# Patient Record
Sex: FEMALE | Race: WHITE | Employment: OTHER | URBAN - METROPOLITAN AREA
[De-identification: names, ages, dates, MRNs, and addresses within clinical notes are randomized per-mention and may not be internally consistent; named-entity substitution may affect disease eponyms.]

---

## 2018-01-21 ENCOUNTER — HOSPITAL ENCOUNTER (INPATIENT)
Facility: CLINIC | Age: 73
LOS: 5 days | Discharge: NURSING, REHAB OR LONG-TERM CARE | DRG: 190 | End: 2018-01-26
Attending: EMERGENCY MEDICINE | Admitting: INTERNAL MEDICINE
Payer: MEDICARE

## 2018-01-21 ENCOUNTER — APPOINTMENT (OUTPATIENT)
Dept: GENERAL RADIOLOGY | Facility: CLINIC | Age: 73
DRG: 190 | End: 2018-01-21
Attending: EMERGENCY MEDICINE
Payer: MEDICARE

## 2018-01-21 DIAGNOSIS — J44.1 COPD EXACERBATION (H): ICD-10-CM

## 2018-01-21 LAB
ANION GAP SERPL CALCULATED.3IONS-SCNC: 9 MMOL/L (ref 3–14)
BASE DEFICIT BLDV-SCNC: 0.1 MMOL/L
BUN SERPL-MCNC: 20 MG/DL (ref 7–30)
CALCIUM SERPL-MCNC: 8.1 MG/DL (ref 8.5–10.1)
CHLORIDE SERPL-SCNC: 98 MMOL/L (ref 94–109)
CO2 SERPL-SCNC: 26 MMOL/L (ref 20–32)
CREAT SERPL-MCNC: 0.48 MG/DL (ref 0.52–1.04)
ERYTHROCYTE [DISTWIDTH] IN BLOOD BY AUTOMATED COUNT: 13.2 % (ref 10–15)
FLUAV+FLUBV AG SPEC QL: NEGATIVE
FLUAV+FLUBV AG SPEC QL: NEGATIVE
GFR SERPL CREATININE-BSD FRML MDRD: >90 ML/MIN/1.7M2
GLUCOSE SERPL-MCNC: 134 MG/DL (ref 70–99)
HCO3 BLDV-SCNC: 26 MMOL/L (ref 21–28)
HCT VFR BLD AUTO: 42.5 % (ref 35–47)
HGB BLD-MCNC: 14.2 G/DL (ref 11.7–15.7)
INTERPRETATION ECG - MUSE: NORMAL
MCH RBC QN AUTO: 30 PG (ref 26.5–33)
MCHC RBC AUTO-ENTMCNC: 33.4 G/DL (ref 31.5–36.5)
MCV RBC AUTO: 90 FL (ref 78–100)
O2/TOTAL GAS SETTING VFR VENT: 2 %
OXYHGB MFR BLDV: 76 %
PCO2 BLDV: 47 MM HG (ref 40–50)
PH BLDV: 7.35 PH (ref 7.32–7.43)
PLATELET # BLD AUTO: 278 10E9/L (ref 150–450)
PO2 BLDV: 43 MM HG (ref 25–47)
POTASSIUM SERPL-SCNC: 3.5 MMOL/L (ref 3.4–5.3)
RBC # BLD AUTO: 4.73 10E12/L (ref 3.8–5.2)
SODIUM SERPL-SCNC: 133 MMOL/L (ref 133–144)
SPECIMEN SOURCE: NORMAL
WBC # BLD AUTO: 6.3 10E9/L (ref 4–11)

## 2018-01-21 PROCEDURE — 25000128 H RX IP 250 OP 636: Performed by: EMERGENCY MEDICINE

## 2018-01-21 PROCEDURE — 40000275 ZZH STATISTIC RCP TIME EA 10 MIN

## 2018-01-21 PROCEDURE — 94640 AIRWAY INHALATION TREATMENT: CPT

## 2018-01-21 PROCEDURE — A9270 NON-COVERED ITEM OR SERVICE: HCPCS | Mod: GY | Performed by: INTERNAL MEDICINE

## 2018-01-21 PROCEDURE — 99222 1ST HOSP IP/OBS MODERATE 55: CPT | Mod: AI | Performed by: INTERNAL MEDICINE

## 2018-01-21 PROCEDURE — 25000125 ZZHC RX 250

## 2018-01-21 PROCEDURE — 84145 PROCALCITONIN (PCT): CPT | Performed by: EMERGENCY MEDICINE

## 2018-01-21 PROCEDURE — 82805 BLOOD GASES W/O2 SATURATION: CPT | Performed by: EMERGENCY MEDICINE

## 2018-01-21 PROCEDURE — 87804 INFLUENZA ASSAY W/OPTIC: CPT | Performed by: EMERGENCY MEDICINE

## 2018-01-21 PROCEDURE — 80048 BASIC METABOLIC PNL TOTAL CA: CPT | Performed by: EMERGENCY MEDICINE

## 2018-01-21 PROCEDURE — 96374 THER/PROPH/DIAG INJ IV PUSH: CPT

## 2018-01-21 PROCEDURE — 25000125 ZZHC RX 250: Performed by: EMERGENCY MEDICINE

## 2018-01-21 PROCEDURE — 85027 COMPLETE CBC AUTOMATED: CPT | Performed by: EMERGENCY MEDICINE

## 2018-01-21 PROCEDURE — 12000000 ZZH R&B MED SURG/OB

## 2018-01-21 PROCEDURE — 25000132 ZZH RX MED GY IP 250 OP 250 PS 637: Mod: GY | Performed by: INTERNAL MEDICINE

## 2018-01-21 PROCEDURE — 99285 EMERGENCY DEPT VISIT HI MDM: CPT | Mod: 25

## 2018-01-21 PROCEDURE — 99207 ZZC CDG-MDM COMPONENT: MEETS LOW - DOWN CODED: CPT | Performed by: INTERNAL MEDICINE

## 2018-01-21 PROCEDURE — 93005 ELECTROCARDIOGRAM TRACING: CPT

## 2018-01-21 PROCEDURE — 71046 X-RAY EXAM CHEST 2 VIEWS: CPT

## 2018-01-21 RX ORDER — ONDANSETRON 2 MG/ML
4 INJECTION INTRAMUSCULAR; INTRAVENOUS EVERY 6 HOURS PRN
Status: DISCONTINUED | OUTPATIENT
Start: 2018-01-21 | End: 2018-01-26 | Stop reason: HOSPADM

## 2018-01-21 RX ORDER — AMOXICILLIN 250 MG
2 CAPSULE ORAL 2 TIMES DAILY PRN
Status: DISCONTINUED | OUTPATIENT
Start: 2018-01-21 | End: 2018-01-26 | Stop reason: HOSPADM

## 2018-01-21 RX ORDER — ALBUTEROL SULFATE 90 UG/1
2 AEROSOL, METERED RESPIRATORY (INHALATION) EVERY 6 HOURS PRN
COMMUNITY

## 2018-01-21 RX ORDER — IPRATROPIUM BROMIDE AND ALBUTEROL SULFATE 2.5; .5 MG/3ML; MG/3ML
6 SOLUTION RESPIRATORY (INHALATION) ONCE
Status: COMPLETED | OUTPATIENT
Start: 2018-01-21 | End: 2018-01-21

## 2018-01-21 RX ORDER — POLYETHYLENE GLYCOL 3350 17 G/17G
17 POWDER, FOR SOLUTION ORAL DAILY PRN
Status: DISCONTINUED | OUTPATIENT
Start: 2018-01-21 | End: 2018-01-26 | Stop reason: HOSPADM

## 2018-01-21 RX ORDER — BRIMONIDINE TARTRATE 1 MG/ML
1 SOLUTION/ DROPS OPHTHALMIC 2 TIMES DAILY
COMMUNITY

## 2018-01-21 RX ORDER — METHYLPREDNISOLONE SODIUM SUCCINATE 125 MG/2ML
80 INJECTION, POWDER, LYOPHILIZED, FOR SOLUTION INTRAMUSCULAR; INTRAVENOUS ONCE
Status: COMPLETED | OUTPATIENT
Start: 2018-01-21 | End: 2018-01-21

## 2018-01-21 RX ORDER — IPRATROPIUM BROMIDE AND ALBUTEROL SULFATE 2.5; .5 MG/3ML; MG/3ML
3 SOLUTION RESPIRATORY (INHALATION) ONCE
Status: COMPLETED | OUTPATIENT
Start: 2018-01-21 | End: 2018-01-21

## 2018-01-21 RX ORDER — OXYCODONE HYDROCHLORIDE 5 MG/1
5 TABLET ORAL EVERY 4 HOURS PRN
Status: DISCONTINUED | OUTPATIENT
Start: 2018-01-21 | End: 2018-01-26 | Stop reason: HOSPADM

## 2018-01-21 RX ORDER — ASPIRIN 81 MG/1
81 TABLET, CHEWABLE ORAL DAILY
Status: DISCONTINUED | OUTPATIENT
Start: 2018-01-22 | End: 2018-01-26 | Stop reason: HOSPADM

## 2018-01-21 RX ORDER — NITROGLYCERIN 0.4 MG/1
0.4 TABLET SUBLINGUAL EVERY 5 MIN PRN
Status: DISCONTINUED | OUTPATIENT
Start: 2018-01-21 | End: 2018-01-26 | Stop reason: HOSPADM

## 2018-01-21 RX ORDER — PROCHLORPERAZINE 25 MG
12.5 SUPPOSITORY, RECTAL RECTAL EVERY 12 HOURS PRN
Status: DISCONTINUED | OUTPATIENT
Start: 2018-01-21 | End: 2018-01-26 | Stop reason: HOSPADM

## 2018-01-21 RX ORDER — LISINOPRIL 20 MG/1
20 TABLET ORAL DAILY
Status: DISCONTINUED | OUTPATIENT
Start: 2018-01-22 | End: 2018-01-23

## 2018-01-21 RX ORDER — AMOXICILLIN 250 MG
1 CAPSULE ORAL 2 TIMES DAILY PRN
Status: DISCONTINUED | OUTPATIENT
Start: 2018-01-21 | End: 2018-01-26 | Stop reason: HOSPADM

## 2018-01-21 RX ORDER — ALBUTEROL SULFATE 0.83 MG/ML
2.5 SOLUTION RESPIRATORY (INHALATION)
Status: DISCONTINUED | OUTPATIENT
Start: 2018-01-21 | End: 2018-01-26 | Stop reason: HOSPADM

## 2018-01-21 RX ORDER — NALOXONE HYDROCHLORIDE 0.4 MG/ML
.1-.4 INJECTION, SOLUTION INTRAMUSCULAR; INTRAVENOUS; SUBCUTANEOUS
Status: DISCONTINUED | OUTPATIENT
Start: 2018-01-21 | End: 2018-01-26 | Stop reason: HOSPADM

## 2018-01-21 RX ORDER — IPRATROPIUM BROMIDE AND ALBUTEROL SULFATE 2.5; .5 MG/3ML; MG/3ML
3 SOLUTION RESPIRATORY (INHALATION)
Status: DISCONTINUED | OUTPATIENT
Start: 2018-01-22 | End: 2018-01-26 | Stop reason: HOSPADM

## 2018-01-21 RX ORDER — DOXYCYCLINE HYCLATE 100 MG
100 TABLET ORAL 2 TIMES DAILY
Status: DISCONTINUED | OUTPATIENT
Start: 2018-01-21 | End: 2018-01-26 | Stop reason: HOSPADM

## 2018-01-21 RX ORDER — ONDANSETRON 4 MG/1
4 TABLET, ORALLY DISINTEGRATING ORAL EVERY 6 HOURS PRN
Status: DISCONTINUED | OUTPATIENT
Start: 2018-01-21 | End: 2018-01-26 | Stop reason: HOSPADM

## 2018-01-21 RX ORDER — LIDOCAINE 40 MG/G
CREAM TOPICAL
Status: DISCONTINUED | OUTPATIENT
Start: 2018-01-21 | End: 2018-01-26 | Stop reason: HOSPADM

## 2018-01-21 RX ORDER — DORZOLAMIDE HYDROCHLORIDE AND TIMOLOL MALEATE 20; 5 MG/ML; MG/ML
1 SOLUTION/ DROPS OPHTHALMIC 2 TIMES DAILY
Status: DISCONTINUED | OUTPATIENT
Start: 2018-01-22 | End: 2018-01-26 | Stop reason: HOSPADM

## 2018-01-21 RX ORDER — ACETAMINOPHEN 325 MG/1
650 TABLET ORAL EVERY 4 HOURS PRN
Status: DISCONTINUED | OUTPATIENT
Start: 2018-01-21 | End: 2018-01-26 | Stop reason: HOSPADM

## 2018-01-21 RX ORDER — DORZOLAMIDE HYDROCHLORIDE AND TIMOLOL MALEATE 20; 5 MG/ML; MG/ML
1 SOLUTION/ DROPS OPHTHALMIC 2 TIMES DAILY
COMMUNITY

## 2018-01-21 RX ORDER — PROCHLORPERAZINE MALEATE 5 MG
5 TABLET ORAL EVERY 6 HOURS PRN
Status: DISCONTINUED | OUTPATIENT
Start: 2018-01-21 | End: 2018-01-26 | Stop reason: HOSPADM

## 2018-01-21 RX ORDER — DOXYCYCLINE 100 MG/1
100 CAPSULE ORAL 2 TIMES DAILY
Status: ON HOLD | COMMUNITY
Start: 2018-01-20 | End: 2018-01-26

## 2018-01-21 RX ORDER — BRIMONIDINE TARTRATE 1 MG/ML
1 SOLUTION/ DROPS OPHTHALMIC 2 TIMES DAILY
Status: DISCONTINUED | OUTPATIENT
Start: 2018-01-22 | End: 2018-01-26 | Stop reason: HOSPADM

## 2018-01-21 RX ORDER — ATORVASTATIN CALCIUM 10 MG/1
10 TABLET, FILM COATED ORAL DAILY
Status: DISCONTINUED | OUTPATIENT
Start: 2018-01-22 | End: 2018-01-26 | Stop reason: HOSPADM

## 2018-01-21 RX ORDER — BISACODYL 10 MG
10 SUPPOSITORY, RECTAL RECTAL DAILY PRN
Status: DISCONTINUED | OUTPATIENT
Start: 2018-01-21 | End: 2018-01-26 | Stop reason: HOSPADM

## 2018-01-21 RX ORDER — IPRATROPIUM BROMIDE AND ALBUTEROL SULFATE 2.5; .5 MG/3ML; MG/3ML
SOLUTION RESPIRATORY (INHALATION)
Status: COMPLETED
Start: 2018-01-21 | End: 2018-01-21

## 2018-01-21 RX ADMIN — IPRATROPIUM BROMIDE AND ALBUTEROL SULFATE 3 ML: .5; 3 SOLUTION RESPIRATORY (INHALATION) at 22:21

## 2018-01-21 RX ADMIN — IPRATROPIUM BROMIDE AND ALBUTEROL SULFATE 3 ML: 2.5; .5 SOLUTION RESPIRATORY (INHALATION) at 21:15

## 2018-01-21 RX ADMIN — IPRATROPIUM BROMIDE AND ALBUTEROL SULFATE 3 ML: .5; 3 SOLUTION RESPIRATORY (INHALATION) at 21:15

## 2018-01-21 RX ADMIN — DOXYCYCLINE HYCLATE 100 MG: 100 TABLET, FILM COATED ORAL at 23:55

## 2018-01-21 RX ADMIN — METHYLPREDNISOLONE SODIUM SUCCINATE 81.25 MG: 125 INJECTION, POWDER, FOR SOLUTION INTRAMUSCULAR; INTRAVENOUS at 21:46

## 2018-01-21 RX ADMIN — IPRATROPIUM BROMIDE AND ALBUTEROL SULFATE 3 ML: .5; 3 SOLUTION RESPIRATORY (INHALATION) at 21:48

## 2018-01-21 NOTE — IP AVS SNAPSHOT
[place] [address] [facility]    [address]    [facility] [address]    Phone:  [phone]    Fax:  [phone]                                       After Visit Summary   1/21/2018    Berenice Krause    MRN: [id number]           After Visit Summary Signature Page     I have received my discharge instructions, and my questions have been answered. I have discussed any challenges I see with this plan with the nurse or doctor.    ..........................................................................................................................................  Patient/Patient Representative Signature      ..........................................................................................................................................  Patient Representative Print Name and Relationship to Patient    ..................................................               ................................................  Date                                            Time    ..........................................................................................................................................  Reviewed by Signature/Title    ...................................................              ..............................................  Date                                                            Time

## 2018-01-21 NOTE — IP AVS SNAPSHOT
MRN:[id number]                      After Visit Summary   1/21/2018    Berenice Krause    MRN: [id number]           Thank you!     Thank you for choosing [facility] for your care. Our goal is always to provide you with excellent care. Hearing back from our patients is one way we can continue to improve our services. Please take a few minutes to complete the written survey that you may receive in the mail after you visit. If you would like to speak to someone directly about your visit please contact Patient Relations at [phone]. Thank you!          Patient Information     Date Of Birth          1945        Designated Caregiver       Most Recent Value    Caregiver    Will someone help with your care after discharge? no      About your hospital stay     You were admitted on:  January 21, 2018 You last received care in the:  [place] [address] [facility]    You were discharged on:  January 26, 2018       Who to Call     For medical emergencies, please call 911.  For non-urgent questions about your medical care, please call your primary care provider or clinic, [phone]          Attending Provider     Provider Specialty    Tad Menon MD Emergency Medicine    Lum, Sally Viramontes MD Emergency Medicine    Zak Crespo,  Internal Medicine       Primary Care Provider Office Phone # Fax #    Laureen Brizuela -[phone]      After Care Instructions     General info for SNF       Length of Stay Estimate: Short Term Care: Estimated # of Days <30  Condition at Discharge: Improving  Level of care:skilled   Rehabilitation Potential: Good  Admission H&P remains valid and up-to-date: Yes  Recent Chemotherapy: N/A  Use Nursing Home Standing Orders: Yes            Mantoux instructions       Give two-step Mantoux (PPD) Per Facility Policy Yes            Oxygen - Nasal cannula       1-2 Lpm by nasal cannula to keep O2 sats 92% or greater. Wean as able    "               Follow-up Appointments     Follow Up and recommended labs and tests       F/U with [facility] NP/MD in 7 days for recheck post hospitalization and to recheck BMP and BP after resumption of home lisinopril dosing of 20 mg per day  On discharge from [facility] she plans to f/u with Dr. Blank of [facility] to establish care-at that visit she should be evaluated for osteoporoses if not already completed and to get set up for formal pulmonary function studies to evaluate baseline lung function.                  Additional Services     Physical Therapy Adult Consult       Evaluate and treat as clinically indicated.    Reason:  Deconditioning                  Further instructions from your care team       NUTRITION DISCHARGE INSTRUCTIONS  - recommend high protein oral supplement if appetite/intakes remain poor, or if further weight loss is observed. Recommend multivitamin supplement.     Pending Results     No orders found from 1/19/2018 to 1/22/2018.            Statement of Approval     Ordered          01/26/18 1041  I have reviewed and agree with all the recommendations and orders detailed in this document.  EFFECTIVE NOW     Approved and electronically signed by:  Radha [place] MD             Admission Information     Date & Time Provider Department Dept. Phone    1/21/2018 Zak Crespo, DO [place] [address] [facility] [phone]      Your Vitals Were     Blood Pressure Pulse Temperature Respirations Height Weight    135/80 92 97.5  F (36.4  C) (Oral) 18 1.575 m (5' 2\") 36 kg (79 lb 6.4 oz)    Pulse Oximetry BMI (Body Mass Index)                94% 14.52 kg/m2          MyChart Information     [employer] lets you send messages to your doctor, view your test results, renew your prescriptions, schedule appointments and more. To sign up, go to [email or web address] . Click on \"Log in\" on the left side of the screen, which will take you to the Welcome page. Then click on \"Sign up Now\" on the right side " of the page.     You will be asked to enter the access code listed below, as well as some personal information. Please follow the directions to create your username and password.     Your access code is: [email or web address]  Expires: 2018 10:38 AM     Your access code will  in 90 days. If you need help or a new code, please call your [facility] clinic or [phone].        Care EveryWhere ID     This is your Care EveryWhere ID. This could be used by other organizations to access your [facility] medical records  [id number]        Equal Access to Services     [facility]: Hadii kiet Bardales, wadanielle aburto, darwin booth, suraj monteiro . So [facility] [phone].    ATENCIÓN: Si habla español, tiene a aldrich disposición servicios gratuitos de asistencia lingüística. Llame al [phone].    We comply with applicable federal civil rights laws and [place] laws. We do not discriminate on the basis of race, color, national origin, age, disability, sex, sexual orientation, or gender identity.               Review of your medicines      START taking        Dose / Directions    guaiFENesin 20 mg/mL Soln solution   Commonly known as:  ROBITUSSIN        Dose:  10 mL   Take 10 mLs by mouth every 4 hours as needed for cough   Quantity:  1200 mL   Refills:  0       ipratropium - albuterol 0.5 mg/2.5 mg/3 mL 0.5-2.5 (3) MG/3ML neb solution   Commonly known as:  DUONEB        Dose:  3 mL   Take 1 vial (3 mLs) by nebulization 4 times daily Take for 2 more days scheduled and then can use prn   Quantity:  360 mL   Refills:  0       predniSONE 20 MG tablet   Commonly known as:  DELTASONE        Dose:  20 mg   Start taking on:  2018   Take 1 tablet (20 mg) by mouth daily 20 mg po qd x 3 days then 10 mg po qd x 3 days then d/c   Refills:  0         CONTINUE these medicines which have NOT CHANGED        Dose / Directions    albuterol 108 (90 BASE) MCG/ACT Inhaler   Commonly known as:   PROAIR HFA/PROVENTIL HFA/VENTOLIN HFA        Dose:  2 puff   Inhale 2 puffs into the lungs every 6 hours as needed for shortness of breath / dyspnea or wheezing   Refills:  0       ASPIRIN ADULT LOW STRENGTH PO        Dose:  81 mg   Take 81 mg by mouth daily   Refills:  0       bimatoprost 0.01 % Soln   Commonly known as:  LUMIGAN        Dose:  1 drop   Place 1 drop into both eyes At Bedtime   Refills:  0       brimonidine 0.1 % ophthalmic solution   Commonly known as:  ALPHAGAN P        Dose:  1 drop   Place 1 drop into both eyes 2 times daily   Refills:  0       COSOPT 2-0.5 % ophthalmic solution   Generic drug:  dorzolamide-timolol        Dose:  1 drop   Place 1 drop into both eyes 2 times daily   Refills:  0       doxycycline 100 MG capsule   Commonly known as:  VIBRAMYCIN        Dose:  100 mg   Take 1 capsule (100 mg) by mouth 2 times daily for 4 days   Quantity:  8 capsule   Refills:  0       fluticasone-salmeterol 250-50 MCG/DOSE diskus inhaler   Commonly known as:  ADVAIR        Dose:  1 puff   Inhale 1 puff into the lungs every 12 hours   Refills:  0       LIPITOR PO        Dose:  10 mg   Take 10 mg by mouth daily   Refills:  0       LISINOPRIL PO        Dose:  20 mg   Take 20 mg by mouth daily   Refills:  0            Where to get your medicines      Some of these will need a paper prescription and others can be bought over the counter. Ask your nurse if you have questions.     You don't need a prescription for these medications     doxycycline 100 MG capsule    guaiFENesin 20 mg/mL Soln solution    ipratropium - albuterol 0.5 mg/2.5 mg/3 mL 0.5-2.5 (3) MG/3ML neb solution    predniSONE 20 MG tablet                Protect others around you: Learn how to safely use, store and throw away your medicines at www.disposemymeds.org.        ANTIBIOTIC INSTRUCTION     You've Been Prescribed an Antibiotic - Now What?  Your healthcare team thinks that you or your loved one might have an infection. Some infections  can be treated with antibiotics, which are powerful, life-saving drugs. Like all medications, antibiotics have side effects and should only be used when necessary. There are some important things you should know about your antibiotic treatment.      Your healthcare team may run tests before you start taking an antibiotic.    Your team may take samples (e.g., from your blood, urine or other areas) to run tests to look for bacteria. These test can be important to determine if you need an antibiotic at all and, if you do, which antibiotic will work best.      Within a few days, your healthcare team might change or even stop your antibiotic.    Your team may start you on an antibiotic while they are working to find out what is making you sick.    Your team might change your antibiotic because test results show that a different antibiotic would be better to treat your infection.    In some cases, once your team has more information, they learn that you do not need an antibiotic at all. They may find out that you don't have an infection, or that the antibiotic you're taking won't work against your infection. For example, an infection caused by a virus can't be treated with antibiotics. Staying on an antibiotic when you don't need it is more likely to be harmful than helpful.      You may experience side effects from your antibiotic.    Like all medications, antibiotics have side effects. Some of these can be serious.    Let you healthcare team know if you have any known allergies when you are admitted to the hospital.    One significant side effect of nearly all antibiotics is the risk of severe and sometimes deadly diarrhea caused by Clostridium difficile (C. Difficile). This occurs when a person takes antibiotics because some good germs are destroyed. Antibiotic use allows C. diificile to take over, putting patients at high risk for this serious infection.    As a patient or caregiver, it is important to understand your  or your loved one's antibiotic treatment. It is especially important for caregivers to speak up when patients can't speak for themselves. Here are some important questions to ask your healthcare team.    What infection is this antibiotic treating and how do you know I have that infection?    What side effects might occur from this antibiotic?    How long will I need to take this antibiotic?    Is it safe to take this antibiotic with other medications or supplements (e.g., vitamins) that I am taking?     Are there any special directions I need to know about taking this antibiotic? For example, should I take it with food?    How will I be monitored to know whether my infection is responding to the antibiotic?    What tests may help to make sure the right antibiotic is prescribed for me?      Information provided by:  www.cdc.gov/getsmart  U.S. Department of Health and Human Services  Centers for disease Control and Prevention  National Center for Emerging and Zoonotic Infectious Diseases  Division of Healthcare Quality Promotion             Medication List: This is a list of all your medications and when to take them. Check marks below indicate your daily home schedule. Keep this list as a reference.      Medications           Morning Afternoon Evening Bedtime As Needed    albuterol 108 (90 BASE) MCG/ACT Inhaler   Commonly known as:  PROAIR HFA/PROVENTIL HFA/VENTOLIN HFA   Inhale 2 puffs into the lungs every 6 hours as needed for shortness of breath / dyspnea or wheezing                                ASPIRIN ADULT LOW STRENGTH PO   Take 81 mg by mouth daily   Last time this was given:  81 mg on 1/26/2018  9:32 AM                                bimatoprost 0.01 % Soln   Commonly known as:  LUMIGAN   Place 1 drop into both eyes At Bedtime   Last time this was given:  1 drop on 1/25/2018  9:27 PM                                brimonidine 0.1 % ophthalmic solution   Commonly known as:  ALPHAGAN P   Place 1 drop into  both eyes 2 times daily   Last time this was given:  1 drop on 1/26/2018  9:35 AM                                COSOPT 2-0.5 % ophthalmic solution   Place 1 drop into both eyes 2 times daily   Last time this was given:  1 drop on 1/26/2018  9:37 AM   Generic drug:  dorzolamide-timolol                                doxycycline 100 MG capsule   Commonly known as:  VIBRAMYCIN   Take 1 capsule (100 mg) by mouth 2 times daily for 4 days                                fluticasone-salmeterol 250-50 MCG/DOSE diskus inhaler   Commonly known as:  ADVAIR   Inhale 1 puff into the lungs every 12 hours                                guaiFENesin 20 mg/mL Soln solution   Commonly known as:  ROBITUSSIN   Take 10 mLs by mouth every 4 hours as needed for cough                                ipratropium - albuterol 0.5 mg/2.5 mg/3 mL 0.5-2.5 (3) MG/3ML neb solution   Commonly known as:  DUONEB   Take 1 vial (3 mLs) by nebulization 4 times daily Take for 2 more days scheduled and then can use prn   Last time this was given:  3 mLs on 1/26/2018 11:07 AM                                LIPITOR PO   Take 10 mg by mouth daily   Last time this was given:  10 mg on 1/26/2018  9:32 AM                                LISINOPRIL PO   Take 20 mg by mouth daily   Last time this was given:  10 mg on 1/26/2018  9:31 AM                                predniSONE 20 MG tablet   Commonly known as:  DELTASONE   Take 1 tablet (20 mg) by mouth daily 20 mg po qd x 3 days then 10 mg po qd x 3 days then d/c   Start taking on:  1/27/2018   Last time this was given:  40 mg on 1/26/2018  9:32 AM

## 2018-01-21 NOTE — LETTER
Key Recommendations:  First COPD exacerbation.  Pt given COPD action care plan.      Esme Alfredo    AVS/Discharge Summary is the source of truth; this is a helpful guide for improved communication of patient story

## 2018-01-21 NOTE — LETTER
Transition Communication Hand-off for Care Transitions to Next Level of Care Provider    Name: Berenice Krause  MRN #: [id number]  Primary Care Provider: Laureen Brizuela  Primary Care MD Name: Dr Brizuela  Primary Clinic: [facility] [address]  [facility] [address]  Primary Care Clinic Name: [facility]  Reason for Hospitalization:  COPD exacerbation (H) [J44.1]  Admit Date/Time: 1/21/2018  9:05 PM  Discharge Date: 1/26  Payor Source: Payor: BCBS / Plan: BCBS PLATINUM BLUE / Product Type: PPO /     Readmission Assessment Measure (PETRA) Risk Score/category: average           Reason for Communication Hand-off Referral: Admission diagnoses: COPD    Discharge Plan: [facility]       Concern for non-adherence with plan of care:   Y/N n  Discharge Needs Assessment:  Needs       Most Recent Value    Anticipated Changes Related to Illness none    Equipment Currently Used at Home none    Transportation Available car, family or friend will provide    # of Referrals Placed by [facility]    PAS Number [id number]          Already enrolled in Tele-monitoring program and name of program:  refused  Follow-up specialty is recommended: Yes    Follow-up plan:  No future appointments.    Any outstanding tests or procedures:    Procedures     Future Labs/Procedures    Oxygen - Nasal cannula     Comments:    1-2 Lpm by nasal cannula to keep O2 sats 92% or greater. Wean as able          Referrals     Future Labs/Procedures    Physical Therapy Adult Consult     Comments:    Evaluate and treat as clinically indicated.    Reason:  Deconditioning            Key Recommendations:  First COPD exacerbation.  Pt given COPD action care plan.  Pt discharge to TCU with oxygen needs. Could benefit from close following/follow up after discharge from TCU on COPD symptom management and possible new home oxygen needs.    Adelita Taylor    AVS/Discharge Summary is the source of truth; this is a helpful guide for improved  communication of patient story

## 2018-01-21 NOTE — IP AVS SNAPSHOT
` `     [place] [address] MEDICAL SURGICAL: [phone]                 INTERAGENCY TRANSFER FORM - NOTES (H&P, Discharge Summary, Consults, Procedures, Therapies)   2018                    Hospital Admission Date: 2018  BERENICE KRAUSE   : 1945  Sex: Female        Patient PCP Information     Provider PCP Type    Laureen Brizuela MD General         History & Physicals      H&P by Zak Crespo DO at 2018 11:08 PM     Author:  Zak Crespo DO Service:  Hospitalist Author Type:  Physician    Filed:  2018 11:08 PM Date of Service:  2018 11:08 PM Creation Time:  2018 11:03 PM    Status:  Signed :  Zak Crespo DO (Physician)         [facility]  Hospitalist Admission Note    Name: Berenice Krause      MRN: [id number]  YOB: 1945    Age: 72 year old  Date of admission: 2018  Primary care provider: Laureen Brizuela            Assessment and Plan:   Berenice Krause is a 72 year old female with a history of COPD who presents with COPD exacerbation    1. COPD exacerbation.  Prednisone 60 mg/day.  Duonebs QID.  Albuterol as needed.    2.  Acute hypoxic respiratory failure.  Supplemental oxygen as needed.    3.  Hyperlipidemia.  Lipitor.     4.  Hypertension.  Lisinopril.    Code status: Full code.  Admit to inpatient.  Prophylaxis: Lovenox.              Chief Complaint:   Shortness of breath.  Shortness of breath and cough.         History of Present Illness:   Berenice Krause a 72-year-old female who presents with shortness of breath.  She has been feeling short of breath for the past few weeks.  Has been significantly worse over the past 5 days.  It is becoming worse over the past 5 days has been progressively worsening during this time.  She has developed a nonproductive cough.  Feels weak with this.  Has never had symptoms this severe previously.  Has not been around anyone else sick that she knows of.  She has not had fevers or chills.   Has not had any chest pain.  Nothing at home was helping the symptoms.  Nebulizers in the emergency room have helped symptoms.  No other complaints.              Past Medical History:   COPD.  Hyperlipidemia.  Atrial septal defect repaired as a child.  Hypertension.       Past Surgical History:   History reviewed. No pertinent surgical history.          Social History:     Social History   Substance Use Topics     Smoking status: Former Smoker     Smokeless tobacco: Former User     Alcohol use No             Family History:   Mother with lung cancer.         Allergies:   No Known Allergies          Medications:     Prior to Admission medications    Medication Sig Last Dose Taking? Auth Provider   albuterol (PROAIR HFA/PROVENTIL HFA/VENTOLIN HFA) 108 (90 BASE) MCG/ACT Inhaler Inhale 2 puffs into the lungs every 6 hours as needed for shortness of breath / dyspnea or wheezing 1/21/2018 Yes Reported, Patient   ASPIRIN ADULT LOW STRENGTH PO Take 81 mg by mouth daily 1/20/2018 Yes Reported, Patient   brimonidine (ALPHAGAN P) 0.1 % ophthalmic solution Place 1 drop into both eyes 2 times daily 1/21/2018 at PM Yes Reported, Patient   Atorvastatin Calcium (LIPITOR PO) Take 10 mg by mouth daily 1/20/2018 at PM Yes Reported, Patient   bimatoprost (LUMIGAN) 0.01 % SOLN Place 1 drop into both eyes At Bedtime 1/20/2018 at PM Yes Reported, Patient   dorzolamide-timolol (COSOPT) 2-0.5 % ophthalmic solution Place 1 drop into both eyes 2 times daily 1/21/2018 at PM Yes Reported, Patient   doxycycline (VIBRAMYCIN) 100 MG capsule Take 100 mg by mouth 2 times daily 1/21/2018 at AM Yes Reported, Patient   LISINOPRIL PO Take 20 mg by mouth daily 1/21/2018 at AM Yes Reported, Patient   fluticasone-salmeterol (ADVAIR) 250-50 MCG/DOSE diskus inhaler Inhale 1 puff into the lungs every 12 hours Not Started Yes Reported, Patient             Review of Systems:   A Comprehensive greater than 10 system review of systems was carried out.  Pertinent  positives and negatives are noted above.  Otherwise negative for contributory information.           Physical Exam:   Blood pressure 135/81, pulse 92, temperature 99.3  F (37.4  C), temperature source Oral, resp. rate 28, weight 37.2 kg (82 lb), SpO2 96 %.  Wt Readings from Last 1 Encounters:   01/21/18 37.2 kg (82 lb)     Exam:  GENERAL: No apparent distress. Awake, alert, and fully oriented.  HEENT: Normocephalic, atraumatic. Extraocular movements intact.  CARDIOVASCULAR: Regular rate and rhythm without murmurs or rubs. No S3.  PULMONARY: Wheeze to auscultation bilaterally.  ABDOMINAL: Soft, non-tender, non-distended. Bowel sounds normoactive.   EXTREMITIES: No cyanosis or clubbing. No appreciable edema.  NEUROLOGICAL: CN 2-12 grossly intact, no focal neurological deficits.  DERMATOLOGICAL: No rash, ulcer, bruising, nor jaundice.          Data:       Laboratory:[KR1.1]    Recent Labs  Lab 01/21/18  2126   WBC 6.3   HGB 14.2   HCT 42.5   MCV 90          Recent Labs  Lab 01/21/18  2126      POTASSIUM 3.5   CHLORIDE 98   CO2 26   ANIONGAP 9   *   BUN 20   CR 0.48*   GFRESTIMATED >90   GFRESTBLACK >90   RICHARD 8.1*[KR1.2]     No results for input(s): CULT in the last 168 hours.    Imaging:  Recent Results (from the past 24 hour(s))   Chest XR,  PA & LAT    Narrative    CHEST TWO VIEWS  1/21/2018 9:40 PM     HISTORY: Dyspnea.    COMPARISON: 12/17/2011.      Impression    IMPRESSION: Hyperinflated lungs. Masslike density laterally in the  left upper lobe 2.2 x 1.3 cm diameter, unchanged. Normal heart size  and pulmonary vascularity. Sternal wires. No pleural effusion.  Interval development of compression fractures of two mid to lower  thoracic vertebral bodies. Age is indeterminate.     KEERTHI MITTAL MD[KR1.1]            Revision History        User Key Date/Time User Provider Type Action    > KR1.2 1/21/2018 11:08 PM Zak Crespo, DO Physician Sign     KR1.1 1/21/2018 11:03 PM Zak Crespo  "D, DO Physician                   Discharge Summaries     No notes of this type exist for this encounter.         Consult Notes      Consults by Shauna Salmeron RD, LD at 1/24/2018  7:55 AM     Author:  Sahuna Salmeron RD, LD Service:  Nutrition Author Type:  Registered Dietitian    Filed:  1/24/2018 10:17 AM Date of Service:  1/24/2018  7:55 AM Creation Time:  1/24/2018  7:29 AM    Status:  Signed :  Shauna Salmeron RD, LD (Registered Dietitian)     Consult Orders:    1. Nutrition Services Adult IP Consult [[id number]] ordered by Zak Crespo DO at 01/23/18 1434                CLINICAL NUTRITION SERVICES  -  ASSESSMENT NOTE    Malnutrition:[AK1.1] Severe[AK1.2]     REASON FOR ASSESSMENT  Berenice Krause is a 72 year old female seen by Registered Dietitian for Provider Order - Nutrition Education - \"malnutrition\"    NUTRITION HISTORY[AK1.1]  - Information obtained from Jan.[AK1.2]  - Admitted for COPD exacerbation[AK1.1], pt struggles with SOB at baseline.   - gave minimal information about usual intakes, other than that she tries to do smaller/frequent meals because she cannot eat much at one time.   - Breakfast is usually cereal, and she eats a muffin for a snack mid-morning. When asked about lunch/dinner, pt stated that she hasn't been \"doing a good job lately\" of eating adequate meals.   - [place] downs got back from a cruise, where she made it to all meals however reports she did not eat much. She was feeling unwell for the 5 days she was on vacation.   - describes at least a 2-3 week period prior to her vacation when she was not eating as well as normal, however suspect that pt eats small meals/snacks at baseline. <50-75% intakes for at least the past 3-4 weeks.   - NKFA, although pt does dislike many foods (especially protein foods). Does not eat yogurt, cottage cheese, much meat.   - Likes eggs (Can scramble eggs easily, makes garcia).   - Likes it best when she has someone bring her takeout from a " "restaurant, because it is easy for her to re-heat and use as leftovers.   - Lives next-door to ?brother and sister-in-law who help her with some grocery shopping. She does not like the foods they eat however so she does not eat meals with them.   - Pt mentioned multiple times that she is planning on hiring help for when she goes home (specifically mentioned grocery shopping).[AK1.2]      CURRENT NUTRITION ORDERS  Diet Order:     Regular     Current Intake/Tolerance:  50-75% intakes recorded[AK1.1], small meals TID. Pt asked if she was able to order small snacks between meals - encouraged.[AK1.2]       PHYSICAL FINDINGS  Observed[AK1.1]  Muscle Wasting - moderate-severe at baseline, with some reported recent loss  Subcutaneous fat loss - mod-severe at baseline[AK1.2]  Obtained from Chart/Interdisciplinary Team[AK1.1]  None noted[AK1.2]    ANTHROPOMETRICS  Height: 5' 2\"  Weight: 79 lbs 6.4 oz (36 kg)  Body mass index is 14.52 kg/(m^2).  Weight Status:  Underweight BMI <18.5  IBW: 50 kg  % IBW: 72%  Weight History: Weight review indicates a ~9# loss in <1 year (10.8%), 4# loss in the past 5 months (5%).[AK1.1] Pt reports UBW of 90#.[AK1.2]   Wt Readings from Last 10 Encounters:   01/21/18 36 kg (79 lb 6.4 oz)   Per \"care everywhere\"   8/21/2017 - 37.8 kg (83 lb 6 oz)  2/21/2017 - 39.9 kg (88 lb)   1/29/2016 - 44.7 kg (98 lb 8 oz)    LABS  Labs reviewed  Steroid-induced hyperglycemia, A1c 5.8%    MEDICATIONS  Medications reviewed    Dosing Weight 36 kg    ASSESSED NUTRITION NEEDS PER APPROVED PRACTICE GUIDELINES:  Estimated Energy Needs: 2486-0330 kcals (30-35 Kcal/Kg)  Justification: repletion and underweight  Estimated Protein Needs: 43-54 grams protein (1.2-1.5 g pro/Kg)  Justification: preservation of lean body mass  Estimated Fluid Needs: 2082-6447 mL (1 mL/Kcal)  Justification: maintenance    MALNUTRITION:  % Weight Loss:  Weight loss does not meet criteria for malnutrition - although pt chronically underweight  % " Intake:[AK1.1]  </= 75% for >/= 1 month (severe malnutrition)[AK1.2]  Subcutaneous Fat Loss:[AK1.1]  Orbital region moderate depletion and Upper arm region severe depletion - minimal stores at baseline.[AK1.2]   Muscle Loss:[AK1.1]  Temporal region mild depletion, Clavicle bone region severe depletion, Acromion bone region severe depletion, Scapular bone region severe depletion and Dorsal hand region severe depletion - minimal at baseline[AK1.2]  Fluid Retention:[AK1.1]  None noted[AK1.2]    Malnutrition Diagnosis:[AK1.1] Severe malnutrition[AK1.2]  In Context of:[AK1.1]  Acute illness or injury  Chronic illness or disease, ?component of Social/Environmental factor[AK1.2]    NUTRITION DIAGNOSIS:[AK1.1]  Malnutrition[AK1.2] related to[AK1.1] chronically inadequate oral intakes w/ some component of SOB w/ eating r/t COPD[AK1.2] as evidenced by[AK1.1] recent decline intakes to at 75% or less of baseline, weight loss of up to 5% in the past 5 weeks, e/o minimal muscle and fat stores at baseline, coding for severe malnutrition, Chronically underweight BMI 14.5 kg/m^2.[AK1.2]       NUTRITION INTERVENTIONS  Recommendations / Nutrition Prescription[AK1.1]  Continue regular diet, 6 small meals - encourage pt to order snacks between meals[AK1.2]      Implementation  Nutrition education:[AK1.1] Provided education on small/frequent meals, protein foods, need for kcals/protein to maintain mass, increased caloric expenditure with COPD.   Collaboration and Referral of Nutrition care: Pt discussed during interdisciplinary rounds.[AK1.2]       Nutrition Goals[AK1.1]  Pt to tolerate at least 75% of meals TID + 1-2 snacks daily.[AK1.2]       MONITORING AND EVALUATION:[AK1.1]  Progress towards goals will be monitored and evaluated per protocol and Practice Guidelines      Shauna Salmeron RD, LD  3rd floor/ICU: [phone]  All other floors: [phone]  Weekend/holiday: [phone]  Office: [phone][AK1.2]                 Revision  History        User Key Date/Time User Provider Type Action    > AK1.2 1/24/2018 10:17 AM Shauna Salmeron RD, [place] Registered Dietitian Sign     AK1.1 1/24/2018  7:29 AM Shauna Salmeron RD, [place] Registered Dietitian             Consults by Esme Alfredo RN at 1/22/2018  2:02 PM     Author:  Esme Alfredo RN Service:  (none) Author Type:      Filed:  1/22/2018  2:03 PM Date of Service:  1/22/2018  2:02 PM Creation Time:  1/22/2018  1:59 PM    Status:  Signed :  Esme Alfredo RN ()     Consult Orders:    1. Care Coordinator IP Consult [[id number]] ordered by Zak Crespo DO at 01/22/18 1359                Care Transition Initial Assessment - RN    Reason For Consult: care coordination/care conference, discharge planning   Met with: Patient.    DATA   Active Problems:    COPD exacerbation (H)       Cognitive Status: awake, alert and oriented.  [email or web address]  Primary Care MD Name: Dr Brizuela  Contact information and PCP information verified: Yes    Lives With: alone       Insurance concerns: No Insurance issues identified    ASSESSMENT  Patient currently receives the following services:  none        Identified issues/concerns regarding health management: Pt is admitted with COPD exacerbation.  This is the first time she has had this issue.  I discussed COPD action care plan with pt.  She declines [employer] assurance for COPD.  She is agreeable to f/u with [facility].  She is debating about changing providers.  She declines need for resources.  Hand off will be given to Dr Brizuela's RN CTS at time of dc.       PLAN  Patient given options and choices for discharge yes .  Patient/family is agreeable to the plan?  Yes:   Patient anticipates discharging to home .        Patient anticipates needs for home equipment: No  Discharge Planner   Discharge Plans in progress: yes  Barriers to discharge plan: none  Plan/Disposition: Home     Care   (CTS) will continue to follow as needed.    Nissa PRITCHETT CTS 8002[RB1.1]         Revision History        User Key Date/Time User Provider Type Action    > RB1.1 1/22/2018  2:03 PM Esme Alfredo RN Case Manager Sign                     Progress Notes - Physician (Notes from 01/23/18 through 01/26/18)      Progress Notes by Blas Romero at 1/26/2018 10:04 AM     Author:  Blas Romero Service:  (none) Author Type:  Coordinator    Filed:  1/26/2018 10:04 AM Date of Service:  1/26/2018 10:04 AM Creation Time:  1/26/2018 10:04 AM    Status:  Signed :  Blas oRmero (Coordinator)         Your information has been submitted on January 26th, 2018 at 10:04:34 AM CST. The confirmation number is [id number][BB1.1]       Revision History        User Key Date/Time User Provider Type Action    > BB1.1 1/26/2018 10:04 AM Blas Romero Coordinator Sign            Progress Notes by Celina Newman at 1/26/2018  9:43 AM     Author:  Celina Newman Service:  Social Work Author Type:      Filed:  1/26/2018  9:48 AM Date of Service:  1/26/2018  9:43 AM Creation Time:  1/26/2018  9:43 AM    Status:  Signed :  Celina Newman ()         [facility]  Asked to speak with pt re: d/c plan.  Did so.  Pt very anxious about going to [facility]  She had heard it was an apt building with no services.  Explained that it was a TCU with nursing, aides, PT, meals, NP, etc.  This seemed to settle her mind a bit.  She wants to go.  She requests transport and SWS did explain that this is a private pay service and she would get a bill for this.  She accepts this.  HE set-up for 1400.  SW offered to contact family but pt stated she would call them herself.  Notified nursing, chg nurse and [place] [facility] that pt will d/c at 1400.  P:  No further SW needs[AJ1.1]     Revision History        User Key Date/Time User Provider Type Action    > AJ1.1 1/26/2018  9:48 AM Celina Newman  Sign            Progress  "Notes by Yamila Barrett RT at 1/26/2018 12:42 AM     Author:  Yamila Barrett RT Service:  (none) Author Type:  Respiratory Therapist    Filed:  1/26/2018 12:46 AM Date of Service:  1/26/2018 12:42 AM Creation Time:  1/26/2018 12:42 AM    Status:  Signed :  Yamila Barrett RT (Respiratory Therapist)         [facility]     Date: 1/26/18  Admission Dx: COPD exacerbation  Pulmonary History: COPD  Home Nebulizer/MDI Use: Albuterol PRN  Home Oxygen: none  Acuity Level (RCAT flow sheet): Level 3  Aerosol Therapy initiated: Duoneb QID, Albuterol prn      Pulmonary Hygiene initiated: Cough and deep breathing      Volume Expansion initiated: Incentive spirometry      Current Oxygen Requirements: 1LPM  Current SpO2: 92%    Re-evaluation date: 1/29/18    Patient Education: Indications for nebulizers      See \"RT Assessments\" flow sheet for patient assessment scoring and Acuity Level Details.[GW1.1]                Revision History        User Key Date/Time User Provider Type Action    > GW1.1 1/26/2018 12:46 AM Yamila Barrett RT Respiratory Therapist Sign            Progress Notes by White, Corinne C, LSW at 1/25/2018  1:56 PM     Author:  White, Corinne C, LSW Service:  (none) Author Type:      Filed:  1/25/2018  4:07 PM Date of Service:  1/25/2018  1:56 PM Creation Time:  1/25/2018  1:56 PM    Status:  Addendum :  White, Corinne C, LSW ()         CM: Met with pt and her brother. PT now feels she will need TCU for dc planning support rather than returning home alone. Pt has a lot of anxiety about her mobility and her new o2 needs. PT does lives alone and has a lot of stairs to navigate/ SWS spoke with pt and brother about resources CC provided at their visit this week. Pt will keep this info and consider the option of a life line  I/A:L PT will only consider a private room in TCU and aware of possible private cost  P: [facility] [address].[CW1.1] "     CM: PT has been accepted for Private room at [facility]. [facility] accepted but only shared. PT does NOT want shared[CW1.2]       Revision History        User Key Date/Time User Provider Type Action    > CW1.2 1/25/2018  4:07 PM White, Corinne C, LSW  Addend     CW1.1 1/25/2018  1:58 PM White, Corinne C, LSW  Sign            Progress Notes by Park Currie OT at 1/25/2018 12:32 PM     Author:  Park Currie OT Service:  Acute IP Rehab Author Type:  Occupational Therapist    Filed:  1/25/2018 12:32 PM Date of Service:  1/25/2018 12:32 PM Creation Time:  1/25/2018 12:32 PM    Status:  Signed :  Park Currie OT (Occupational Therapist)            01/25/18 1116   Quick Adds   Type of Visit Initial Occupational Therapy Evaluation   Living Environment   Lives With alone   Living Arrangements house   Number of Stairs to Enter Home 1   Number of Stairs Within Home 7   Transportation Available car;family or friend will provide   Living Environment Comment pt lives alone in multi story home, has supportive brother adn sister-in-law next door   Self-Care   Usual Activity Tolerance moderate   Current Activity Tolerance poor   Regular Exercise no   Equipment Currently Used at Home none   Activity/Exercise/Self-Care Comment pt independent at baseline, no O2 needs, no AD/DME; manages her home with frequent check-ins from brother and sister-in-law who live next door   Functional Level Prior   Ambulation 0-->independent   Transferring 0-->independent   Toileting 0-->independent  (standard height, vanity nearby)   Bathing 0-->independent  (walk-in shower)   Dressing 0-->independent   Fall history within last six months no   Prior Functional Level Comment pt reports independence at baseline; has 3 bathrooms in 4 story home, all standard toilets, has walk-in shower and tub showers   General Information   Onset of Illness/Injury or Date of Surgery - Date 01/24/18   Referring  Physician Zak GIRON   Patient/Family Goals Statement eventually return home   Additional Occupational Profile Info/Pertinent History of Current Problem Berenice Krause is a 72 year old female admitted on 1/21/2018 with COPD exacerbation.and acute hypoxic respiratory failure requiring O2; pt noticed onset while on a cruise, ended up staying in her room a lot and returned on 1/13, required wheelchair off cruise and in airport; Pt and brother report noticing cognitive changes related to memory for a short time now, unclear on timeline   Precautions/Limitations oxygen therapy device and L/min  (1 Lpm via NC during eval, baseline roomair)   Cognitive Status Examination   Orientation orientation to person, place and time   Level of Consciousness alert   Able to Follow Commands mild impairment   Memory impaired  (STM (0/5))   Cognitive Comment pt with cognitive deficits, see daily doc for further details   Visual Perception   Visual Perception Wears glasses   Sensory Examination   Sensory Quick Adds No deficits were identified   Pain Assessment   Patient Currently in Pain No   Range of Motion (ROM)   ROM Quick Adds No deficits were identified   Strength   Manual Muscle Testing Quick Adds No deficits were identified   Mobility   Bed Mobility Comments not assessed, pt declined out of bed activity   Transfer Skills   Transfer Comments not assessed, pt declined out of bed activity; per PT pt moving with FWW   Instrumental Activities of Daily Living (IADL)   Previous Responsibilities meal prep;housekeeping;laundry;shopping;medication management;finances;driving   IADL Comments pt has hired yardwork & snow removal   Activities of Daily Living Analysis   Impairments Contributing to Impaired Activities of Daily Living balance impaired;cognition impaired;strength decreased   General Therapy Interventions   Planned Therapy Interventions ADL retraining;IADL retraining   Clinical Impression   Criteria for Skilled Therapeutic  "Interventions Met yes, treatment indicated   OT Diagnosis impaired ability to manage ADL/IADLs safely and independently   Influenced by the following impairments impaired cognition, weakness, fatigue, impaired balance   Assessment of Occupational Performance 3-5 Performance Deficits   Identified Performance Deficits impaired ability to manage dressing, bathing, toilet, standing self cares and home management tasks   Clinical Decision Making (Complexity) Low complexity   Therapy Frequency daily   Predicted Duration of Therapy Intervention (days/wks) 3 days   Anticipated Discharge Disposition Home with Home Therapy;Transitional Care Facility   Risks and Benefits of Treatment have been explained. Yes   Patient, Family & other staff in agreement with plan of care Yes   Clinical Impression Comments pt demonstrates ability to benefit from skilled OT to improve overall safety and independence   [facility] TM \"6 Clicks\"   2016, Trustees of [facility], under license to [employer].  All rights reserved.   6 Clicks Short Forms Daily Activity Inpatient Short Form   [facility] AM-PAC  \"6 Clicks\" Daily Activity Inpatient Short Form   1. Putting on and taking off regular lower body clothing? 3 - A Little   2. Bathing (including washing, rinsing, drying)? 2 - A Lot   3. Toileting, which includes using toilet, bedpan or urinal? 3 - A Little   4. Putting on and taking off regular upper body clothing? 4 - None   5. Taking care of personal grooming such as brushing teeth? 3 - A Little   6. Eating meals? 4 - None   Daily Activity Raw Score (Score out of 24.Lower scores equate to lower levels of function) 19   Total Evaluation Time   Total Evaluation Time (Minutes) 12[SC1.1]        Revision History        User Key Date/Time User Provider Type Action    > SC1.1 1/25/2018 12:32 PM Park Currie OT Occupational Therapist Sign            Progress Notes by Zak Crespo DO at 1/25/2018 11:57 AM     " "Author:  Zak Crespo DO Service:  Hospitalist Author Type:  Physician    Filed:  1/25/2018 12:00 PM Date of Service:  1/25/2018 11:57 AM Creation Time:  1/25/2018 11:57 AM    Status:  Signed :  Zak Crespo DO (Physician)         [facility]  Hospitalist Progress Note  Zak Crespo DO 01/25/2018    Reason for Stay (Diagnosis): COPD exacerbation         Assessment and Plan:      Summary of Stay: Berenice Krause is a 72 year old female admitted on 1/21/2018 with COPD exacerbation.  Problem List:   1. COPD exacerbation.  Continue Breo elliptical, duo nebs, prednisone, and doxycycline.  Albuterol as needed.  2. Acute hypoxic respiratory failure.  Supplemental oxygen as needed.  3. Hypertension.  Continue lisinopril at 10 mg/day.  4. Hyperlipidemia.  Continue Lipitor.  5. Hyperglycemia.  Steroid-induced.  Hemoglobin A1c 5.8.  6. Occasional mild tachycardia.  TSH normal.  7. Deconditioning.  PT and OT consults.  8. Malnutrition.  RD consult.  DVT Prophylaxis: Ambulate every shift  Code Status: Full Code  Discharge Dispo: TCU vs. Home with home care.  Estimated Disch Date / # of Days until Disch: 1-2           Interval History (Subjective):      Short of breath.  Occasional cough.  Denies chest pain, fevers, chills, nausea, vomiting, or diarrhea.                  Physical Exam:      Last Vital Signs:  /72 (BP Location: Right arm)  Pulse 92  Temp 98.1  F (36.7  C) (Oral)  Resp 20  Ht 1.575 m (5' 2\")  Wt 36 kg (79 lb 6.4 oz)  SpO2 90%  BMI 14.52 kg/m2    Gen:  NAD, A&Ox3.  Eyes:  PERRL, sclera anicteric.  OP:  MMM, no lesions.  Neck:  Supple.  CV:  Regular, no murmurs.  Lung: Diminished breath sounds b/l, occasional wheeze bilaterally, normal effort.  Ab:  +BS, soft.  Skin:  Warm, dry to touch.  No rash.  Ext:  No pitting edema LE b/l.           Medications:      All current medications were reviewed with changes reflected in problem list.         Data:      All new lab " and imaging data was reviewed.   Labs:[KR1.1]    Recent Labs  Lab 01/25/18  0902      POTASSIUM 3.4   CHLORIDE 100   CO2 32   ANIONGAP 6   *   BUN 19   CR 0.54   GFRESTIMATED >90   GFRESTBLACK >90   RICHARD 8.4*       Recent Labs  Lab 01/22/18  0901   WBC 3.3*   HGB 14.6   HCT 43.9   MCV 91   [KR1.2]      Imaging:[KR1.1]   No results found for this or any previous visit (from the past 24 hour(s)).[KR1.2]       Revision History        User Key Date/Time User Provider Type Action    > KR1.2 1/25/2018 12:00 PM Zak Crespo, DO Physician Sign     KR1.1 1/25/2018 11:57 AM Zak Crespo, DO Physician             Progress Notes by Maribell Neumann PT at 1/25/2018  9:33 AM     Author:  Maribell Neumann PT Service:  Acute IP Rehab Author Type:  Physical Therapist    Filed:  1/25/2018  9:33 AM Date of Service:  1/25/2018  9:33 AM Creation Time:  1/25/2018  9:33 AM    Status:  Signed :  Maribell Neumann PT (Physical Therapist)          01/25/18 0855   Quick Adds   Type of Visit Initial PT Evaluation   Living Environment   Lives With alone   Living Arrangements house  (multi level home)   Home Accessibility stairs to enter home;stairs within home   Number of Stairs to Enter Home 1   Number of Stairs Within Home 7  (multi level split; no bathroom on main level)   Transportation Available car;family or friend will provide  (patient drives)   Living Environment Comment multi level split home   Self-Care   Usual Activity Tolerance moderate   Current Activity Tolerance poor   Regular Exercise no   Equipment Currently Used at Home none   Activity/Exercise/Self-Care Comment patient normally independent with mobility without an assistive device and no O2   Functional Level Prior   Ambulation 0-->independent   Transferring 0-->independent   Toileting 0-->independent   Bathing 0-->independent   Dressing 0-->independent   Eating 0-->independent   Communication 0-->understands/communicates without difficulty    Swallowing 0-->swallows foods/liquids without difficulty   Cognition 1 - attention or memory deficits  (lately- 1 week)   Fall history within last six months no   Which of the above functional risks had a recent onset or change? ambulation  (activity tolerance)   Prior Functional Level Comment patient normally independent with mobility; limited by SOB   General Information   Onset of Illness/Injury or Date of Surgery - Date 01/21/18   Referring Physician Zak Crespo, DO   Patient/Family Goals Statement fearful of return home   Pertinent History of Current Problem (include personal factors and/or comorbidities that impact the POC) Berenice Krause is a 72 year old female admitted on 1/21/2018 with COPD exacerbation.and acute hypoxic respiratory failure requiring O2; see medical record for further information   Precautions/Limitations oxygen therapy device and L/min  (1L)   General Observations patient noted to be anxious and forgetful during session   Cognitive Status Examination   Orientation orientation to person, place and time   Level of Consciousness alert   Follows Commands and Answers Questions 75% of the time;100% of the time   Personal Safety and Judgment at risk behaviors demonstrated;impaired   Memory impaired   Cognitive Comment noted to be forgetful during conversation   Pain Assessment   Patient Currently in Pain No   Posture    Posture Kyphosis   Range of Motion (ROM)   ROM Comment WFL   Strength   Strength Comments significant functional weakness noted during mobility; buckling of LE's L > R   Bed Mobility   Bed Mobility Comments independent with HOB elevated   Transfer Skills   Transfer Comments sit<>stand with CGA/SBA initially; with fatigue and SOB patient needing CGA for safety secondary to unsteadiness   Gait   Gait Comments only able to tolerate gait distance of 60 feet with no device; on 1L O2; mod SOB and buckling of LE's; unable to tolerate stair trial today   Balance   Balance  "Comments impaired gait stability; worse with fatigue   General Therapy Interventions   Planned Therapy Interventions gait training;transfer training;strengthening;progressive activity/exercise   Clinical Impression   Criteria for Skilled Therapeutic Intervention yes, treatment indicated   PT Diagnosis impaired gait/transfers; weakness   Influenced by the following impairments weakness; decreased activity tolerance; SOB with minimal activity; impaired balance; impaired cognition   Functional limitations due to impairments impaired independence with functional mobility   Clinical Presentation Evolving/Changing   Clinical Presentation Rationale impaired cognition; lives alone; assist for mobility; lives in a multi level house; unable to access currently   Clinical Decision Making (Complexity) Moderate complexity   Therapy Frequency` daily   Predicted Duration of Therapy Intervention (days/wks) 4 days   Anticipated Equipment Needs at Discharge front wheeled walker   Anticipated Discharge Disposition Transitional Care Facility   Risk & Benefits of therapy have been explained Yes   Patient, Family & other staff in agreement with plan of care Yes   [facility] TM \"6 Clicks\"   2016, Trustees of [facility], under license to [employer].  All rights reserved.   6 Clicks Short Forms Basic Mobility Inpatient Short Form   [facility]  \"6 Clicks\" V.2 Basic Mobility Inpatient Short Form   1. Turning from your back to your side while in a flat bed without using bedrails? 4 - None   2. Moving from lying on your back to sitting on the side of a flat bed without using bedrails? 4 - None   3. Moving to and from a bed to a chair (including a wheelchair)? 3 - A Little   4. Standing up from a chair using your arms (e.g., wheelchair, or bedside chair)? 3 - A Little   5. To walk in hospital room? 3 - A Little   6. Climbing 3-5 steps with a railing? 2 - A Lot   Basic Mobility Raw Score (Score out of 24.Lower " scores equate to lower levels of function) 19   Total Evaluation Time   Total Evaluation Time (Minutes) 15[SL1.1]        Revision History        User Key Date/Time User Provider Type Action    > SL1.1 1/25/2018  9:33 AM Maribell Neumann, PT Physical Therapist Sign            Progress Notes by Esme Alfredo RN at 1/24/2018  1:43 PM     Author:  Esme Alfredo RN Service:  (none) Author Type:      Filed:  1/24/2018  1:53 PM Date of Service:  1/24/2018  1:43 PM Creation Time:  1/24/2018  1:43 PM    Status:  Signed :  Esme Alfredo RN ()         Pt lives in a 2800 sq foot multil level home.  To get to her bedroom or bathroom she has to do at least 6 steps.  She feels she can manage this level of activity.  She requests resources for house keeping and running errands for support.  Pt given [facility] [employer] [facility], [facility] and [facility].  We also discussed her medicare benefit for home care support in case she needs to utilize it down the road.  Pt appreciative of resources.  Nissa PRITCHETT CTS 2629[RB1.1]     Revision History        User Key Date/Time User Provider Type Action    > RB1.1 1/24/2018  1:53 PM Esme Alfredo RN Case Manager Sign            Progress Notes by Zak Crespo DO at 1/24/2018 11:02 AM     Author:  Zak Crespo DO Service:  Hospitalist Author Type:  Physician    Filed:  1/24/2018 11:06 AM Date of Service:  1/24/2018 11:02 AM Creation Time:  1/24/2018 11:02 AM    Status:  Signed :  Zak Crespo DO (Physician)         [facility]  Hospitalist Progress Note  Zak Crespo DO 01/24/2018    Reason for Stay (Diagnosis): COPD exacerbation.         Assessment and Plan:      Summary of Stay: Berenice Krause is a 72 year old female admitted on 1/21/2018 with COPD exacerbation.  Problem List:   1. COPD exacerbation.  Decrease Prednisone to 40 mg/day for tomorrow's dose.  Continue Breo elliptical, duo nebs,  "and doxycycline.  Albuterol as needed.  2. Acute hypoxic respiratory failure.  Supplemental oxygen as needed.  3. Hypertension.  Continue lisinopril at 10 mg/day.  4. Hyperlipidemia.  Continue Lipitor.  5. Hyperglycemia.  Steroid-induced.  Hemoglobin A1c 5.8.  6. Occasional mild tachycardia.  Check TSH.  7. Deconditioning.  PT and OT consults.  8. Malnutrition.  RD consult.  DVT Prophylaxis: Ambulate every shift  Code Status: Full Code  Discharge Dispo: Home.  Estimated Disch Date / # of Days until Disch: 1-2        Interval History (Subjective):      Short of breath.  Feels weak.  Occasional cough.  No CP, F/C, N/V, or diarrhea.                  Physical Exam:      Last Vital Signs:  /68 (BP Location: Right arm)  Pulse 92  Temp 97.1  F (36.2  C) (Oral)  Resp 16  Ht 1.575 m (5' 2\")  Wt 36 kg (79 lb 6.4 oz)  SpO2 93%  BMI 14.52 kg/m2    Gen:  NAD, A&Ox3.  Eyes:  PERRL, sclera anicteric.  OP:  MMM, no lesions.  Neck:  Supple.  CV:  Regular, no murmurs.  Lung:  Wheeze b/l, normal effort.  Ab:  +BS, soft.  Skin:  Warm, dry to touch.  No rash.  Ext:  No pitting edema LE b/l.           Medications:      All current medications were reviewed with changes reflected in problem list.         Data:      All new lab and imaging data was reviewed.   Labs:[KR1.1]    Recent Labs  Lab 01/24/18  0757 01/22/18  0901   NA  --  133   POTASSIUM  --  3.9   CHLORIDE  --  97   CO2  --  28   ANIONGAP  --  8   GLC  --  209*   BUN  --  21   CR 0.54 0.58   GFRESTIMATED >90 >90   GFRESTBLACK >90 >90   RICHARD  --  8.5       Recent Labs  Lab 01/22/18  0901   WBC 3.3*   HGB 14.6   HCT 43.9   MCV 91   [KR1.2]      Imaging:[KR1.1]   No results found for this or any previous visit (from the past 24 hour(s)).[KR1.2]       Revision History        User Key Date/Time User Provider Type Action    > KR1.2 1/24/2018 11:06 AM Zak Crespo, DO Physician Sign     KR1.1 1/24/2018 11:02 AM Zak Crespo, DO Physician           " "  Progress Notes by Zak Crespo DO at 1/23/2018  2:17 PM     Author:  Zak Crespo DO Service:  Hospitalist Author Type:  Physician    Filed:  1/23/2018  2:22 PM Date of Service:  1/23/2018  2:17 PM Creation Time:  1/23/2018  2:17 PM    Status:  Signed :  Zak Crespo DO (Physician)         [facility]  Hospitalist Progress Note  Zak Crespo DO 01/23/2018    Reason for Stay (Diagnosis): COPD exacerbation         Assessment and Plan:      Summary of Stay: Berenice Krause is a 72 year old female admitted on 1/21/2018 with COPD exacerbation.  Problem List:   1. COPD exacerbation.  Continue prednisone, Breo elliptical, duo nebs, and doxycycline.  Albuterol as needed.  2. Acute hypoxic respiratory failure.  Supplemental oxygen as needed.  3. Hypertension.  Decrease lisinopril to 10 mg/day.  4. Hyperlipidemia.  Continue Lipitor.  5. Hyperglycemia.  Steroid-induced.  Hemoglobin A1c 5.8.  DVT Prophylaxis: Ambulate every shift  Code Status: Full Code  Discharge Dispo: Home.  Estimated Disch Date / # of Days until Disch: 1-2        Interval History (Subjective):      Short of breath.  Coughing.  No CP, F/C, N/V, or diarrhea.                  Physical Exam:      Last Vital Signs:  /66 (BP Location: Right arm)  Pulse 92  Temp 97.3  F (36.3  C) (Oral)  Resp 22  Ht 1.575 m (5' 2\")  Wt 36 kg (79 lb 6.4 oz)  SpO2 94%  BMI 14.52 kg/m2    Gen:  NAD, A&Ox3.  Eyes:  PERRL, sclera anicteric.  OP:  MMM, no lesions.  Neck:  Supple.  CV:  Regular, no murmurs.  Lung:  Wheeze b/l, normal effort.  Ab:  +BS, soft.  Skin:  Warm, dry to touch.  No rash.  Ext:  No pitting edema LE b/l.           Medications:      All current medications were reviewed with changes reflected in problem list.         Data:      All new lab and imaging data was reviewed.   Labs:[KR1.1]    Recent Labs  Lab 01/22/18  0901      POTASSIUM 3.9   CHLORIDE 97   CO2 28   ANIONGAP 8   *   BUN 21   CR " "0.58   GFRESTIMATED >90   GFRESTBLACK >90   RICHARD 8.5       Recent Labs  Lab 01/22/18  0901   WBC 3.3*   HGB 14.6   HCT 43.9   MCV 91   [KR1.2]      Imaging:[KR1.1]   Recent Results (from the past 24 hour(s))   XR Chest Port 1 View    Narrative    XR CHEST PORT 1 VW 1/23/2018 7:11 AM    COMPARISON: 1/21/2018    HISTORY: Worsening hypoxia.      Impression    IMPRESSION: Emphysematous changes again seen in both lungs and left  apical calcification again seen and unchanged. Median sternotomy wires  appear intact. No new focal airspace disease. No pleural effusion or  pneumothorax.    MANFRED NEAL MD[KR1.2]          Revision History        User Key Date/Time User Provider Type Action    > KR1.2 1/23/2018  2:22 PM Zak Crespo, DO Physician Sign     KR1.1 1/23/2018  2:17 PM Zak Crespo, DO Physician                   Procedure Notes     No notes of this type exist for this encounter.         Progress Notes - Therapies (Notes from 01/23/18 through 01/26/18)      Progress Notes by Yamila Barrett RT at 1/26/2018 12:42 AM     Author:  Yamila Barrett RT Service:  (none) Author Type:  Respiratory Therapist    Filed:  1/26/2018 12:46 AM Date of Service:  1/26/2018 12:42 AM Creation Time:  1/26/2018 12:42 AM    Status:  Signed :  Yamila Barrett RT (Respiratory Therapist)         [facility]     Date: 1/26/18  Admission Dx: COPD exacerbation  Pulmonary History: COPD  Home Nebulizer/MDI Use: Albuterol PRN  Home Oxygen: none  Acuity Level (RCAT flow sheet): Level 3  Aerosol Therapy initiated: Duoneb QID, Albuterol prn      Pulmonary Hygiene initiated: Cough and deep breathing      Volume Expansion initiated: Incentive spirometry      Current Oxygen Requirements: 1LPM  Current SpO2: 92%    Re-evaluation date: 1/29/18    Patient Education: Indications for nebulizers      See \"RT Assessments\" flow sheet for patient assessment scoring and Acuity Level Details.[GW1.1]                Revision History        User " Key Date/Time User Provider Type Action    > GW1.1 1/26/2018 12:46 AM Yamila Barrett RT Respiratory Therapist Sign            Progress Notes by Park Currie OT at 1/25/2018 12:32 PM     Author:  Park Currie OT Service:  Acute IP Rehab Author Type:  Occupational Therapist    Filed:  1/25/2018 12:32 PM Date of Service:  1/25/2018 12:32 PM Creation Time:  1/25/2018 12:32 PM    Status:  Signed :  Park Currie OT (Occupational Therapist)            01/25/18 1116   Quick Adds   Type of Visit Initial Occupational Therapy Evaluation   Living Environment   Lives With alone   Living Arrangements house   Number of Stairs to Enter Home 1   Number of Stairs Within Home 7   Transportation Available car;family or friend will provide   Living Environment Comment pt lives alone in multi story home, has supportive brother adn sister-in-law next door   Self-Care   Usual Activity Tolerance moderate   Current Activity Tolerance poor   Regular Exercise no   Equipment Currently Used at Home none   Activity/Exercise/Self-Care Comment pt independent at baseline, no O2 needs, no AD/DME; manages her home with frequent check-ins from brother and sister-in-law who live next door   Functional Level Prior   Ambulation 0-->independent   Transferring 0-->independent   Toileting 0-->independent  (standard height, vanity nearby)   Bathing 0-->independent  (walk-in shower)   Dressing 0-->independent   Fall history within last six months no   Prior Functional Level Comment pt reports independence at baseline; has 3 bathrooms in 4 story home, all standard toilets, has walk-in shower and tub showers   General Information   Onset of Illness/Injury or Date of Surgery - Date 01/24/18   Referring Physician Zak GIRON   Patient/Family Goals Statement eventually return home   Additional Occupational Profile Info/Pertinent History of Current Problem Berenice Krause is a 72 year old female admitted on 1/21/2018 with COPD  exacerbation.and acute hypoxic respiratory failure requiring O2; pt noticed onset while on a cruise, ended up staying in her room a lot and returned on 1/13, required wheelchair off cruise and in airport; Pt and brother report noticing cognitive changes related to memory for a short time now, unclear on timeline   Precautions/Limitations oxygen therapy device and L/min  (1 Lpm via NC during eval, baseline roomair)   Cognitive Status Examination   Orientation orientation to person, place and time   Level of Consciousness alert   Able to Follow Commands mild impairment   Memory impaired  (STM (0/5))   Cognitive Comment pt with cognitive deficits, see daily doc for further details   Visual Perception   Visual Perception Wears glasses   Sensory Examination   Sensory Quick Adds No deficits were identified   Pain Assessment   Patient Currently in Pain No   Range of Motion (ROM)   ROM Quick Adds No deficits were identified   Strength   Manual Muscle Testing Quick Adds No deficits were identified   Mobility   Bed Mobility Comments not assessed, pt declined out of bed activity   Transfer Skills   Transfer Comments not assessed, pt declined out of bed activity; per PT pt moving with FWW   Instrumental Activities of Daily Living (IADL)   Previous Responsibilities meal prep;housekeeping;laundry;shopping;medication management;finances;driving   IADL Comments pt has hired yardwork & snow removal   Activities of Daily Living Analysis   Impairments Contributing to Impaired Activities of Daily Living balance impaired;cognition impaired;strength decreased   General Therapy Interventions   Planned Therapy Interventions ADL retraining;IADL retraining   Clinical Impression   Criteria for Skilled Therapeutic Interventions Met yes, treatment indicated   OT Diagnosis impaired ability to manage ADL/IADLs safely and independently   Influenced by the following impairments impaired cognition, weakness, fatigue, impaired balance   Assessment of  "Occupational Performance 3-5 Performance Deficits   Identified Performance Deficits impaired ability to manage dressing, bathing, toilet, standing self cares and home management tasks   Clinical Decision Making (Complexity) Low complexity   Therapy Frequency daily   Predicted Duration of Therapy Intervention (days/wks) 3 days   Anticipated Discharge Disposition Home with Home Therapy;Transitional Care Facility   Risks and Benefits of Treatment have been explained. Yes   Patient, Family & other staff in agreement with plan of care Yes   Clinical Impression Comments pt demonstrates ability to benefit from skilled OT to improve overall safety and independence   [facility] TM \"6 Clicks\"   2016, Trustees of [facility], under license to [employer].  All rights reserved.   6 Clicks Short Forms Daily Activity Inpatient Short Form   [facility]-PAC  \"6 Clicks\" Daily Activity Inpatient Short Form   1. Putting on and taking off regular lower body clothing? 3 - A Little   2. Bathing (including washing, rinsing, drying)? 2 - A Lot   3. Toileting, which includes using toilet, bedpan or urinal? 3 - A Little   4. Putting on and taking off regular upper body clothing? 4 - None   5. Taking care of personal grooming such as brushing teeth? 3 - A Little   6. Eating meals? 4 - None   Daily Activity Raw Score (Score out of 24.Lower scores equate to lower levels of function) 19   Total Evaluation Time   Total Evaluation Time (Minutes) 12[SC1.1]        Revision History        User Key Date/Time User Provider Type Action    > SC1.1 1/25/2018 12:32 PM Park Currie OT Occupational Therapist Sign            Progress Notes by Maribell Neumann PT at 1/25/2018  9:33 AM     Author:  Maribell Neumann PT Service:  Acute IP Rehab Author Type:  Physical Therapist    Filed:  1/25/2018  9:33 AM Date of Service:  1/25/2018  9:33 AM Creation Time:  1/25/2018  9:33 AM    Status:  Signed :  Maribell Neumann PT " (Physical Therapist)          01/25/18 0757   Quick Adds   Type of Visit Initial PT Evaluation   Living Environment   Lives With alone   Living Arrangements house  (multi level home)   Home Accessibility stairs to enter home;stairs within home   Number of Stairs to Enter Home 1   Number of Stairs Within Home 7  (multi level split; no bathroom on main level)   Transportation Available car;family or friend will provide  (patient drives)   Living Environment Comment multi level split home   Self-Care   Usual Activity Tolerance moderate   Current Activity Tolerance poor   Regular Exercise no   Equipment Currently Used at Home none   Activity/Exercise/Self-Care Comment patient normally independent with mobility without an assistive device and no O2   Functional Level Prior   Ambulation 0-->independent   Transferring 0-->independent   Toileting 0-->independent   Bathing 0-->independent   Dressing 0-->independent   Eating 0-->independent   Communication 0-->understands/communicates without difficulty   Swallowing 0-->swallows foods/liquids without difficulty   Cognition 1 - attention or memory deficits  (lately- 1 week)   Fall history within last six months no   Which of the above functional risks had a recent onset or change? ambulation  (activity tolerance)   Prior Functional Level Comment patient normally independent with mobility; limited by SOB   General Information   Onset of Illness/Injury or Date of Surgery - Date 01/21/18   Referring Physician Zak Crespo, DO   Patient/Family Goals Statement fearful of return home   Pertinent History of Current Problem (include personal factors and/or comorbidities that impact the POC) Berenice Krause is a 72 year old female admitted on 1/21/2018 with COPD exacerbation.and acute hypoxic respiratory failure requiring O2; see medical record for further information   Precautions/Limitations oxygen therapy device and L/min  (1L)   General Observations patient noted to be anxious  and forgetful during session   Cognitive Status Examination   Orientation orientation to person, place and time   Level of Consciousness alert   Follows Commands and Answers Questions 75% of the time;100% of the time   Personal Safety and Judgment at risk behaviors demonstrated;impaired   Memory impaired   Cognitive Comment noted to be forgetful during conversation   Pain Assessment   Patient Currently in Pain No   Posture    Posture Kyphosis   Range of Motion (ROM)   ROM Comment WFL   Strength   Strength Comments significant functional weakness noted during mobility; buckling of LE's L > R   Bed Mobility   Bed Mobility Comments independent with HOB elevated   Transfer Skills   Transfer Comments sit<>stand with CGA/SBA initially; with fatigue and SOB patient needing CGA for safety secondary to unsteadiness   Gait   Gait Comments only able to tolerate gait distance of 60 feet with no device; on 1L O2; mod SOB and buckling of LE's; unable to tolerate stair trial today   Balance   Balance Comments impaired gait stability; worse with fatigue   General Therapy Interventions   Planned Therapy Interventions gait training;transfer training;strengthening;progressive activity/exercise   Clinical Impression   Criteria for Skilled Therapeutic Intervention yes, treatment indicated   PT Diagnosis impaired gait/transfers; weakness   Influenced by the following impairments weakness; decreased activity tolerance; SOB with minimal activity; impaired balance; impaired cognition   Functional limitations due to impairments impaired independence with functional mobility   Clinical Presentation Evolving/Changing   Clinical Presentation Rationale impaired cognition; lives alone; assist for mobility; lives in a multi level house; unable to access currently   Clinical Decision Making (Complexity) Moderate complexity   Therapy Frequency` daily   Predicted Duration of Therapy Intervention (days/wks) 4 days   Anticipated Equipment Needs at  "Discharge front wheeled walker   Anticipated Discharge Disposition Transitional Care Facility   Risk & Benefits of therapy have been explained Yes   Patient, Family & other staff in agreement with plan of care Yes   [facility] TM \"6 Clicks\"   2016, Trustees of [facility], under license to [employer].  All rights reserved.   6 Clicks Short Forms Basic Mobility Inpatient Short Form   [facility] AM-PAC  \"6 Clicks\" V.2 Basic Mobility Inpatient Short Form   1. Turning from your back to your side while in a flat bed without using bedrails? 4 - None   2. Moving from lying on your back to sitting on the side of a flat bed without using bedrails? 4 - None   3. Moving to and from a bed to a chair (including a wheelchair)? 3 - A Little   4. Standing up from a chair using your arms (e.g., wheelchair, or bedside chair)? 3 - A Little   5. To walk in hospital room? 3 - A Little   6. Climbing 3-5 steps with a railing? 2 - A Lot   Basic Mobility Raw Score (Score out of 24.Lower scores equate to lower levels of function) 19   Total Evaluation Time   Total Evaluation Time (Minutes) 15[SL1.1]        Revision History        User Key Date/Time User Provider Type Action    > SL1.1 1/25/2018  9:33 AM Maribell Neumann, PT Physical Therapist Sign            "

## 2018-01-21 NOTE — IP AVS SNAPSHOT
"          [place] [address] MEDICAL SURGICAL: [phone]                                              INTERAGENCY TRANSFER FORM - LAB / IMAGING / EKG / EMG RESULTS   2018                    Hospital Admission Date: 2018  SENA MCGOVERN   : 1945  Sex: Female        Attending Provider: Zak Crespo DO     Allergies:  No Known Allergies    Infection:  None   Service:  GENERAL MEDI    Ht:  1.575 m (5' 2\")   Wt:  36 kg (79 lb 6.4 oz)   Admission Wt:  37.2 kg (82 lb)    BMI:  14.52 kg/m 2   BSA:  1.25 m 2            Patient PCP Information     Provider PCP Type    Laureen Brizuela MD General         Lab Results - 3 Days      Basic metabolic panel [[id number]] (Abnormal)  Resulted: 18 0932, Result status: Final result    Ordering provider: Zak Crespo DO  18 0001 Resulting lab: [facility]    Specimen Information    Type Source Collected On   Blood  18 0902          Components       Value Reference Range Flag Lab   Sodium 138 133 - 144 mmol/L  FrRdHs   Potassium 3.4 3.4 - 5.3 mmol/L  FrRdHs   Chloride 100 94 - 109 mmol/L  FrRdHs   Carbon Dioxide 32 20 - 32 mmol/L  FrRdHs   Anion Gap 6 3 - 14 mmol/L  FrRdHs   Glucose 120 70 - 99 mg/dL H FrRdHs   Urea Nitrogen 19 7 - 30 mg/dL  FrRdHs   Creatinine 0.54 0.52 - 1.04 mg/dL  FrRdHs   GFR Estimate >90 >60 mL/min/1.7m2  FrRdHs   Comment:  Non  GFR Calc   GFR Estimate If Black >90 >60 mL/min/1.7m2  FrRdHs   Comment:  African American GFR Calc   Calcium 8.4 8.5 - 10.1 mg/dL L FrRdHs            TSH with free T4 reflex [[id number]]  Resulted: 18 1228, Result status: Final result    Ordering provider: Zak Crespo DO  18 0757 Resulting lab: [facility]    Specimen Information    Type Source Collected On     18 0757          Components       Value Reference Range Flag Lab   TSH 0.78 0.40 - 4.00 mU/L  FrRdHs            Creatinine [[id number]]  Resulted: 18 0825, Result " status: Final result    Ordering provider: Zak Crespo, DO  01/23/18 1800 Resulting lab: [facility]    Specimen Information    Type Source Collected On   Blood  01/24/18 0757          Components       Value Reference Range Flag Lab   Creatinine 0.54 0.52 - 1.04 mg/dL  [facility]   GFR Estimate >90 >60 mL/min/1.7m2  FrStLb   Comment:  Non  GFR Calc   GFR Estimate If Black >90 >60 mL/min/1.7m2  FrStLb   Comment:   GFR Calc            Lactic acid level STAT [[id number]]  Resulted: 01/23/18 1136, Result status: Final result    Ordering provider: Zak Crespo, DO  01/23/18 0947 Resulting lab: [facility]    Specimen Information    Type Source Collected On   Blood  01/23/18 1119          Components       Value Reference Range Flag Lab   Lactic Acid 1.1 0.7 - 2.0 mmol/L  [facility]            Testing Performed By     Lab - Abbreviation Name Director Address Valid Date Range    12 - [facility] Unknown [address] [place]  [place] [address] 05/08/15 1057 - Present    14 - [facility] Unknown [address] [place] [address] 05/08/15 1057 - Present            Unresulted Labs (24h ago through future)    Start       Ordered    01/24/18 0000  Creatinine  (Pharmacological Prophylaxis - enoxaparin (LOVENOX) *Use only if creatinine clearance is greater than 30 mL/min)  EVERY THREE DAYS,   Routine     Comments:  Repeat every 3 days while on VTE prophylaxis.    01/21/18 2305         Imaging Results - 3 Days      XR Chest Port 1 View [[id number]]  Resulted: 01/23/18 0746, Result status: Final result    Ordering provider: Shilpi Braga MD  01/23/18 0643 Resulted by: Mark Reddy MD    Performed: 01/23/18 0645 - 01/23/18 0711 Resulting lab: RADIOLOGY RESULTS    Narrative:       XR CHEST PORT 1 VW 1/23/2018 7:11 AM    COMPARISON: 1/21/2018    HISTORY: Worsening hypoxia.      Impression:        IMPRESSION: Emphysematous changes again seen in both lungs and left  apical calcification again seen and unchanged. Median sternotomy wires  appear intact. No new focal airspace disease. No pleural effusion or  pneumothorax.    MANFRED NEAL MD      Testing Performed By     Lab - Abbreviation Name Director Address Valid Date Range    104 - [facility] RADIOLOGY RESULTS Unknown Unknown 02/16/05 1553 - Present            Encounter-Level Documents:     There are no encounter-level documents.      Order-Level Documents:     There are no order-level documents.

## 2018-01-21 NOTE — IP AVS SNAPSHOT
"[place] [address] MEDICAL SURGICAL: [phone]                                              INTERAGENCY TRANSFER FORM - PHYSICIAN ORDERS   2018                    Hospital Admission Date: 2018  SENA MCGOVERN   : 1945  Sex: Female        Attending Provider: Zak Crespo DO     Allergies:  No Known Allergies    Infection:  None   Service:  GENERAL MEDI    Ht:  1.575 m (5' 2\")   Wt:  36 kg (79 lb 6.4 oz)   Admission Wt:  37.2 kg (82 lb)    BMI:  14.52 kg/m 2   BSA:  1.25 m 2            Patient PCP Information     Provider PCP Type    Laureen Brizuela MD General      ED Clinical Impression     Diagnosis Description Comment Added By Time Added    COPD exacerbation (H) [J44.1] COPD exacerbation (H) [J44.1]  Tad Menon MD 2018 10:05 PM      Hospital Problems as of 2018              Priority Class Noted POA    COPD exacerbation (H) Medium  2018 Yes      Non-Hospital Problems as of 2018     None      Code Status History     Date Active Date Inactive Code Status Order ID Comments User Context    2018 10:29 AM  Full Code [id number]  Radha nAgela MD Outpatient    2018 11:06 PM 2018 10:29 AM Full Code [id number]  Zak Crespo DO Inpatient         Medication Review      START taking        Dose / Directions Comments    guaiFENesin 20 mg/mL Soln solution   Commonly known as:  ROBITUSSIN        Dose:  10 mL   Take 10 mLs by mouth every 4 hours as needed for cough   Quantity:  1200 mL   Refills:  0        ipratropium - albuterol 0.5 mg/2.5 mg/3 mL 0.5-2.5 (3) MG/3ML neb solution   Commonly known as:  DUONEB        Dose:  3 mL   Take 1 vial (3 mLs) by nebulization 4 times daily Take for 2 more days scheduled and then can use prn   Quantity:  360 mL   Refills:  0        predniSONE 20 MG tablet   Commonly known as:  DELTASONE        Dose:  20 mg   Start taking on:  2018   Take 1 tablet (20 mg) by mouth daily 20 mg po qd x 3 days then 10 mg po qd x 3 " days then d/c   Refills:  0          CONTINUE these medications which have NOT CHANGED        Dose / Directions Comments    albuterol 108 (90 BASE) MCG/ACT Inhaler   Commonly known as:  PROAIR HFA/PROVENTIL HFA/VENTOLIN HFA        Dose:  2 puff   Inhale 2 puffs into the lungs every 6 hours as needed for shortness of breath / dyspnea or wheezing   Refills:  0        ASPIRIN ADULT LOW STRENGTH PO        Dose:  81 mg   Take 81 mg by mouth daily   Refills:  0        bimatoprost 0.01 % Soln   Commonly known as:  LUMIGAN        Dose:  1 drop   Place 1 drop into both eyes At Bedtime   Refills:  0        brimonidine 0.1 % ophthalmic solution   Commonly known as:  ALPHAGAN P        Dose:  1 drop   Place 1 drop into both eyes 2 times daily   Refills:  0        COSOPT 2-0.5 % ophthalmic solution   Generic drug:  dorzolamide-timolol        Dose:  1 drop   Place 1 drop into both eyes 2 times daily   Refills:  0        doxycycline 100 MG capsule   Commonly known as:  VIBRAMYCIN        Dose:  100 mg   Take 1 capsule (100 mg) by mouth 2 times daily for 4 days   Quantity:  8 capsule   Refills:  0        fluticasone-salmeterol 250-50 MCG/DOSE diskus inhaler   Commonly known as:  ADVAIR        Dose:  1 puff   Inhale 1 puff into the lungs every 12 hours   Refills:  0        LIPITOR PO        Dose:  10 mg   Take 10 mg by mouth daily   Refills:  0        LISINOPRIL PO        Dose:  20 mg   Take 20 mg by mouth daily   Refills:  0                  Further instructions from your care team       NUTRITION DISCHARGE INSTRUCTIONS  - recommend high protein oral supplement if appetite/intakes remain poor, or if further weight loss is observed. Recommend multivitamin supplement.     After Care     General info for SNF       Length of Stay Estimate: Short Term Care: Estimated # of Days <30  Condition at Discharge: Improving  Level of care:skilled   Rehabilitation Potential: Good  Admission H&P remains valid and up-to-date: Yes  Recent  Chemotherapy: N/A  Use Nursing Home Standing Orders: Yes       Mantoux instructions       Give two-step Mantoux (PPD) Per Facility Policy Yes             Procedures     Oxygen - Nasal cannula       1-2 Lpm by nasal cannula to keep O2 sats 92% or greater. Wean as able             Referrals     Physical Therapy Adult Consult       Evaluate and treat as clinically indicated.    Reason:  Deconditioning             Follow-Up Appointment Instructions     Future Labs/Procedures    Follow Up and recommended labs and tests     Comments:    F/U with TCU NP/MD in 7 days for recheck post hospitalization and to recheck BMP and BP after resumption of home lisinopril dosing of 20 mg per day  On discharge from U she plans to f/u with Dr. Blank of [facility] to establish care-at that visit she should be evaluated for osteoporoses if not already completed and to get set up for formal pulmonary function studies to evaluate baseline lung function.      Follow-Up Appointment Instructions     Follow Up and recommended labs and tests       F/U with TCU NP/MD in 7 days for recheck post hospitalization and to recheck BMP and BP after resumption of home lisinopril dosing of 20 mg per day  On discharge from U she plans to f/u with Dr. Blank of [facility] to establish care-at that visit she should be evaluated for osteoporoses if not already completed and to get set up for formal pulmonary function studies to evaluate baseline lung function.             Statement of Approval     Ordered          01/26/18 1041  I have reviewed and agree with all the recommendations and orders detailed in this document.  EFFECTIVE NOW     Approved and electronically signed by:  Radha Angela MD

## 2018-01-21 NOTE — IP AVS SNAPSHOT
` ` Patient Information     Patient Name Sex Chandler Hillsrosario CALDERON ([id number]) Female 1945       Room Bed    0500 0500-01      Patient Demographics     Address Phone    [address] [place] [address] [phone] (Home)  none (Work)  [phone] (Mobile)      Patient Ethnicity & Race     Ethnic Group Patient Race     White      Emergency Contact(s)     Name Relation Home Work Mobile    Marin,Giorgio Brother [phone] none [phone]      Documents on File        Status Date Received Description       Documents for the Patient    Privacy Notice - [place] Received 18     Insurance Card Received 18     External Medication Information Consent       Patient ID Received 18     Consent for Services - Hospital/Clinic  ()      Consent for EHR Access Received 18     [facility] Specified Other       Consent for Services/Privacy Notice - Hospital/Clinic Received 18     Care Everywhere Prospective Auth Received 18     Insurance Card Received 18        Documents for the Encounter    CMS IM for Patient Signature Received 18     CMS IM for Patient Signature Received 18 2nd      Admission Information     Attending Provider Admitting Provider Admission Type Admission Date/Time    Zak Crespo, DO Zak Crespo, DO Emergency 18    Discharge Date Hospital Service Auth/Cert Status Service Area     [facility] SERVICES    Unit Room/Bed Admission Status        5 MEDICAL SURGICAL 0500/0500-01 Admission (Confirmed)       Admission     Complaint    COPD exacerbation (H)      Hospital Account     Name Acct ID Class Status Primary Coverage    Berenice Krause [id number] Inpatient Open MEDICARE - MEDICARE FOR HB SUPPLEMENT            Guarantor Account (for Hospital Account #[id number])     Name Relation to Pt Service Area Active? Acct Type    Berenice Krause Self FCS Yes Personal/Family    Address  Phone          [address] [place] [address] [phone](H)              Coverage Information (for Hospital Account #[id number])     1. MEDICARE/MEDICARE FOR HB SUPPLEMENT     F/O Payor/Plan Precert #    MEDICARE/MEDICARE FOR HB SUPPLEMENT     Subscriber Subscriber #    Berenice Krause [id number]    Address Phone    [employer]  [address]  [place] [address] [phone]          2. BCBS/BCBS PLATINUM BLUE     F/O Payor/Plan Precert #    BCBS/BCBS PLATINUM BLUE     Subscriber Subscriber #    Berenice Krause [id number]    Address Phone    [address] [address]  [place] [address] [phone]

## 2018-01-21 NOTE — IP AVS SNAPSHOT
` `     [place] [address] MEDICAL SURGICAL: [phone]            Medication Administration Report for Berenice Krause as of 01/26/18 1374   Legend:    Given Hold Not Given Due Canceled Entry Other Actions    Time Time (Time) Time  Time-Action       Inactive    Active    Linked        Medications 01/20/18 01/21/18 01/22/18 01/23/18 01/24/18 01/25/18 01/26/18    acetaminophen (TYLENOL) tablet 650 mg  Dose: 650 mg  Freq: EVERY 4 HOURS PRN Route: PO  PRN Reason: mild pain  Start: 01/21/18 2305   Admin Instructions: Alternate ibuprofen (if ordered) with acetaminophen.  Maximum acetaminophen dose from all sources = 75 mg/kg/day not to exceed 4 grams/day.               albuterol neb solution 2.5 mg  Dose: 2.5 mg  Freq: EVERY 2 HOURS PRN Route: NEBULIZATION  PRN Comment: dyspnea  Start: 01/21/18 2305       0603 (2.5 mg)-Given              aspirin chewable tablet 81 mg  Dose: 81 mg  Freq: DAILY Route: PO  Start: 01/22/18 0900      0838 (81 mg)-Given        0823 (81 mg)-Given        0802 (81 mg)-Given        0842 (81 mg)-Given        0932 (81 mg)-Given           atorvastatin (LIPITOR) tablet 10 mg  Dose: 10 mg  Freq: DAILY Route: PO  Start: 01/22/18 0900      0838 (10 mg)-Given        0823 (10 mg)-Given        0802 (10 mg)-Given        0842 (10 mg)-Given        0932 (10 mg)-Given           bimatoprost (LUMIGAN) 0.01 % ophthalmic drops 1 drop  Dose: 1 drop  Freq: AT BEDTIME Route: Both Eyes  Start: 01/21/18 2315      0101 (1 drop)-Given       2114 (1 drop)-Given        2137 (1 drop)-Given        2106 (1 drop)-Given        2127 (1 drop)-Given        [ ] 2200           bisacodyl (DULCOLAX) Suppository 10 mg  Dose: 10 mg  Freq: DAILY PRN Route: RE  PRN Reason: constipation  Start: 01/21/18 2305   Admin Instructions: Hold for loose stools.  This is the third step of a three step constipation treatment.               brimonidine (ALPHAGAN P) 0.1 % ophthalmic solution 1 drop  Dose: 1 drop  Freq: 2 TIMES DAILY Route: Both  Eyes  Start: 01/22/18 0900      0840 (1 drop)-Given       2114 (1 drop)-Given        0826 (1 drop)-Given       2137 (1 drop)-Given        0904 (1 drop)-Given       2106 (1 drop)-Given        0844 (1 drop)-Given       2127 (1 drop)-Given        0935 (1 drop)-Given       [ ] 2100           dorzolamide-timolol (COSOPT) ophthalmic solution 1 drop  Dose: 1 drop  Freq: 2 TIMES DAILY Route: Both Eyes  Start: 01/22/18 0900      0840 (1 drop)-Given       2114 (1 drop)-Given        0826 (1 drop)-Given       2137 (1 drop)-Given        0904 (1 drop)-Given       2106 (1 drop)-Given        0914 (1 drop)-Given       2127 (1 drop)-Given        0937 (1 drop)-Given       [ ] 2100           doxycycline (VIBRA-TABS) tablet 100 mg  Dose: 100 mg  Freq: 2 TIMES DAILY Route: PO  Indications Comment: bronchitis  Start: 01/21/18 2315   Admin Instructions: Administer at least 2 hours before or after aluminum, calcium, iron, zinc or magnesium containing products.      2355 (100 mg)-Given        0837 (100 mg)-Given       2113 (100 mg)-Given        0823 (100 mg)-Given       2136 (100 mg)-Given        0802 (100 mg)-Given       2106 (100 mg)-Given        0842 (100 mg)-Given       2130 (100 mg)-Given        0931 (100 mg)-Given       [ ] 2100           fluticasone-vilanterol (BREO ELLIPTA) 200-25 MCG/INH oral inhaler 1 puff  Dose: 1 puff  Freq: DAILY Route: IN  Start: 01/22/18 0800   Admin Instructions: Formulary alternate for ADVAIR 250-50 diskus @ 1 puff BID (patient's home medication) while in hospital.  Rinse mouth after use.       (0745)-Not Given        0748 (1 puff)-Given        0710 (1 puff)-Given        0757 (1 puff)-Given        0710 (1 puff)-Given           guaiFENesin (ROBITUSSIN) 20 mg/mL solution 10 mL  Dose: 10 mL  Freq: EVERY 4 HOURS PRN Route: PO  PRN Reason: cough  Start: 01/21/18 2305              ipratropium - albuterol 0.5 mg/2.5 mg/3 mL (DUONEB) neb solution 3 mL  Dose: 3 mL  Freq: 4 TIMES DAILY Route: NEBULIZATION  Start:  "01/22/18 0800      0745 (3 mL)-Given       1110 (3 mL)-Given       1523 (3 mL)-Given       2012 (3 mL)-Given [C]        0746 (3 mL)-Given       1252 (3 mL)-Given       1617 (3 mL)-Given       1958 (3 mL)-Given        0710 (3 mL)-Given       1114 (3 mL)-Given       1511 (3 mL)-Given       1912 (3 mL)-Given        0757 (3 mL)-Given       1214 (3 mL)-Given       1534 (3 mL)-Given       1907 (3 mL)-Given        0709 (3 mL)-Given       1107 (3 mL)-Given       [ ] 1600       [ ] 2000           lidocaine (LMX4) kit  Freq: EVERY 1 HOUR PRN Route: Top  PRN Reason: pain  PRN Comment: with VAD insertion or accessing implanted port.  Start: 01/21/18 2305   Admin Instructions: Do NOT give if patient has a history of allergy to any local anesthetic or any \"omega\" product.   Apply 30 minutes prior to VAD insertion or port access.  MAX Dose:  2.5 g (  of 5 g tube)               lidocaine 1 % 1 mL  Dose: 1 mL  Freq: EVERY 1 HOUR PRN Route: OTHER  PRN Comment: mild pain with VAD insertion or accessing implanted port  Start: 01/21/18 2305   Admin Instructions: Do NOT give if patient has a history of allergy to any local anesthetic or any \"omega\" product. MAX dose 1 mL subcutaneous OR intradermal in divided doses.               lisinopril (PRINIVIL/ZESTRIL) tablet 10 mg  Dose: 10 mg  Freq: DAILY Route: PO  Start: 01/24/18 0900        0802 (10 mg)-Given        0842 (10 mg)-Given        0931 (10 mg)-Given           melatonin tablet 1 mg  Dose: 1 mg  Freq: AT BEDTIME PRN Route: PO  PRN Reason: sleep  Start: 01/21/18 2305   Admin Instructions: Do not give unless at least 6 hours of uninterrupted sleep is expected.               naloxone (NARCAN) injection 0.1-0.4 mg  Dose: 0.1-0.4 mg  Freq: EVERY 2 MIN PRN Route: IV  PRN Reason: opioid reversal  Start: 01/21/18 2305   Admin Instructions: For respiratory rate LESS than or EQUAL to 8.  Partial reversal dose:  0.1 mg titrated q 2 minutes for Analgesia Side Effects Monitoring Sedation Level of " 3 (frequently drowsy, arousable, drifts to sleep during conversation).Full reversal dose:  0.4 mg bolus for Analgesia Side Effects Monitoring Sedation Level of 4 (somnolent, minimal or no response to stimulation).  For ordered doses up to 2mg give IVP. Give each 0.4mg over 15 seconds in emergency situations. For non-emergent situations further dilute in 9mL of NS to facilitate titration of response.               nitroGLYcerin (NITROSTAT) sublingual tablet 0.4 mg  Dose: 0.4 mg  Freq: EVERY 5 MIN PRN Route: SL  PRN Reason: chest pain  Start: 01/21/18 2305   Admin Instructions: Maximum 3 doses in 15 minutes.  Notify provider if no relief after 3 doses.    Do NOT give nitroglycerin SL IF the patient has taken avanafil (STENDRA), sildenafil (VIAGRA) or (REVATIO) within the last 8 hours, vardenafil (LEVITRA) or (STAXYN) within the last 18 hours, tadalafil (CIALIS) or (ADCIRCA) within the last 36 hours. Inform provider if patient has taken one of these medications.  If patient is still having acute angina requiring treatment, an alternative treatment option may be used such as: IV beta-blocker [2.5 mg - 5 mg metoprolol (LOPRESSOR)] if ordered by a provider.               ondansetron (ZOFRAN-ODT) ODT tab 4 mg  Dose: 4 mg  Freq: EVERY 6 HOURS PRN Route: PO  PRN Reasons: nausea,vomiting  Start: 01/21/18 2305   Admin Instructions: This is Step 1 of nausea and vomiting management.  If nausea not resolved in 15 minutes, go to Step 2 prochlorperazine (COMPAZINE). Do not push through foil backing. Peel back foil and gently remove. Place on tongue immediately. Administration with liquid unnecessary  With dry hands, peel back foil backing and gently remove tablet; do not push oral disintegrating tablet through foil backing; administer immediately on tongue and oral disintegrating tablet dissolves in seconds; then swallow with saliva; liquid not required.              Or  ondansetron (ZOFRAN) injection 4 mg  Dose: 4 mg  Freq: EVERY  6 HOURS PRN Route: IV  PRN Reasons: nausea,vomiting  Start: 01/21/18 2305   Admin Instructions: This is Step 1 of nausea and vomiting management.  If nausea not resolved in 15 minutes, go to Step 2 prochlorperazine (COMPAZINE).  Irritant. For ordered doses up to 4 mg, give IV Push undiluted over 2-5 minutes.               oxyCODONE IR (ROXICODONE) tablet 5 mg  Dose: 5 mg  Freq: EVERY 4 HOURS PRN Route: PO  PRN Reason: moderate to severe pain  Start: 01/21/18 2305   Admin Instructions: Start with the lowest dose.  May adjust dose by 5 mg every 3 hours as needed for pain control or improvement in physical function.  Hold dose for analgesic side effects.  Notify provider to assess for uncontrolled pain or analgesic side effects.               polyethylene glycol (MIRALAX/GLYCOLAX) Packet 17 g  Dose: 17 g  Freq: DAILY PRN Route: PO  PRN Reason: constipation  Start: 01/21/18 2305   Admin Instructions: Give in 8oz of  water, juice, or soda. Hold for loose stools.  This is the second step of a three step constipation treatment.  1 Packet = 17 grams. Mixed prescribed dose in 8 ounces of water. Follow with 8 oz. of water.               predniSONE (DELTASONE) tablet 40 mg  Dose: 40 mg  Freq: DAILY Route: PO  Start: 01/25/18 0900         0842 (40 mg)-Given        0932 (40 mg)-Given           prochlorperazine (COMPAZINE) injection 5 mg  Dose: 5 mg  Freq: EVERY 6 HOURS PRN Route: IV  PRN Reasons: nausea,vomiting  Start: 01/21/18 2305   Admin Instructions: This is Step 2 of nausea and vomiting management. Give if nausea not resolved 15 minutes after giving ondansetron (ZOFRAN). If nausea not resolved in 15 minutes, go to Step 3 metoclopramide (REGLAN), if ordered.  For ordered doses up to 10 mg, give IV Push undiluted. Each 5mg over 1 minute.              Or  prochlorperazine (COMPAZINE) tablet 5 mg  Dose: 5 mg  Freq: EVERY 6 HOURS PRN Route: PO  PRN Reason: vomiting  Start: 01/21/18 2305   Admin Instructions: This is Step 2 of  nausea and vomiting management. Give if nausea not resolved 15 minutes after giving ondansetron (ZOFRAN). If nausea not resolved in 15 minutes, go to Step 3 metoclopramide (REGLAN), if ordered.              Or  prochlorperazine (COMPAZINE) Suppository 12.5 mg  Dose: 12.5 mg  Freq: EVERY 12 HOURS PRN Route: RE  PRN Reasons: nausea,vomiting  Start: 01/21/18 2305   Admin Instructions: This is Step 2 of nausea and vomiting management. Give if nausea not resolved 15 minutes after giving ondansetron (ZOFRAN). If nausea not resolved in 15 minutes, go to Step 3 metoclopramide (REGLAN), if ordered.               senna-docusate (SENOKOT-S;PERICOLACE) 8.6-50 MG per tablet 1 tablet  Dose: 1 tablet  Freq: 2 TIMES DAILY PRN Route: PO  PRN Reason: constipation  Start: 01/21/18 2305   Admin Instructions: If no bowel movement in 24 hours, increase to 2 tablets PO.  Hold for loose stools.  This is the first step of a three step constipation treatment.              Or  senna-docusate (SENOKOT-S;PERICOLACE) 8.6-50 MG per tablet 2 tablet  Dose: 2 tablet  Freq: 2 TIMES DAILY PRN Route: PO  PRN Reason: constipation  Start: 01/21/18 2305   Admin Instructions: Hold for loose stools.  This is the first step of a three step constipation treatment.               sodium chloride (PF) 0.9% PF flush 3 mL  Dose: 3 mL  Freq: EVERY 8 HOURS Route: IK  Start: 01/21/18 2315   Admin Instructions: And Q1H PRN, to lock peripheral IV dormant line.       0032 (3 mL)-Given       0841 (3 mL)-Given       1552 (3 mL)-Given        0057 (3 mL)-Given       0823 (3 mL)-Given       1641 (3 mL)-Given        0051 (3 mL)-Given       0802 (3 mL)-Given       1819 (3 mL)-Given        (0141)-Not Given       0914 (3 mL)-Given       1551 (3 mL)-Given       2301 (3 mL)-Given        (0938)-Not Given       [ ] 1600           sodium chloride (PF) 0.9% PF flush 3 mL  Dose: 3 mL  Freq: EVERY 1 HOUR PRN Route: IK  PRN Reason: line flush  PRN Comment: for peripheral IV flush post IV  meds  Start: 01/21/18 2305             Discontinued Medications  Medications 01/20/18 01/21/18 01/22/18 01/23/18 01/24/18 01/25/18 01/26/18         Dose: 20 mg  Freq: DAILY Route: PO  Start: 01/22/18 0900   End: 01/23/18 1420      0837 (20 mg)-Given        0823 (20 mg)-Given       1420-Med Discontinued            Dose: 60 mg  Freq: DAILY Route: PO  Start: 01/22/18 0900   End: 01/24/18 1104      0838 (60 mg)-Given        0823 (60 mg)-Given        0802 (60 mg)-Given       1104-Med Discontinued

## 2018-01-21 NOTE — IP AVS SNAPSHOT
"` `           [place] [address] MEDICAL SURGICAL: [phone]                                              INTERAGENCY TRANSFER FORM - NURSING   2018                    Hospital Admission Date: 2018  SENA MCGOVERN   : 1945  Sex: Female        Attending Provider: Zak Crespo DO     Allergies:  No Known Allergies    Infection:  None   Service:  GENERAL MEDI    Ht:  1.575 m (5' 2\")   Wt:  36 kg (79 lb 6.4 oz)   Admission Wt:  37.2 kg (82 lb)    BMI:  14.52 kg/m 2   BSA:  1.25 m 2            Patient PCP Information     Provider PCP Type    Laureen Brizuela MD General      Current Code Status     Date Active Code Status Order ID Comments User Context       Prior      Code Status History     Date Active Date Inactive Code Status Order ID Comments User Context    2018 10:29 AM  Full Code [id number]  Radha Angela MD Outpatient    2018 11:06 PM 2018 10:29 AM Full Code [id number]  Zak Crespo DO Inpatient      Advance Directives        Scanned docmt in ACP Activity?           Yes        Hospital Problems as of 2018              Priority Class Noted POA    COPD exacerbation (H) Medium  2018 Yes      Non-Hospital Problems as of 2018     None      Immunizations     None         END      ASSESSMENT     Discharge Profile Flowsheet     EXPECTED DISCHARGE     Patient's communication style  spoken language (English or Bilingual) 18 0028    Expected Discharge Date  18 ( home HC vs TCU) 18 1314   FINAL RESOURCES      DISCHARGE NEEDS ASSESSMENT     PAS Number  [id number] 18 1005    Anticipated Changes Related to Illness  none 18 0028   SKIN      Equipment Currently Used at Home  none 18 1224   Inspection of bony prominences  Full 18 0314    Transportation Available  car;family or friend will provide 18 1224   Full except areas not inspected   Buttock, left;Buttock, right;Sacrum;Coccyx 18 1817    # of Referrals Placed by " "CTS  Community Resources 01/24/18 1353   Procedural focused assessment (identify areas inspected)   Abdomen (chest) 01/24/18 0119    Does Patient Need a Referral for Clinic CC  No 01/22/18 1359   Inspection under devices  Full 01/25/18 0314    Confirm patient is eligible for [facility] telemonitoring  Yes 01/22/18 1359   Skin WDL  ex;characteristics 01/25/18 2006    Discussed w/pt use of [facility] telemonitoring and told [facility] staff would call within 72 hours  Yes - patient requests do not call 01/22/18 1359   Skin Temperature  warm 01/25/18 2006    GASTROINTESTINAL (ADULT,PEDIATRIC,OB)     Skin Moisture  dry 01/25/18 2006    GI WDL  WDL 01/25/18 2006   Skin Integrity  other (see comments) (reddness L elbow) 01/25/18 2006    Last Bowel Movement  01/24/18 (per pt) 01/25/18 0920   SAFETY      Passing flatus  yes 01/25/18 2006   Safety WDL  WDL 01/25/18 2006    COMMUNICATION ASSESSMENT     All Alarms  none present 01/25/18 2006                 Assessment WDL (Within Defined Limits) Definitions           Safety WDL     Effective: 09/28/15    Row Information: <b>WDL Definition:</b> Bed in low position, wheels locked; call light in reach; upper side rails up x 2; ID band on<br> <font color=\"gray\"><i>Item=AS safety wdl>>List=AS safety wdl>>Version=F14</i></font>      Skin WDL     Effective: 09/28/15    Row Information: <b>WDL Definition:</b> Warm; dry; intact; elastic; without discoloration; pressure points without redness<br> <font color=\"gray\"><i>Item=AS skin wdl>>List=AS skin wdl>>Version=F14</i></font>      Vitals     Vital Signs Flowsheet     VITAL SIGNS     0-10 Pain Scale  0 01/23/18 0822    Temp  97.5  F (36.4  C) 01/26/18 0738   HEIGHT AND WEIGHT      Temp src  Oral 01/26/18 0738   Height  1.575 m (5' 2\") 01/22/18 0534    Resp  18 01/26/18 0738   Height Method  Stated 01/22/18 0534    Pulse  92 01/23/18 0101   Height Method  Stated 01/21/18 2108    Heart Rate  92 01/26/18 0738   Weight  36 kg (79 lb 6.4 oz) 01/21/18 " 2336    Pulse/Heart Rate Source  Monitor 01/26/18 0738   POSITIONING      BP  135/80 01/26/18 0738   Body Position  independently positioning 01/25/18 2301    BP Location  Right arm 01/25/18 1550   Head of Bed (HOB)  HOB at 20-30 degrees 01/25/18 2301    OXYGEN THERAPY     Positioning/Transfer Devices  pillows;in use 01/25/18 0314    SpO2  94 % 01/26/18 1109   Chair  Upright in chair 01/25/18 1550    O2 Device  Nasal cannula 01/26/18 1109   DAILY CARE      Oxygen Delivery  1 LPM 01/26/18 1109   Activity Management  activity adjusted per tolerance;ambulated to bathroom 01/26/18 0305    PAIN/COMFORT     Activity Assistance Provided  assistance, stand-by 01/26/18 0305    Patient Currently in Pain  denies 01/25/18 2301   Symptoms Noted During/After Activity  shortness of breath 01/25/18 1550    Preferred Pain Scale  number (Numeric Rating Pain Scale) 01/24/18 1814   Additional Documentation  Symptoms Noted After/During Activity (Row) 01/25/18 1017    Patient's Stated Pain Goal  No pain 01/23/18 0822                 Patient Lines/Drains/Airways Status    Active LINES/DRAINS/AIRWAYS     Name: Placement date: Placement time: Site: Days: Last dressing change:    Peripheral IV 01/21/18 Right 01/21/18 2128      4             Patient Lines/Drains/Airways Status    Active PICC/CVC     None            Intake/Output Detail Report     Date Intake   Output    Shift P.O. IV Piggyback Total Total       Noc 01/24/18 2300 - 01/25/18 0659 200 -- 200 -- 200    Day 01/25/18 0700 - 01/25/18 1459 120 -- 120 -- 120    Martina 01/25/18 1500 - 01/25/18 2259 120 -- 120 -- 120    Noc 01/25/18 2300 - 01/26/18 0659 -- -- -- -- 0    Day 01/26/18 0700 - 01/26/18 1459 -- -- -- -- 0      Last Void/BM       Most Recent Value    Urine Occurrence 1 at 01/26/2018 0305    Stool Occurrence 1 [per pt report] at 01/22/2018 1409      Case Management/Discharge Planning     Case Management/Discharge Planning Flowsheet     REFERRAL INFORMATION     Major  Change/Loss/Stressor  hospitalization 01/25/18 1552    Did the Initial Social Work Assessment result in a Social Work Case?  Yes 01/22/18 1359   EXPECTED DISCHARGE      Admission Type  inpatient 01/22/18 1359   Expected Discharge Date  01/26/18 ( home HC vs TCU) 01/25/18 1314    Arrived From  home or self-care 01/22/18 1359   DISCHARGE PLANNING      Referral Source  case finding;high risk screening 01/22/18 1359   Anticipated Changes Related to Illness  none 01/22/18 0028    New Steerage to  Clinics?  No 01/22/18 1359   Transportation Available  car;family or friend will provide 01/25/18 1224    # of Referrals Placed by CTS  Community Resources 01/24/18 1353   Does Patient Need a Referral for Clinic CC  No 01/22/18 1359    Reason For Consult  care coordination/care conference;discharge planning 01/22/18 1359   FINAL RESOURCES      Record Reviewed  clinical discipline documentation;history and physical;medical record;patient profile;plan of care 01/22/18 1359   Equipment Currently Used at Home  none 01/25/18 1224    CTS Assigned to Can Azar RN CTS 01/22/18 1359   PAS Number  [id number] 01/26/18 1005    Primary Care Clinic Name  [facility] 01/22/18 1359   ABUSE RISK SCREEN      Primary Care MD Name  Dr Brizuela 01/22/18 1359   QUESTION TO PATIENT:  Has a member of your family or a partner(now or in the past) intimidated, hurt, manipulated, or controlled you in any way?  no 01/22/18 0028    LIVING ENVIRONMENT     QUESTION TO PATIENT: Do you feel safe going back to the place where you are living?  yes 01/22/18 0028    Lives With  alone 01/25/18 1123   OBSERVATION: Is there reason to believe there has been maltreatment of a vulnerable adult (ie. Physical/Sexual/Emotional abuse, self neglect, lack of adequate food, shelter, medical care, or financial exploitation)?  no 01/22/18 0028    Living Arrangements  [place] 01/25/18 1123   OTHER      Provides Primary Care For  no one 01/22/18 0028   Are you depressed or being treated for  depression?  No 01/22/18 0028    Able to Return to Prior Living Arrangements  yes 01/22/18 1359   HOMICIDE RISK      COPING/STRESS     Feels Like Hurting Others  no 01/22/18 0028

## 2018-01-22 LAB
ANION GAP SERPL CALCULATED.3IONS-SCNC: 8 MMOL/L (ref 3–14)
BUN SERPL-MCNC: 21 MG/DL (ref 7–30)
CALCIUM SERPL-MCNC: 8.5 MG/DL (ref 8.5–10.1)
CHLORIDE SERPL-SCNC: 97 MMOL/L (ref 94–109)
CO2 SERPL-SCNC: 28 MMOL/L (ref 20–32)
CREAT SERPL-MCNC: 0.58 MG/DL (ref 0.52–1.04)
ERYTHROCYTE [DISTWIDTH] IN BLOOD BY AUTOMATED COUNT: 13.2 % (ref 10–15)
GFR SERPL CREATININE-BSD FRML MDRD: >90 ML/MIN/1.7M2
GLUCOSE SERPL-MCNC: 209 MG/DL (ref 70–99)
HBA1C MFR BLD: 5.8 % (ref 4.3–6)
HCT VFR BLD AUTO: 43.9 % (ref 35–47)
HGB BLD-MCNC: 14.6 G/DL (ref 11.7–15.7)
LACTATE BLD-SCNC: 1.1 MMOL/L (ref 0.7–2)
MCH RBC QN AUTO: 30.2 PG (ref 26.5–33)
MCHC RBC AUTO-ENTMCNC: 33.3 G/DL (ref 31.5–36.5)
MCV RBC AUTO: 91 FL (ref 78–100)
PLATELET # BLD AUTO: 305 10E9/L (ref 150–450)
POTASSIUM SERPL-SCNC: 3.9 MMOL/L (ref 3.4–5.3)
PROCALCITONIN SERPL-MCNC: <0.05 NG/ML
RBC # BLD AUTO: 4.84 10E12/L (ref 3.8–5.2)
SODIUM SERPL-SCNC: 133 MMOL/L (ref 133–144)
WBC # BLD AUTO: 3.3 10E9/L (ref 4–11)

## 2018-01-22 PROCEDURE — 83036 HEMOGLOBIN GLYCOSYLATED A1C: CPT | Performed by: INTERNAL MEDICINE

## 2018-01-22 PROCEDURE — 83605 ASSAY OF LACTIC ACID: CPT | Performed by: INTERNAL MEDICINE

## 2018-01-22 PROCEDURE — 25000125 ZZHC RX 250: Performed by: INTERNAL MEDICINE

## 2018-01-22 PROCEDURE — 94640 AIRWAY INHALATION TREATMENT: CPT

## 2018-01-22 PROCEDURE — 12000007 ZZH R&B INTERMEDIATE

## 2018-01-22 PROCEDURE — 36415 COLL VENOUS BLD VENIPUNCTURE: CPT | Performed by: INTERNAL MEDICINE

## 2018-01-22 PROCEDURE — 85027 COMPLETE CBC AUTOMATED: CPT | Performed by: INTERNAL MEDICINE

## 2018-01-22 PROCEDURE — 40000274 ZZH STATISTIC RCP CONSULT EA 30 MIN

## 2018-01-22 PROCEDURE — 25000131 ZZH RX MED GY IP 250 OP 636 PS 637: Mod: GY | Performed by: INTERNAL MEDICINE

## 2018-01-22 PROCEDURE — 25000132 ZZH RX MED GY IP 250 OP 250 PS 637: Mod: GY | Performed by: INTERNAL MEDICINE

## 2018-01-22 PROCEDURE — A9270 NON-COVERED ITEM OR SERVICE: HCPCS | Mod: GY | Performed by: INTERNAL MEDICINE

## 2018-01-22 PROCEDURE — 94640 AIRWAY INHALATION TREATMENT: CPT | Mod: 76

## 2018-01-22 PROCEDURE — 40000275 ZZH STATISTIC RCP TIME EA 10 MIN

## 2018-01-22 PROCEDURE — 99232 SBSQ HOSP IP/OBS MODERATE 35: CPT | Performed by: INTERNAL MEDICINE

## 2018-01-22 PROCEDURE — 40000893 ZZH STATISTIC PT IP EVAL DEFER: Performed by: PHYSICAL THERAPIST

## 2018-01-22 PROCEDURE — 80048 BASIC METABOLIC PNL TOTAL CA: CPT | Performed by: INTERNAL MEDICINE

## 2018-01-22 PROCEDURE — 12000000 ZZH R&B MED SURG/OB

## 2018-01-22 RX ADMIN — DORZOLAMIDE HYDROCHLORIDE AND TIMOLOL MALEATE 1 DROP: 20; 5 SOLUTION/ DROPS OPHTHALMIC at 21:14

## 2018-01-22 RX ADMIN — DORZOLAMIDE HYDROCHLORIDE AND TIMOLOL MALEATE 1 DROP: 20; 5 SOLUTION/ DROPS OPHTHALMIC at 08:40

## 2018-01-22 RX ADMIN — BRIMONIDINE TARTRATE 1 DROP: 1 SOLUTION/ DROPS OPHTHALMIC at 21:14

## 2018-01-22 RX ADMIN — ASPIRIN 81 MG CHEWABLE TABLET 81 MG: 81 TABLET CHEWABLE at 08:38

## 2018-01-22 RX ADMIN — PREDNISONE 60 MG: 10 TABLET ORAL at 08:38

## 2018-01-22 RX ADMIN — BIMATOPROST 1 DROP: 0.1 SOLUTION/ DROPS OPHTHALMIC at 21:14

## 2018-01-22 RX ADMIN — ATORVASTATIN CALCIUM 10 MG: 10 TABLET, FILM COATED ORAL at 08:38

## 2018-01-22 RX ADMIN — DOXYCYCLINE HYCLATE 100 MG: 100 TABLET, FILM COATED ORAL at 21:13

## 2018-01-22 RX ADMIN — LISINOPRIL 20 MG: 20 TABLET ORAL at 08:37

## 2018-01-22 RX ADMIN — IPRATROPIUM BROMIDE AND ALBUTEROL SULFATE 3 ML: .5; 3 SOLUTION RESPIRATORY (INHALATION) at 07:45

## 2018-01-22 RX ADMIN — IPRATROPIUM BROMIDE AND ALBUTEROL SULFATE 3 ML: .5; 3 SOLUTION RESPIRATORY (INHALATION) at 11:10

## 2018-01-22 RX ADMIN — DOXYCYCLINE HYCLATE 100 MG: 100 TABLET, FILM COATED ORAL at 08:37

## 2018-01-22 RX ADMIN — BRIMONIDINE TARTRATE 1 DROP: 1 SOLUTION/ DROPS OPHTHALMIC at 08:40

## 2018-01-22 RX ADMIN — IPRATROPIUM BROMIDE AND ALBUTEROL SULFATE 3 ML: .5; 3 SOLUTION RESPIRATORY (INHALATION) at 20:12

## 2018-01-22 RX ADMIN — IPRATROPIUM BROMIDE AND ALBUTEROL SULFATE 3 ML: .5; 3 SOLUTION RESPIRATORY (INHALATION) at 15:23

## 2018-01-22 RX ADMIN — BIMATOPROST 1 DROP: 0.1 SOLUTION/ DROPS OPHTHALMIC at 01:01

## 2018-01-22 ASSESSMENT — ACTIVITIES OF DAILY LIVING (ADL)
ADLS_ACUITY_SCORE: 9
ADLS_ACUITY_SCORE: 17
ADLS_ACUITY_SCORE: 9

## 2018-01-22 ASSESSMENT — ENCOUNTER SYMPTOMS
VOMITING: 0
FEVER: 0
SHORTNESS OF BREATH: 1

## 2018-01-22 NOTE — ED NOTES
Pt c/o sob and cough that started a couple weeks ago, but has worsened in the last 5 days. 88percent room air.  Pt immediately placed on oxygen and sats came up to 95 percent.  Lungs diminshed. Cough noted.  No fevers.  Pt did get back from a cruise on the 13th.  H/o copd.

## 2018-01-22 NOTE — PLAN OF CARE
Problem: Patient Care Overview  Goal: Plan of Care/Patient Progress Review  Outcome: No Change  Pt arrived to floor around 1055  Settled pt in room   Released orders  Brought food to pt  Will continue to monitor

## 2018-01-22 NOTE — CONSULTS
Care Transition Initial Assessment - RN    Reason For Consult: care coordination/care conference, discharge planning   Met with: Patient.    DATA   Active Problems:    COPD exacerbation (H)       Cognitive Status: awake, alert and oriented.  Primary Care Clinic Name: [facility]  Primary Care MD Name: Dr Brizuela  Contact information and PCP information verified: Yes    Lives With: alone       Insurance concerns: No Insurance issues identified    ASSESSMENT  Patient currently receives the following services:  none        Identified issues/concerns regarding health management: Pt is admitted with COPD exacerbation.  This is the first time she has had this issue.  I discussed COPD action care plan with pt.  She declines [employer] assurance for COPD.  She is agreeable to f/u with [facility].  She is debating about changing providers.  She declines need for resources.  Hand off will be given to Dr Brizuela's RN CTS at time of dc.       PLAN  Patient given options and choices for discharge yes .  Patient/family is agreeable to the plan?  Yes:   Patient anticipates discharging to home .        Patient anticipates needs for home equipment: No  Discharge Planner   Discharge Plans in progress: yes  Barriers to discharge plan: none  Plan/Disposition: Home     Care  (CTS) will continue to follow as needed.    Nissa PRITCHETT CTS [id number]

## 2018-01-22 NOTE — PROGRESS NOTES
"[facility]  Hospitalist Progress Note  Zak Crespo, DO 01/22/2018    Reason for Stay (Diagnosis): COPD exacerbation.         Assessment and Plan:      Summary of Stay: Berenice Krause is a 72 year old female admitted on 1/21/2018 with COPD exacerbation.  Problem List:   1. COPD exacerbation.  Continue prednisone, Brio elliptical, duo nebs, and doxycycline.  Albuterol as needed.  2. Acute hypoxic respiratory failure.  Supplemental oxygen as needed.  3. Hypertension.  Continue lisinopril.  4. Hyperlipidemia.  Continue Lipitor.  5. Hyperglycemia.  Likely steroid-induced.  Check hemoglobin A1c.  DVT Prophylaxis: Ambulate every shift  Code Status: Full Code  Discharge Dispo: Home.  Estimated Disch Date / # of Days until Disch: 1-2        Interval History (Subjective):      Coughing.  Short of breath at times.  No chest pain, fevers, chills, nausea, vomiting, or diarrhea.                  Physical Exam:      Last Vital Signs:  /79 (BP Location: Right arm)  Pulse 105  Temp 98.1  F (36.7  C) (Oral)  Resp 20  Ht 1.575 m (5' 2\")  Wt 36 kg (79 lb 6.4 oz)  SpO2 92%  BMI 14.52 kg/m2    Gen:  NAD, A&Ox3.  Eyes:  PERRL, sclera anicteric.  OP:  MMM, no lesions.  Neck:  Supple.  CV:  Regular, no murmurs.  Lung:  Wheeze b/l, normal effort.  Ab:  +BS, soft.  Skin:  Warm, dry to touch.  No rash.  Ext:  No pitting edema LE b/l.           Medications:      All current medications were reviewed with changes reflected in problem list.         Data:      All new lab and imaging data was reviewed.   Labs:    Recent Labs  Lab 01/22/18  0901      POTASSIUM 3.9   CHLORIDE 97   CO2 28   ANIONGAP 8   *   BUN 21   CR 0.58   GFRESTIMATED >90   GFRESTBLACK >90   RICHARD 8.5       Recent Labs  Lab 01/22/18  0901   WBC 3.3*   HGB 14.6   HCT 43.9   MCV 91         Imaging:   Recent Results (from the past 24 hour(s))   Chest XR,  PA & LAT    Narrative    CHEST TWO VIEWS  1/21/2018 9:40 PM     HISTORY: " Dyspnea.    COMPARISON: 12/17/2011.      Impression    IMPRESSION: Hyperinflated lungs. Masslike density laterally in the  left upper lobe 2.2 x 1.3 cm diameter, unchanged. Normal heart size  and pulmonary vascularity. Sternal wires. No pleural effusion.  Interval development of compression fractures of two mid to lower  thoracic vertebral bodies. Age is indeterminate.     KEERTHI MITTAL MD

## 2018-01-22 NOTE — ED PROVIDER NOTES
History     Chief Complaint:  Shortness of Breath      HPI   Berenice Krause is a 72 year old female who presents with difficulty breathing ×2 weeks.  Patient states that she has a history of COPD and has had progressive dyspnea over the last 2 weeks but has markedly worsened in the last 3-5 days.  Exertion worsens her dyspnea as does lying flat.  She has no associated chest pain.  She does have a cough but it is relatively unchanged from baseline.  She denies any fevers, peripheral edema, unilateral leg swelling.  She has no changes in her medications.  She does have a rescue inhaler but only used it once without significant change of symptoms.    Allergies:  NKDA     Medications:    The patient is currently on no regular medications.      Past Medical History:    The patient denies any significant past medical history.    Past Surgical History:    The patient does not have any pertinent past surgical history  Family / Social History:    No past pertinent family history.     Social History:  The patient denies tobacco or alcohol use.  Marital Status:   [2]      Review of Systems   Constitutional: Negative for fever.   Respiratory: Positive for shortness of breath.    Cardiovascular: Negative for chest pain and leg swelling.   Gastrointestinal: Negative for vomiting.   Skin: Negative for rash.   All other systems reviewed and are negative.      Physical Exam   First Vitals:  BP: (!) 190/92  Pulse: 92  Temp: 99.3  F (37.4  C)  Resp: 28  Weight: 37.2 kg (82 lb)  SpO2: (!) 88 %      Physical Exam    General:   Pleasant, age appropriate female who appears mildly dyspneic  HEENT:    Oropharynx is moist, without lesions or trismus.  Eyes:    Conjunctiva normal, PERRL  Neck:    Supple, no meningismus.     CV:     Regular rate and rhythm.      No murmurs, rubs or gallops.       No JVD or unilateral leg swelling.       2+ radial pulses bilateral.       No lower extremity edema.  PULM:    Limited expiratory wheezing as  air exchange is minimal.       No respiratory distress.      Prolonged expiratory phase.     Speaks in sentences.     No rales or rhonci.     No stridor.  ABD:    Soft, non-tender, non-distended.       No rebound, guarding or rigidity.  MSK:     No gross deformity to all four extremities.   LYMPH:   No cervical lymphadenopathy.  NEURO:   Alert; no tremor  Skin:    Warm, dry and intact.    Psych:    Mood is good and affect is appropriate.          Emergency Department Course   ECG:  Indication: shortness of breath  Time: 2110  Vent. Rate 97 bpm. TN interval 196. QRS duration 96. QT/QTc 368/467. P-R-T axis 76 -52 75. Normal sinus rhythm. Possible left atrial enlargement. Left anterior fascicular block. Left ventricular hypertrophy. Cannot rule out Anteroseptal infarct, age undetermined. Abnormal ECG.  Read time: 2116      Imaging:  Radiographic findings were communicated with the patient who voiced understanding of the findings.  XR Chest 2 views:   Hyperinflated lungs. Masslike density laterally in the  left upper lobe 2.2 x 1.3 cm diameter, unchanged. Normal heart size  and pulmonary vascularity. Sternal wires. No pleural effusion.  Interval development of compression fractures of two mid to lower  thoracic vertebral bodies. Age is indeterminate.  As per radiology.     Laboratory:  CBC: WBC: 6.3, HGB: 14.2, PLT: 278    BMP: Glucose 134, Calcium 8.1, Creatinine: 0.48, o/w WNL    Blood gas venous and oxyhgb: WNL    Influenza A/B antigen: Both negative    Interventions:  2115 Duoneb 3 mL nebulization   2148 Duoneb 3 mL nebulization   2146 Solu-Medrol 81.25 mg IV      Emergency Department Course:  Nursing notes and vitals reviewed.     2115 I performed an exam of the patient as documented above.     IV inserted. Medicine administered as documented above.     Blood drawn. This was sent to the lab for further testing, results above.    The patient was sent for a chest XR while in the emergency department, findings above.      EKG obtained in the ED, see results above.     I rechecked the patient and discussed the results of her workup thus far.     Findings and plan explained to the Patient and brother.    Consulted with hospital medicine team and will be admitted to a medical bed.        Impression & Plan      Medical Decision Makin-year-old female with history of COPD presented to the ED with increased difficulty breathing.  Clinical evaluation was consistent with acute COPD exacerbation.  She was given bronchodilators and steroids.  She has no complicating factors such as respiratory distress, pneumonia or influenza.  Patient is unfit to be discharged home and will require placement at a medical bed.  Patient safe for transfer the floor.    Diagnosis:    ICD-10-CM    1. COPD exacerbation (H) J44.1 Influenza A/B antigen     Procalcitonin     Procalcitonin     Basic metabolic panel     CBC with platelets     Lactic acid level STAT     Hemoglobin A1c     Hemoglobin A1c     CANCELED: Procalcitonin     CANCELED: Platelet count     CANCELED: Creatinine     CANCELED: Hemoglobin A1c       Disposition:  Admitted to medical bed      Tad Menon MD  2018   [facility] EMERGENCY DEPARTMENT       Tad Menon MD  [phone]

## 2018-01-22 NOTE — PROGRESS NOTES
"[facility] RCAT     Date: 1/22/18  Admission Dx: COPD exacerbation  Pulmonary History: COPD  Home Nebulizer/MDI Use: Albuterol prn  Home Oxygen: none  Acuity Level (RCAT flow sheet): Level 3  Aerosol Therapy initiated: Duoneb QID, Albuterol prn      Pulmonary Hygiene initiated: Cough and deep breathe      Volume Expansion initiated: Incentive spirometry      Current Oxygen Requirements: 2lpm  Current SpO2: 96%    Re-evaluation date: 1/25/18    Patient Education:      See \"RT Assessments\" flow sheet for patient assessment scoring and Acuity Level Details.             "

## 2018-01-22 NOTE — H&P
[facility]  Hospitalist Admission Note    Name: Berenice Krause      MRN: [id number]  YOB: 1945    Age: 72 year old  Date of admission: 1/21/2018  Primary care provider: Laureen Brizuela            Assessment and Plan:   Berenice Krause is a 72 year old female with a history of COPD who presents with COPD exacerbation    1. COPD exacerbation.  Prednisone 60 mg/day.  Duonebs QID.  Albuterol as needed.    2.  Acute hypoxic respiratory failure.  Supplemental oxygen as needed.    3.  Hyperlipidemia.  Lipitor.     4.  Hypertension.  Lisinopril.    Code status: Full code.  Admit to inpatient.  Prophylaxis: Lovenox.              Chief Complaint:   Shortness of breath.  Shortness of breath and cough.         History of Present Illness:   Berenice Krause a 72-year-old female who presents with shortness of breath.  She has been feeling short of breath for the past few weeks.  Has been significantly worse over the past 5 days.  It is becoming worse over the past 5 days has been progressively worsening during this time.  She has developed a nonproductive cough.  Feels weak with this.  Has never had symptoms this severe previously.  Has not been around anyone else sick that she knows of.  She has not had fevers or chills.  Has not had any chest pain.  Nothing at home was helping the symptoms.  Nebulizers in the emergency room have helped symptoms.  No other complaints.              Past Medical History:   COPD.  Hyperlipidemia.  Atrial septal defect repaired as a child.  Hypertension.       Past Surgical History:   History reviewed. No pertinent surgical history.          Social History:     Social History   Substance Use Topics     Smoking status: Former Smoker     Smokeless tobacco: Former User     Alcohol use No             Family History:   Mother with lung cancer.         Allergies:   No Known Allergies          Medications:     Prior to Admission medications    Medication Sig Last Dose Taking? Auth  Provider   albuterol (PROAIR HFA/PROVENTIL HFA/VENTOLIN HFA) 108 (90 BASE) MCG/ACT Inhaler Inhale 2 puffs into the lungs every 6 hours as needed for shortness of breath / dyspnea or wheezing 1/21/2018 Yes Reported, Patient   ASPIRIN ADULT LOW STRENGTH PO Take 81 mg by mouth daily 1/20/2018 Yes Reported, Patient   brimonidine (ALPHAGAN P) 0.1 % ophthalmic solution Place 1 drop into both eyes 2 times daily 1/21/2018 at PM Yes Reported, Patient   Atorvastatin Calcium (LIPITOR PO) Take 10 mg by mouth daily 1/20/2018 at PM Yes Reported, Patient   bimatoprost (LUMIGAN) 0.01 % SOLN Place 1 drop into both eyes At Bedtime 1/20/2018 at PM Yes Reported, Patient   dorzolamide-timolol (COSOPT) 2-0.5 % ophthalmic solution Place 1 drop into both eyes 2 times daily 1/21/2018 at PM Yes Reported, Patient   doxycycline (VIBRAMYCIN) 100 MG capsule Take 100 mg by mouth 2 times daily 1/21/2018 at AM Yes Reported, Patient   LISINOPRIL PO Take 20 mg by mouth daily 1/21/2018 at AM Yes Reported, Patient   fluticasone-salmeterol (ADVAIR) 250-50 MCG/DOSE diskus inhaler Inhale 1 puff into the lungs every 12 hours Not Started Yes Reported, Patient             Review of Systems:   A Comprehensive greater than 10 system review of systems was carried out.  Pertinent positives and negatives are noted above.  Otherwise negative for contributory information.           Physical Exam:   Blood pressure 135/81, pulse 92, temperature 99.3  F (37.4  C), temperature source Oral, resp. rate 28, weight 37.2 kg (82 lb), SpO2 96 %.  Wt Readings from Last 1 Encounters:   01/21/18 37.2 kg (82 lb)     Exam:  GENERAL: No apparent distress. Awake, alert, and fully oriented.  HEENT: Normocephalic, atraumatic. Extraocular movements intact.  CARDIOVASCULAR: Regular rate and rhythm without murmurs or rubs. No S3.  PULMONARY: Wheeze to auscultation bilaterally.  ABDOMINAL: Soft, non-tender, non-distended. Bowel sounds normoactive.   EXTREMITIES: No cyanosis or clubbing.  No appreciable edema.  NEUROLOGICAL: CN 2-12 grossly intact, no focal neurological deficits.  DERMATOLOGICAL: No rash, ulcer, bruising, nor jaundice.          Data:       Laboratory:    Recent Labs  Lab 01/21/18 2126   WBC 6.3   HGB 14.2   HCT 42.5   MCV 90          Recent Labs  Lab 01/21/18 2126      POTASSIUM 3.5   CHLORIDE 98   CO2 26   ANIONGAP 9   *   BUN 20   CR 0.48*   GFRESTIMATED >90   GFRESTBLACK >90   RICHARD 8.1*     No results for input(s): CULT in the last 168 hours.    Imaging:  Recent Results (from the past 24 hour(s))   Chest XR,  PA & LAT    Narrative    CHEST TWO VIEWS  1/21/2018 9:40 PM     HISTORY: Dyspnea.    COMPARISON: 12/17/2011.      Impression    IMPRESSION: Hyperinflated lungs. Masslike density laterally in the  left upper lobe 2.2 x 1.3 cm diameter, unchanged. Normal heart size  and pulmonary vascularity. Sternal wires. No pleural effusion.  Interval development of compression fractures of two mid to lower  thoracic vertebral bodies. Age is indeterminate.     KEERTHI MITTAL MD

## 2018-01-22 NOTE — PHARMACY-ADMISSION MEDICATION HISTORY
Admission medication history interview status for this patient is complete. See [facility] admission navigator for allergy information, prior to admission medications and immunization status.     Medication history interview source(s):Patient and Family  Medication history resources (including written lists, pill bottles, clinic record): written list and some inhaler boxes  Primary pharmacy: [facility] ([place])    Changes made to PTA medication list:  Added: all  Deleted: none  Changed: none    Actions taken by pharmacist (provider contacted, etc):None     Additional medication history information:None    Medication reconciliation/reorder completed by provider prior to medication history? No    Do you take OTC medications (eg tylenol, ibuprofen, fish oil, eye/ear drops, etc)? Y     Prior to Admission medications    Medication Sig Last Dose Taking? Auth Provider   albuterol (PROAIR HFA/PROVENTIL HFA/VENTOLIN HFA) 108 (90 BASE) MCG/ACT Inhaler Inhale 2 puffs into the lungs every 6 hours as needed for shortness of breath / dyspnea or wheezing 1/21/2018 Yes Reported, Patient   ASPIRIN ADULT LOW STRENGTH PO Take 81 mg by mouth daily 1/20/2018 Yes Reported, Patient   brimonidine (ALPHAGAN P) 0.1 % ophthalmic solution Place 1 drop into both eyes 2 times daily 1/21/2018 at PM Yes Reported, Patient   Atorvastatin Calcium (LIPITOR PO) Take 10 mg by mouth daily 1/20/2018 at PM Yes Reported, Patient   bimatoprost (LUMIGAN) 0.01 % SOLN Place 1 drop into both eyes At Bedtime 1/20/2018 at PM Yes Reported, Patient   dorzolamide-timolol (COSOPT) 2-0.5 % ophthalmic solution Place 1 drop into both eyes 2 times daily 1/21/2018 at PM Yes Reported, Patient   doxycycline (VIBRAMYCIN) 100 MG capsule Take 100 mg by mouth 2 times daily 1/21/2018 at AM Yes Reported, Patient   LISINOPRIL PO Take 20 mg by mouth daily 1/21/2018 at AM Yes Reported, Patient   fluticasone-salmeterol (ADVAIR) 250-50 MCG/DOSE diskus inhaler Inhale 1 puff  into the lungs every 12 hours Not Started Yes Reported, Patient

## 2018-01-22 NOTE — PLAN OF CARE
Problem: Patient Care Overview  Goal: Plan of Care/Patient Progress Review  Outcome: Improving  Patient slept well. Pt denies shortness of breath. On 1.5L of 02 sats in mid 90's. Up independently in room. Denies pain, nausea or vomiting. Tele: Sinus Rhythm. Patient reports feeling much better then when she came in. Expected discharge today.

## 2018-01-22 NOTE — PLAN OF CARE
Problem: Patient Care Overview  Goal: Plan of Care/Patient Progress Review  PT: Orders received. Per chart review, discussion with nursing, and discussion with pt, the pt is up indep and has no IPPT needs at this time. Will complete orders.

## 2018-01-22 NOTE — ED NOTES
[facility]  ED Nurse Handoff Report    Berenice Krause is a 72 year old female   ED Chief complaint: Shortness of Breath  . ED Diagnosis:   Final diagnoses:   COPD exacerbation (H)     Allergies: No Known Allergies    Code Status: Full Code  Activity level - Baseline/Home:  Independent. Activity Level - Current:   Independent. Lift room needed: No. Bariatric: No   Needed: No   Isolation: No. Infection: Not Applicable.     Vital Signs:   Vitals:    01/21/18 2116 01/21/18 2150 01/21/18 2152 01/21/18 2221   BP:  135/81     Pulse:       Resp:       Temp:       TempSrc:       SpO2: 96% 96% 95% 96%   Weight:           Cardiac Rhythm:  ,      Pain level:    Patient confused: No. Patient Falls Risk: Yes.   Elimination Status: has not voided in er   Patient Report - Initial Complaint: sob. Focused Assessment: Pt c/o sob and cough that started a couple weeks ago, but has worsened in the last 5 days. 88percent room air.  Pt immediately placed on oxygen and sats came up to 95 percent.  Lungs diminshed. Cough noted.  No fevers.  Pt did get back from a cruise on the 13th.  H/o copd.  21:29 Respiratory Respiratory - Respiratory WDL: cough; all Lung Fields: All Fields Throughout All Lung Fields: Posterior:; diminished       Tests Performed: labs, xray Abnormal Results:   Chest XR,  PA & LAT   Final Result   IMPRESSION: Hyperinflated lungs. Masslike density laterally in the   left upper lobe 2.2 x 1.3 cm diameter, unchanged. Normal heart size   and pulmonary vascularity. Sternal wires. No pleural effusion.   Interval development of compression fractures of two mid to lower   thoracic vertebral bodies. Age is indeterminate.       KEERTHI MITTAL MD        Labs Ordered and Resulted from Time of ED Arrival Up to the Time of Departure from the ED   BASIC METABOLIC PANEL - Abnormal; Notable for the following:        Result Value    Glucose 134 (*)     Creatinine 0.48 (*)     Calcium 8.1 (*)     All other components  within normal limits   CBC WITH PLATELETS   BLOOD GAS VENOUS AND OXYHGB   PERIPHERAL IV CATHETER   INFLUENZA A/B ANTIGEN      Treatments provided: duo neb. Solumedrol, oxygen - 2liters  Family Comments: Bill pro ,  [phone]  OBS brochure/video discussed/provided to patient:  N/A  ED Medications:   Medications   ipratropium - albuterol 0.5 mg/2.5 mg/3 mL (DUONEB) neb solution 3 mL (3 mLs Nebulization Given 1/21/18 2115)   ipratropium - albuterol 0.5 mg/2.5 mg/3 mL (DUONEB) neb solution 6 mL (3 mLs Nebulization Given 1/21/18 2148)   methylPREDNISolone sodium succinate (solu-MEDROL) injection 81.25 mg (81.25 mg Intravenous Given 1/21/18 2146)   ipratropium - albuterol 0.5 mg/2.5 mg/3 mL (DUONEB) neb solution 3 mL (3 mLs Nebulization Given 1/21/18 2221)     Drips infusing:  No  For the majority of the shift, the patient's behavior Green. Interventions performed were na.     Severe Sepsis OR Septic Shock Diagnosis Present: No      ED Nurse Name/Phone Number: Chelly Fonseca,   10:28 PM    RECEIVING UNIT ED HANDOFF REVIEW    Above ED Nurse Handoff Report was reviewed: Yes  Reviewed by: Jenn Harris on January 21, 2018 at 10:44 PM

## 2018-01-22 NOTE — PLAN OF CARE
Problem: Patient Care Overview  Goal: Plan of Care/Patient Progress Review  Outcome: Improving  Pt remains hospitalized for COPD Exacerbation, Pt continues on 1L O2 via NC sating in the low 90's, VSS - tachycardic.  Pt up independently, tolerating a regular diet, Pt reported BM this shift. can be forgetful at times.

## 2018-01-23 ENCOUNTER — APPOINTMENT (OUTPATIENT)
Dept: GENERAL RADIOLOGY | Facility: CLINIC | Age: 73
DRG: 190 | End: 2018-01-23
Attending: HOSPITALIST
Payer: MEDICARE

## 2018-01-23 LAB — LACTATE BLD-SCNC: 1.1 MMOL/L (ref 0.7–2)

## 2018-01-23 PROCEDURE — 25000125 ZZHC RX 250: Performed by: INTERNAL MEDICINE

## 2018-01-23 PROCEDURE — 40000275 ZZH STATISTIC RCP TIME EA 10 MIN

## 2018-01-23 PROCEDURE — 25000131 ZZH RX MED GY IP 250 OP 636 PS 637: Mod: GY | Performed by: INTERNAL MEDICINE

## 2018-01-23 PROCEDURE — 83605 ASSAY OF LACTIC ACID: CPT | Performed by: INTERNAL MEDICINE

## 2018-01-23 PROCEDURE — A9270 NON-COVERED ITEM OR SERVICE: HCPCS | Mod: GY | Performed by: INTERNAL MEDICINE

## 2018-01-23 PROCEDURE — 94640 AIRWAY INHALATION TREATMENT: CPT | Mod: 76

## 2018-01-23 PROCEDURE — 94640 AIRWAY INHALATION TREATMENT: CPT

## 2018-01-23 PROCEDURE — 36415 COLL VENOUS BLD VENIPUNCTURE: CPT | Performed by: INTERNAL MEDICINE

## 2018-01-23 PROCEDURE — 25000132 ZZH RX MED GY IP 250 OP 250 PS 637: Mod: GY | Performed by: INTERNAL MEDICINE

## 2018-01-23 PROCEDURE — 99232 SBSQ HOSP IP/OBS MODERATE 35: CPT | Performed by: INTERNAL MEDICINE

## 2018-01-23 PROCEDURE — 12000007 ZZH R&B INTERMEDIATE

## 2018-01-23 PROCEDURE — 71045 X-RAY EXAM CHEST 1 VIEW: CPT

## 2018-01-23 RX ORDER — LISINOPRIL 10 MG/1
10 TABLET ORAL DAILY
Status: DISCONTINUED | OUTPATIENT
Start: 2018-01-24 | End: 2018-01-26 | Stop reason: HOSPADM

## 2018-01-23 RX ADMIN — FLUTICASONE FUROATE AND VILANTEROL TRIFENATATE 1 PUFF: 200; 25 POWDER RESPIRATORY (INHALATION) at 07:48

## 2018-01-23 RX ADMIN — LISINOPRIL 20 MG: 20 TABLET ORAL at 08:23

## 2018-01-23 RX ADMIN — ASPIRIN 81 MG CHEWABLE TABLET 81 MG: 81 TABLET CHEWABLE at 08:23

## 2018-01-23 RX ADMIN — DOXYCYCLINE HYCLATE 100 MG: 100 TABLET, FILM COATED ORAL at 08:23

## 2018-01-23 RX ADMIN — IPRATROPIUM BROMIDE AND ALBUTEROL SULFATE 3 ML: .5; 3 SOLUTION RESPIRATORY (INHALATION) at 19:58

## 2018-01-23 RX ADMIN — DORZOLAMIDE HYDROCHLORIDE AND TIMOLOL MALEATE 1 DROP: 20; 5 SOLUTION/ DROPS OPHTHALMIC at 21:37

## 2018-01-23 RX ADMIN — BRIMONIDINE TARTRATE 1 DROP: 1 SOLUTION/ DROPS OPHTHALMIC at 21:37

## 2018-01-23 RX ADMIN — IPRATROPIUM BROMIDE AND ALBUTEROL SULFATE 3 ML: .5; 3 SOLUTION RESPIRATORY (INHALATION) at 07:46

## 2018-01-23 RX ADMIN — BRIMONIDINE TARTRATE 1 DROP: 1 SOLUTION/ DROPS OPHTHALMIC at 08:26

## 2018-01-23 RX ADMIN — BIMATOPROST 1 DROP: 0.1 SOLUTION/ DROPS OPHTHALMIC at 21:37

## 2018-01-23 RX ADMIN — DOXYCYCLINE HYCLATE 100 MG: 100 TABLET, FILM COATED ORAL at 21:36

## 2018-01-23 RX ADMIN — ALBUTEROL SULFATE 2.5 MG: 2.5 SOLUTION RESPIRATORY (INHALATION) at 06:03

## 2018-01-23 RX ADMIN — IPRATROPIUM BROMIDE AND ALBUTEROL SULFATE 3 ML: .5; 3 SOLUTION RESPIRATORY (INHALATION) at 12:52

## 2018-01-23 RX ADMIN — PREDNISONE 60 MG: 10 TABLET ORAL at 08:23

## 2018-01-23 RX ADMIN — IPRATROPIUM BROMIDE AND ALBUTEROL SULFATE 3 ML: .5; 3 SOLUTION RESPIRATORY (INHALATION) at 16:17

## 2018-01-23 RX ADMIN — ATORVASTATIN CALCIUM 10 MG: 10 TABLET, FILM COATED ORAL at 08:23

## 2018-01-23 RX ADMIN — DORZOLAMIDE HYDROCHLORIDE AND TIMOLOL MALEATE 1 DROP: 20; 5 SOLUTION/ DROPS OPHTHALMIC at 08:26

## 2018-01-23 ASSESSMENT — ACTIVITIES OF DAILY LIVING (ADL)
ADLS_ACUITY_SCORE: 9

## 2018-01-23 NOTE — PLAN OF CARE
Problem: Patient Care Overview  Goal: Plan of Care/Patient Progress Review  Outcome: No Change  Pt remains hospitalized for COPD Exacerbation, Pt continues on 1L O2 via NC sating in the low to mid 90's, VSS - tachycardic.  Pt up independently, tolerating a regular diet, Pt sometimes forgetful. Pt feeling more tired and worse than yesterday.  Disposition 1-2 more nights in Hospital

## 2018-01-23 NOTE — PROGRESS NOTES
"[facility]  Hospitalist Progress Note  Zak Crespo, DO 01/23/2018    Reason for Stay (Diagnosis): COPD exacerbation         Assessment and Plan:      Summary of Stay: Berenice Krause is a 72 year old female admitted on 1/21/2018 with COPD exacerbation.  Problem List:   1. COPD exacerbation.  Continue prednisone, Breo elliptical, duo nebs, and doxycycline.  Albuterol as needed.  2. Acute hypoxic respiratory failure.  Supplemental oxygen as needed.  3. Hypertension.  Decrease lisinopril to 10 mg/day.  4. Hyperlipidemia.  Continue Lipitor.  5. Hyperglycemia.  Steroid-induced.  Hemoglobin A1c 5.8.  DVT Prophylaxis: Ambulate every shift  Code Status: Full Code  Discharge Dispo: Home.  Estimated Disch Date / # of Days until Disch: 1-2        Interval History (Subjective):      Short of breath.  Coughing.  No CP, F/C, N/V, or diarrhea.                  Physical Exam:      Last Vital Signs:  /66 (BP Location: Right arm)  Pulse 92  Temp 97.3  F (36.3  C) (Oral)  Resp 22  Ht 1.575 m (5' 2\")  Wt 36 kg (79 lb 6.4 oz)  SpO2 94%  BMI 14.52 kg/m2    Gen:  NAD, A&Ox3.  Eyes:  PERRL, sclera anicteric.  OP:  MMM, no lesions.  Neck:  Supple.  CV:  Regular, no murmurs.  Lung:  Wheeze b/l, normal effort.  Ab:  +BS, soft.  Skin:  Warm, dry to touch.  No rash.  Ext:  No pitting edema LE b/l.           Medications:      All current medications were reviewed with changes reflected in problem list.         Data:      All new lab and imaging data was reviewed.   Labs:    Recent Labs  Lab 01/22/18  0901      POTASSIUM 3.9   CHLORIDE 97   CO2 28   ANIONGAP 8   *   BUN 21   CR 0.58   GFRESTIMATED >90   GFRESTBLACK >90   RICHARD 8.5       Recent Labs  Lab 01/22/18  0901   WBC 3.3*   HGB 14.6   HCT 43.9   MCV 91         Imaging:   Recent Results (from the past 24 hour(s))   XR Chest Port 1 View    Narrative    XR CHEST PORT 1 VW 1/23/2018 7:11 AM    COMPARISON: 1/21/2018    HISTORY: Worsening " hypoxia.      Impression    IMPRESSION: Emphysematous changes again seen in both lungs and left  apical calcification again seen and unchanged. Median sternotomy wires  appear intact. No new focal airspace disease. No pleural effusion or  pneumothorax.    MANFRED NEAL MD

## 2018-01-23 NOTE — PLAN OF CARE
Problem: Patient Care Overview  Goal: Plan of Care/Patient Progress Review  Outcome: No Change  Pt remains admitted for COPD ex  Pt reported no pain or nausea during shift  O2 0.5L  On tele, SR with frequent PACs, 1*AV block  On PO prednisone  Up independent  Regular diet  Will continue to monitor

## 2018-01-23 NOTE — PLAN OF CARE
Problem: Patient Care Overview  Goal: Plan of Care/Patient Progress Review  Orientation: A/O x4, calls appropriately  VS stable except fluctuating oxygen saturations in AM when pt woke and complained of SOB/air hunger-supplemental oxygen increased and now 2LPM and sats around 94% afebrile, denies pain   Tele:Sinus Rhythm / Sinus Tach with frequent PVCs   LS: dim and equal at start of shift and with change in effort noted the right side to be clear and left side with friction/plueral rub auscultated - MD paged and ordered CXR. PRN neb given and ineffective and unable to effectively use IS, very shallow breaths with fatigue noted, chest symmetrical, frequent, non-productive cough while awake  GI: + Flatus - BM. Denies N/V. BS active  : Adequate urine output.   Diet: Regular  PIV: Saline Locked  Activity: independent with mobility. Pt slept comfortably throughout shift.   Disposition: Expected discharge TBD - hopeful for DC in AM but undetermined due to change in respiratory status.

## 2018-01-24 LAB
CREAT SERPL-MCNC: 0.54 MG/DL (ref 0.52–1.04)
GFR SERPL CREATININE-BSD FRML MDRD: >90 ML/MIN/1.7M2
TSH SERPL DL<=0.005 MIU/L-ACNC: 0.78 MU/L (ref 0.4–4)

## 2018-01-24 PROCEDURE — 25000125 ZZHC RX 250: Performed by: INTERNAL MEDICINE

## 2018-01-24 PROCEDURE — 12000007 ZZH R&B INTERMEDIATE

## 2018-01-24 PROCEDURE — 25000132 ZZH RX MED GY IP 250 OP 250 PS 637: Mod: GY | Performed by: INTERNAL MEDICINE

## 2018-01-24 PROCEDURE — A9270 NON-COVERED ITEM OR SERVICE: HCPCS | Mod: GY | Performed by: INTERNAL MEDICINE

## 2018-01-24 PROCEDURE — 40000275 ZZH STATISTIC RCP TIME EA 10 MIN

## 2018-01-24 PROCEDURE — 36415 COLL VENOUS BLD VENIPUNCTURE: CPT | Performed by: INTERNAL MEDICINE

## 2018-01-24 PROCEDURE — 94640 AIRWAY INHALATION TREATMENT: CPT

## 2018-01-24 PROCEDURE — 84443 ASSAY THYROID STIM HORMONE: CPT | Performed by: INTERNAL MEDICINE

## 2018-01-24 PROCEDURE — 99232 SBSQ HOSP IP/OBS MODERATE 35: CPT | Performed by: INTERNAL MEDICINE

## 2018-01-24 PROCEDURE — 25000131 ZZH RX MED GY IP 250 OP 636 PS 637: Mod: GY | Performed by: INTERNAL MEDICINE

## 2018-01-24 PROCEDURE — 94640 AIRWAY INHALATION TREATMENT: CPT | Mod: 76

## 2018-01-24 PROCEDURE — 82565 ASSAY OF CREATININE: CPT | Performed by: INTERNAL MEDICINE

## 2018-01-24 RX ORDER — PREDNISONE 20 MG/1
40 TABLET ORAL DAILY
Status: DISCONTINUED | OUTPATIENT
Start: 2018-01-25 | End: 2018-01-26 | Stop reason: HOSPADM

## 2018-01-24 RX ADMIN — IPRATROPIUM BROMIDE AND ALBUTEROL SULFATE 3 ML: .5; 3 SOLUTION RESPIRATORY (INHALATION) at 15:11

## 2018-01-24 RX ADMIN — BRIMONIDINE TARTRATE 1 DROP: 1 SOLUTION/ DROPS OPHTHALMIC at 21:06

## 2018-01-24 RX ADMIN — DOXYCYCLINE HYCLATE 100 MG: 100 TABLET, FILM COATED ORAL at 21:06

## 2018-01-24 RX ADMIN — ATORVASTATIN CALCIUM 10 MG: 10 TABLET, FILM COATED ORAL at 08:02

## 2018-01-24 RX ADMIN — BIMATOPROST 1 DROP: 0.1 SOLUTION/ DROPS OPHTHALMIC at 21:06

## 2018-01-24 RX ADMIN — IPRATROPIUM BROMIDE AND ALBUTEROL SULFATE 3 ML: .5; 3 SOLUTION RESPIRATORY (INHALATION) at 07:10

## 2018-01-24 RX ADMIN — FLUTICASONE FUROATE AND VILANTEROL TRIFENATATE 1 PUFF: 200; 25 POWDER RESPIRATORY (INHALATION) at 07:10

## 2018-01-24 RX ADMIN — DORZOLAMIDE HYDROCHLORIDE AND TIMOLOL MALEATE 1 DROP: 20; 5 SOLUTION/ DROPS OPHTHALMIC at 09:04

## 2018-01-24 RX ADMIN — LISINOPRIL 10 MG: 10 TABLET ORAL at 08:02

## 2018-01-24 RX ADMIN — IPRATROPIUM BROMIDE AND ALBUTEROL SULFATE 3 ML: .5; 3 SOLUTION RESPIRATORY (INHALATION) at 19:12

## 2018-01-24 RX ADMIN — ASPIRIN 81 MG CHEWABLE TABLET 81 MG: 81 TABLET CHEWABLE at 08:02

## 2018-01-24 RX ADMIN — IPRATROPIUM BROMIDE AND ALBUTEROL SULFATE 3 ML: .5; 3 SOLUTION RESPIRATORY (INHALATION) at 11:14

## 2018-01-24 RX ADMIN — PREDNISONE 60 MG: 10 TABLET ORAL at 08:02

## 2018-01-24 RX ADMIN — DORZOLAMIDE HYDROCHLORIDE AND TIMOLOL MALEATE 1 DROP: 20; 5 SOLUTION/ DROPS OPHTHALMIC at 21:06

## 2018-01-24 RX ADMIN — DOXYCYCLINE HYCLATE 100 MG: 100 TABLET, FILM COATED ORAL at 08:02

## 2018-01-24 RX ADMIN — BRIMONIDINE TARTRATE 1 DROP: 1 SOLUTION/ DROPS OPHTHALMIC at 09:04

## 2018-01-24 ASSESSMENT — ACTIVITIES OF DAILY LIVING (ADL)
ADLS_ACUITY_SCORE: 11
ADLS_ACUITY_SCORE: 11
ADLS_ACUITY_SCORE: 9
ADLS_ACUITY_SCORE: 11

## 2018-01-24 NOTE — CONSULTS
"CLINICAL NUTRITION SERVICES  -  ASSESSMENT NOTE    Malnutrition: Severe     REASON FOR ASSESSMENT  Berenice Krause is a 72 year old female seen by Registered Dietitian for Provider Order - Nutrition Education - \"malnutrition\"    NUTRITION HISTORY  - Information obtained from Jan.  - Admitted for COPD exacerbation, pt struggles with SOB at baseline.   - gave minimal information about usual intakes, other than that she tries to do smaller/frequent meals because she cannot eat much at one time.   - Breakfast is usually cereal, and she eats a muffin for a snack mid-morning. When asked about lunch/dinner, pt stated that she hasn't been \"doing a good job lately\" of eating adequate meals.   - Chandler recent got back from a cruise, where she made it to all meals however reports she did not eat much. She was feeling unwell for the 5 days she was on vacation.   - describes at least a 2-3 week period prior to her vacation when she was not eating as well as normal, however suspect that pt eats small meals/snacks at baseline. <50-75% intakes for at least the past 3-4 weeks.   - NKFA, although pt does dislike many foods (especially protein foods). Does not eat yogurt, cottage cheese, much meat.   - Likes eggs (Can scramble eggs easily, makes garcia).   - Likes it best when she has someone bring her takeout from a restaurant, because it is easy for her to re-heat and use as leftovers.   - Lives next-door to ?brother and sister-in-law who help her with some grocery shopping. She does not like the foods they eat however so she does not eat meals with them.   - Pt mentioned multiple times that she is planning on hiring help for when she goes home (specifically mentioned grocery shopping).      CURRENT NUTRITION ORDERS  Diet Order:     Regular     Current Intake/Tolerance:  50-75% intakes recorded, small meals TID. Pt asked if she was able to order small snacks between meals - encouraged.       PHYSICAL FINDINGS  Observed  Muscle Wasting - " "moderate-severe at baseline, with some reported recent loss  Subcutaneous fat loss - mod-severe at baseline  Obtained from Chart/Interdisciplinary Team  None noted    ANTHROPOMETRICS  Height: 5' 2\"  Weight: 79 lbs 6.4 oz (36 kg)  Body mass index is 14.52 kg/(m^2).  Weight Status:  Underweight BMI <18.5  IBW: 50 kg  % IBW: 72%  Weight History: Weight review indicates a ~9# loss in <1 year (10.8%), 4# loss in the past 5 months (5%). Pt reports UBW of 90#.   Wt Readings from Last 10 Encounters:   01/21/18 36 kg (79 lb 6.4 oz)   Per \"care everywhere\"   8/21/2017 - 37.8 kg (83 lb 6 oz)  2/21/2017 - 39.9 kg (88 lb)   1/29/2016 - 44.7 kg (98 lb 8 oz)    LABS  Labs reviewed  Steroid-induced hyperglycemia, A1c 5.8%    MEDICATIONS  Medications reviewed    Dosing Weight 36 kg    ASSESSED NUTRITION NEEDS PER APPROVED PRACTICE GUIDELINES:  Estimated Energy Needs: 5869-1857 kcals (30-35 Kcal/Kg)  Justification: repletion and underweight  Estimated Protein Needs: 43-54 grams protein (1.2-1.5 g pro/Kg)  Justification: preservation of lean body mass  Estimated Fluid Needs: 0912-9042 mL (1 mL/Kcal)  Justification: maintenance    MALNUTRITION:  % Weight Loss:  Weight loss does not meet criteria for malnutrition - although pt chronically underweight  % Intake:  </= 75% for >/= 1 month (severe malnutrition)  Subcutaneous Fat Loss:  Orbital region moderate depletion and Upper arm region severe depletion - minimal stores at baseline.   Muscle Loss:  Temporal region mild depletion, Clavicle bone region severe depletion, Acromion bone region severe depletion, Scapular bone region severe depletion and Dorsal hand region severe depletion - minimal at baseline  Fluid Retention:  None noted    Malnutrition Diagnosis: Severe malnutrition  In Context of:  Acute illness or injury  Chronic illness or disease, ?component of Social/Environmental factor    NUTRITION DIAGNOSIS:  Malnutrition related to chronically inadequate oral intakes w/ some " component of SOB w/ eating r/t COPD as evidenced by recent decline intakes to at 75% or less of baseline, weight loss of up to 5% in the past 5 weeks, e/o minimal muscle and fat stores at baseline, coding for severe malnutrition, Chronically underweight BMI 14.5 kg/m^2.       NUTRITION INTERVENTIONS  Recommendations / Nutrition Prescription  Continue regular diet, 6 small meals - encourage pt to order snacks between meals      Implementation  Nutrition education: Provided education on small/frequent meals, protein foods, need for kcals/protein to maintain mass, increased caloric expenditure with COPD.   Collaboration and Referral of Nutrition care: Pt discussed during interdisciplinary rounds.       Nutrition Goals  Pt to tolerate at least 75% of meals TID + 1-2 snacks daily.       MONITORING AND EVALUATION:  Progress towards goals will be monitored and evaluated per protocol and Practice Guidelines      Shauna Salmeron RD, LD  3rd floor/ICU: [phone]  All other floors: [phone]  Weekend/holiday: [phone]  Office: [phone]

## 2018-01-24 NOTE — DISCHARGE INSTRUCTIONS
NUTRITION DISCHARGE INSTRUCTIONS  - recommend high protein oral supplement if appetite/intakes remain poor, or if further weight loss is observed. Recommend multivitamin supplement.

## 2018-01-24 NOTE — PLAN OF CARE
A&O x 4. Up with SBA. RA at rest, O2 at 2L with activity. Dyspnea on exertion. Denies pain. NSR with PAC/PVC's.   Plan to DC home tomorrow with assist  Nutrition and SW consult

## 2018-01-24 NOTE — PLAN OF CARE
Problem: Patient Care Overview  Goal: Plan of Care/Patient Progress Review  Outcome: No Change  Pt remains admitted for COPD ex  Pt reported no pain or nausea during shift  Some SOB reported with activity   O2 1L  On tele, ST  On PO prednisone  Up SBA  Regular diet  Will continue to monitor

## 2018-01-24 NOTE — PLAN OF CARE
Problem: Patient Care Overview  Goal: Plan of Care/Patient Progress Review  Outcome: Improving  A&Ox4, up Indep/SBA  LDA: PIV SL  Vitals: stable, 1L O2, PRETTY  Pain: c/o back from lying down, declined meds  Tele: SR   Voiding, not saving  Diet: regular, tolerating  Plan: DC home poss today.  Will continue to monitor.

## 2018-01-24 NOTE — PROGRESS NOTES
"[facility] [place]  Hospitalist Progress Note  Zak Crespo, DO 01/24/2018    Reason for Stay (Diagnosis): COPD exacerbation.         Assessment and Plan:      Summary of Stay: Berenice Krause is a 72 year old female admitted on 1/21/2018 with COPD exacerbation.  Problem List:   1. COPD exacerbation.  Decrease Prednisone to 40 mg/day for tomorrow's dose.  Continue Breo elliptical, duo nebs, and doxycycline.  Albuterol as needed.  2. Acute hypoxic respiratory failure.  Supplemental oxygen as needed.  3. Hypertension.  Continue lisinopril at 10 mg/day.  4. Hyperlipidemia.  Continue Lipitor.  5. Hyperglycemia.  Steroid-induced.  Hemoglobin A1c 5.8.  6. Occasional mild tachycardia.  Check TSH.  7. Deconditioning.  PT and OT consults.  8. Malnutrition.  RD consult.  DVT Prophylaxis: Ambulate every shift  Code Status: Full Code  Discharge Dispo: Home.  Estimated Disch Date / # of Days until Disch: 1-2        Interval History (Subjective):      Short of breath.  Feels weak.  Occasional cough.  No CP, F/C, N/V, or diarrhea.                  Physical Exam:      Last Vital Signs:  /68 (BP Location: Right arm)  Pulse 92  Temp 97.1  F (36.2  C) (Oral)  Resp 16  Ht 1.575 m (5' 2\")  Wt 36 kg (79 lb 6.4 oz)  SpO2 93%  BMI 14.52 kg/m2    Gen:  NAD, A&Ox3.  Eyes:  PERRL, sclera anicteric.  OP:  MMM, no lesions.  Neck:  Supple.  CV:  Regular, no murmurs.  Lung:  Wheeze b/l, normal effort.  Ab:  +BS, soft.  Skin:  Warm, dry to touch.  No rash.  Ext:  No pitting edema LE b/l.           Medications:      All current medications were reviewed with changes reflected in problem list.         Data:      All new lab and imaging data was reviewed.   Labs:    Recent Labs  Lab 01/24/18  0757 01/22/18  0901   NA  --  133   POTASSIUM  --  3.9   CHLORIDE  --  97   CO2  --  28   ANIONGAP  --  8   GLC  --  209*   BUN  --  21   CR 0.54 0.58   GFRESTIMATED >90 >90   GFRESTBLACK >90 >90   RICHARD  --  8.5       Recent Labs  Lab " 01/22/18  0901   WBC 3.3*   HGB 14.6   HCT 43.9   MCV 91         Imaging:   No results found for this or any previous visit (from the past 24 hour(s)).

## 2018-01-24 NOTE — PROGRESS NOTES
Pt lives in a 2800 sq foot multil level home.  To get to her bedroom or bathroom she has to do at least 6 steps.  She feels she can manage this level of activity.  She requests resources for house keeping and running errands for support.  Pt given [facility], [facility] and [facility].  We also discussed her medicare benefit for home care support in case she needs to utilize it down the road.  Pt appreciative of resources.  Nissa PRITCHETT CTS [phone]

## 2018-01-25 ENCOUNTER — APPOINTMENT (OUTPATIENT)
Dept: OCCUPATIONAL THERAPY | Facility: CLINIC | Age: 73
DRG: 190 | End: 2018-01-25
Attending: INTERNAL MEDICINE
Payer: MEDICARE

## 2018-01-25 ENCOUNTER — APPOINTMENT (OUTPATIENT)
Dept: PHYSICAL THERAPY | Facility: CLINIC | Age: 73
DRG: 190 | End: 2018-01-25
Attending: INTERNAL MEDICINE
Payer: MEDICARE

## 2018-01-25 LAB
ANION GAP SERPL CALCULATED.3IONS-SCNC: 6 MMOL/L (ref 3–14)
BUN SERPL-MCNC: 19 MG/DL (ref 7–30)
CALCIUM SERPL-MCNC: 8.4 MG/DL (ref 8.5–10.1)
CHLORIDE SERPL-SCNC: 100 MMOL/L (ref 94–109)
CO2 SERPL-SCNC: 32 MMOL/L (ref 20–32)
CREAT SERPL-MCNC: 0.54 MG/DL (ref 0.52–1.04)
GFR SERPL CREATININE-BSD FRML MDRD: >90 ML/MIN/1.7M2
GLUCOSE SERPL-MCNC: 120 MG/DL (ref 70–99)
POTASSIUM SERPL-SCNC: 3.4 MMOL/L (ref 3.4–5.3)
SODIUM SERPL-SCNC: 138 MMOL/L (ref 133–144)

## 2018-01-25 PROCEDURE — 40000193 ZZH STATISTIC PT WARD VISIT: Performed by: PHYSICAL THERAPIST

## 2018-01-25 PROCEDURE — 12000007 ZZH R&B INTERMEDIATE

## 2018-01-25 PROCEDURE — A9270 NON-COVERED ITEM OR SERVICE: HCPCS | Mod: GY | Performed by: INTERNAL MEDICINE

## 2018-01-25 PROCEDURE — 99232 SBSQ HOSP IP/OBS MODERATE 35: CPT | Performed by: INTERNAL MEDICINE

## 2018-01-25 PROCEDURE — 94640 AIRWAY INHALATION TREATMENT: CPT | Mod: 76

## 2018-01-25 PROCEDURE — 97535 SELF CARE MNGMENT TRAINING: CPT | Mod: GO | Performed by: STUDENT IN AN ORGANIZED HEALTH CARE EDUCATION/TRAINING PROGRAM

## 2018-01-25 PROCEDURE — 97162 PT EVAL MOD COMPLEX 30 MIN: CPT | Mod: GP | Performed by: PHYSICAL THERAPIST

## 2018-01-25 PROCEDURE — 40000275 ZZH STATISTIC RCP TIME EA 10 MIN

## 2018-01-25 PROCEDURE — 25000125 ZZHC RX 250: Performed by: INTERNAL MEDICINE

## 2018-01-25 PROCEDURE — 80048 BASIC METABOLIC PNL TOTAL CA: CPT | Performed by: INTERNAL MEDICINE

## 2018-01-25 PROCEDURE — 25000132 ZZH RX MED GY IP 250 OP 250 PS 637: Mod: GY | Performed by: INTERNAL MEDICINE

## 2018-01-25 PROCEDURE — 94640 AIRWAY INHALATION TREATMENT: CPT

## 2018-01-25 PROCEDURE — 97116 GAIT TRAINING THERAPY: CPT | Mod: GP | Performed by: PHYSICAL THERAPIST

## 2018-01-25 PROCEDURE — 97165 OT EVAL LOW COMPLEX 30 MIN: CPT | Mod: GO | Performed by: STUDENT IN AN ORGANIZED HEALTH CARE EDUCATION/TRAINING PROGRAM

## 2018-01-25 PROCEDURE — 97530 THERAPEUTIC ACTIVITIES: CPT | Mod: GP | Performed by: PHYSICAL THERAPIST

## 2018-01-25 PROCEDURE — 36415 COLL VENOUS BLD VENIPUNCTURE: CPT | Performed by: INTERNAL MEDICINE

## 2018-01-25 PROCEDURE — 40000133 ZZH STATISTIC OT WARD VISIT: Performed by: STUDENT IN AN ORGANIZED HEALTH CARE EDUCATION/TRAINING PROGRAM

## 2018-01-25 RX ADMIN — BRIMONIDINE TARTRATE 1 DROP: 1 SOLUTION/ DROPS OPHTHALMIC at 21:27

## 2018-01-25 RX ADMIN — IPRATROPIUM BROMIDE AND ALBUTEROL SULFATE 3 ML: .5; 3 SOLUTION RESPIRATORY (INHALATION) at 19:07

## 2018-01-25 RX ADMIN — IPRATROPIUM BROMIDE AND ALBUTEROL SULFATE 3 ML: .5; 3 SOLUTION RESPIRATORY (INHALATION) at 07:57

## 2018-01-25 RX ADMIN — DORZOLAMIDE HYDROCHLORIDE AND TIMOLOL MALEATE 1 DROP: 20; 5 SOLUTION/ DROPS OPHTHALMIC at 09:14

## 2018-01-25 RX ADMIN — IPRATROPIUM BROMIDE AND ALBUTEROL SULFATE 3 ML: .5; 3 SOLUTION RESPIRATORY (INHALATION) at 15:34

## 2018-01-25 RX ADMIN — ATORVASTATIN CALCIUM 10 MG: 10 TABLET, FILM COATED ORAL at 08:42

## 2018-01-25 RX ADMIN — DOXYCYCLINE HYCLATE 100 MG: 100 TABLET, FILM COATED ORAL at 21:30

## 2018-01-25 RX ADMIN — PREDNISONE 40 MG: 20 TABLET ORAL at 08:42

## 2018-01-25 RX ADMIN — BIMATOPROST 1 DROP: 0.1 SOLUTION/ DROPS OPHTHALMIC at 21:27

## 2018-01-25 RX ADMIN — IPRATROPIUM BROMIDE AND ALBUTEROL SULFATE 3 ML: .5; 3 SOLUTION RESPIRATORY (INHALATION) at 12:14

## 2018-01-25 RX ADMIN — BRIMONIDINE TARTRATE 1 DROP: 1 SOLUTION/ DROPS OPHTHALMIC at 08:44

## 2018-01-25 RX ADMIN — LISINOPRIL 10 MG: 10 TABLET ORAL at 08:42

## 2018-01-25 RX ADMIN — DOXYCYCLINE HYCLATE 100 MG: 100 TABLET, FILM COATED ORAL at 08:42

## 2018-01-25 RX ADMIN — DORZOLAMIDE HYDROCHLORIDE AND TIMOLOL MALEATE 1 DROP: 20; 5 SOLUTION/ DROPS OPHTHALMIC at 21:27

## 2018-01-25 RX ADMIN — ASPIRIN 81 MG CHEWABLE TABLET 81 MG: 81 TABLET CHEWABLE at 08:42

## 2018-01-25 RX ADMIN — FLUTICASONE FUROATE AND VILANTEROL TRIFENATATE 1 PUFF: 200; 25 POWDER RESPIRATORY (INHALATION) at 07:57

## 2018-01-25 ASSESSMENT — ACTIVITIES OF DAILY LIVING (ADL)
ADLS_ACUITY_SCORE: 11
PREVIOUS_RESPONSIBILITIES: MEAL PREP;HOUSEKEEPING;LAUNDRY;SHOPPING;MEDICATION MANAGEMENT;FINANCES;DRIVING

## 2018-01-25 NOTE — PLAN OF CARE
Problem: Patient Care Overview  Goal: Plan of Care/Patient Progress Review  OT: Evaluation completed. Pt and her supportive brother in room during session. Baseline pt lives alone in 4 story home, typically independent with all home management, drives daily for [employer] [facility]. Brother and sister-in-law live next door, pt states sister-in-law assists with housekeeping some times.     Discharge Planner OT   Patient plan for discharge: open to TCU  Current status: Pt in bed, declined to complete any out of bed activity at this time as fatigued from recently being out of bed with PT; pt required questions to be simplified regarding home set-up and prior level of activity; pt and brother both noted pt has had some forgetfulness; pt in agreement to complete SLUMs cognitive testing scored 19/30, suggestive of dementia level deficits including impaired STM (0/5), executive functioning including problem solving, sequencing and planning ahead; results educated to pt and brother, questions answered   Barriers to return to prior living situation: current cognitive status, weakness, many stairs  Recommendations for discharge: At this time TCU based on current fatigue, weakness, cognition  Rationale for recommendations: will continue IP OT at this time        Entered by: Park Currie 01/25/2018 1:59 PM

## 2018-01-25 NOTE — PLAN OF CARE
"Problem: Patient Care Overview  Goal: Plan of Care/Patient Progress Review  Outcome: No Change  Pt remains admitted for COPD ex  Pt reported no pain or nausea during shift  O2 1L with activity and sometimes at rest  PO prednisone  Anxiety reported when she \"feels like she can't take a breath\"  Up SBA per pt preference  Regular diet, moderate appetite  Will continue to monitor      "

## 2018-01-25 NOTE — PLAN OF CARE
A&O x 4. Up with SBA with walker and gait belt. Denies pain. SOB intermittently at rest and with activity. O2 0-1 L at rest and 2-3 L with activity. . NSR on tele. Encourage ambulating in halls. IV SL  Plan to DC to TCU when medically stable.

## 2018-01-25 NOTE — PROGRESS NOTES
01/25/18 0855   Quick Adds   Type of Visit Initial PT Evaluation   Living Environment   Lives With alone   Living Arrangements house  (multi level home)   Home Accessibility stairs to enter home;stairs within home   Number of Stairs to Enter Home 1   Number of Stairs Within Home 7  (multi level split; no bathroom on main level)   Transportation Available car;family or friend will provide  (patient drives)   Living Environment Comment multi level split home   Self-Care   Usual Activity Tolerance moderate   Current Activity Tolerance poor   Regular Exercise no   Equipment Currently Used at Home none   Activity/Exercise/Self-Care Comment patient normally independent with mobility without an assistive device and no O2   Functional Level Prior   Ambulation 0-->independent   Transferring 0-->independent   Toileting 0-->independent   Bathing 0-->independent   Dressing 0-->independent   Eating 0-->independent   Communication 0-->understands/communicates without difficulty   Swallowing 0-->swallows foods/liquids without difficulty   Cognition 1 - attention or memory deficits  (lately- 1 week)   Fall history within last six months no   Which of the above functional risks had a recent onset or change? ambulation  (activity tolerance)   Prior Functional Level Comment patient normally independent with mobility; limited by SOB   General Information   Onset of Illness/Injury or Date of Surgery - Date 01/21/18   Referring Physician Zak Crespo, DO   Patient/Family Goals Statement fearful of return home   Pertinent History of Current Problem (include personal factors and/or comorbidities that impact the POC) Berenice rKause is a 72 year old female admitted on 1/21/2018 with COPD exacerbation.and acute hypoxic respiratory failure requiring O2; see medical record for further information   Precautions/Limitations oxygen therapy device and L/min  (1L)   General Observations patient noted to be anxious and forgetful during session    Cognitive Status Examination   Orientation orientation to person, place and time   Level of Consciousness alert   Follows Commands and Answers Questions 75% of the time;100% of the time   Personal Safety and Judgment at risk behaviors demonstrated;impaired   Memory impaired   Cognitive Comment noted to be forgetful during conversation   Pain Assessment   Patient Currently in Pain No   Posture    Posture Kyphosis   Range of Motion (ROM)   ROM Comment WFL   Strength   Strength Comments significant functional weakness noted during mobility; buckling of LE's L > R   Bed Mobility   Bed Mobility Comments independent with HOB elevated   Transfer Skills   Transfer Comments sit<>stand with CGA/SBA initially; with fatigue and SOB patient needing CGA for safety secondary to unsteadiness   Gait   Gait Comments only able to tolerate gait distance of 60 feet with no device; on 1L O2; mod SOB and buckling of LE's; unable to tolerate stair trial today   Balance   Balance Comments impaired gait stability; worse with fatigue   General Therapy Interventions   Planned Therapy Interventions gait training;transfer training;strengthening;progressive activity/exercise   Clinical Impression   Criteria for Skilled Therapeutic Intervention yes, treatment indicated   PT Diagnosis impaired gait/transfers; weakness   Influenced by the following impairments weakness; decreased activity tolerance; SOB with minimal activity; impaired balance; impaired cognition   Functional limitations due to impairments impaired independence with functional mobility   Clinical Presentation Evolving/Changing   Clinical Presentation Rationale impaired cognition; lives alone; assist for mobility; lives in a multi level house; unable to access currently   Clinical Decision Making (Complexity) Moderate complexity   Therapy Frequency` daily   Predicted Duration of Therapy Intervention (days/wks) 4 days   Anticipated Equipment Needs at Discharge front wheeled walker  "  Anticipated Discharge Disposition Transitional Care Facility   Risk & Benefits of therapy have been explained Yes   Patient, Family & other staff in agreement with plan of care Yes   [facility]-PAC TM \"6 Clicks\"   2016, Trustees of [facility], under license to [employer].  All rights reserved.   6 Clicks Short Forms Basic Mobility Inpatient Short Form   [facility] AM-PAC  \"6 Clicks\" V.2 Basic Mobility Inpatient Short Form   1. Turning from your back to your side while in a flat bed without using bedrails? 4 - None   2. Moving from lying on your back to sitting on the side of a flat bed without using bedrails? 4 - None   3. Moving to and from a bed to a chair (including a wheelchair)? 3 - A Little   4. Standing up from a chair using your arms (e.g., wheelchair, or bedside chair)? 3 - A Little   5. To walk in hospital room? 3 - A Little   6. Climbing 3-5 steps with a railing? 2 - A Lot   Basic Mobility Raw Score (Score out of 24.Lower scores equate to lower levels of function) 19   Total Evaluation Time   Total Evaluation Time (Minutes) 15     "

## 2018-01-25 NOTE — PROGRESS NOTES
01/25/18 1116   Quick Adds   Type of Visit Initial Occupational Therapy Evaluation   Living Environment   Lives With alone   Living Arrangements house   Number of Stairs to Enter Home 1   Number of Stairs Within Home 7   Transportation Available car;family or friend will provide   Living Environment Comment pt lives alone in multi story home, has supportive brother adn sister-in-law next door   Self-Care   Usual Activity Tolerance moderate   Current Activity Tolerance poor   Regular Exercise no   Equipment Currently Used at Home none   Activity/Exercise/Self-Care Comment pt independent at baseline, no O2 needs, no AD/DME; manages her home with frequent check-ins from brother and sister-in-law who live next door   Functional Level Prior   Ambulation 0-->independent   Transferring 0-->independent   Toileting 0-->independent  (standard height, vanity nearby)   Bathing 0-->independent  (walk-in shower)   Dressing 0-->independent   Fall history within last six months no   Prior Functional Level Comment pt reports independence at baseline; has 3 bathrooms in 4 story home, all standard toilets, has walk-in shower and tub showers   General Information   Onset of Illness/Injury or Date of Surgery - Date 01/24/18   Referring Physician Zak DO   Patient/Family Goals Statement eventually return home   Additional Occupational Profile Info/Pertinent History of Current Problem Berenice Krause is a 72 year old female admitted on 1/21/2018 with COPD exacerbation.and acute hypoxic respiratory failure requiring O2; pt noticed onset while on a cruise, ended up staying in her room a lot and returned on 1/13, required wheelchair off cruise and in airport; Pt and brother report noticing cognitive changes related to memory for a short time now, unclear on timeline   Precautions/Limitations oxygen therapy device and L/min  (1 Lpm via NC during eval, baseline roomair)   Cognitive Status Examination   Orientation orientation to person,  place and time   Level of Consciousness alert   Able to Follow Commands mild impairment   Memory impaired  (STM (0/5))   Cognitive Comment pt with cognitive deficits, see daily doc for further details   Visual Perception   Visual Perception Wears glasses   Sensory Examination   Sensory Quick Adds No deficits were identified   Pain Assessment   Patient Currently in Pain No   Range of Motion (ROM)   ROM Quick Adds No deficits were identified   Strength   Manual Muscle Testing Quick Adds No deficits were identified   Mobility   Bed Mobility Comments not assessed, pt declined out of bed activity   Transfer Skills   Transfer Comments not assessed, pt declined out of bed activity; per PT pt moving with FWW   Instrumental Activities of Daily Living (IADL)   Previous Responsibilities meal prep;housekeeping;laundry;shopping;medication management;finances;driving   IADL Comments pt has hired yardwork & snow removal   Activities of Daily Living Analysis   Impairments Contributing to Impaired Activities of Daily Living balance impaired;cognition impaired;strength decreased   General Therapy Interventions   Planned Therapy Interventions ADL retraining;IADL retraining   Clinical Impression   Criteria for Skilled Therapeutic Interventions Met yes, treatment indicated   OT Diagnosis impaired ability to manage ADL/IADLs safely and independently   Influenced by the following impairments impaired cognition, weakness, fatigue, impaired balance   Assessment of Occupational Performance 3-5 Performance Deficits   Identified Performance Deficits impaired ability to manage dressing, bathing, toilet, standing self cares and home management tasks   Clinical Decision Making (Complexity) Low complexity   Therapy Frequency daily   Predicted Duration of Therapy Intervention (days/wks) 3 days   Anticipated Discharge Disposition Home with Home Therapy;Transitional Care Facility   Risks and Benefits of Treatment have been explained. Yes   Patient,  "Family & other staff in agreement with plan of care Yes   Clinical Impression Comments pt demonstrates ability to benefit from skilled OT to improve overall safety and independence   [facility] AM-PAC TM \"6 Clicks\"   2016, Trustees of [facility], under license to [employer].  All rights reserved.   6 Clicks Short Forms Daily Activity Inpatient Short Form   [facility] AM-PAC  \"6 Clicks\" Daily Activity Inpatient Short Form   1. Putting on and taking off regular lower body clothing? 3 - A Little   2. Bathing (including washing, rinsing, drying)? 2 - A Lot   3. Toileting, which includes using toilet, bedpan or urinal? 3 - A Little   4. Putting on and taking off regular upper body clothing? 4 - None   5. Taking care of personal grooming such as brushing teeth? 3 - A Little   6. Eating meals? 4 - None   Daily Activity Raw Score (Score out of 24.Lower scores equate to lower levels of function) 19   Total Evaluation Time   Total Evaluation Time (Minutes) 12     "

## 2018-01-25 NOTE — PROGRESS NOTES
"[facility]  Hospitalist Progress Note  Zak Crespo, DO 01/25/2018    Reason for Stay (Diagnosis): COPD exacerbation         Assessment and Plan:      Summary of Stay: Berenice Krause is a 72 year old female admitted on 1/21/2018 with COPD exacerbation.  Problem List:   1. COPD exacerbation.  Continue Breo elliptical, duo nebs, prednisone, and doxycycline.  Albuterol as needed.  2. Acute hypoxic respiratory failure.  Supplemental oxygen as needed.  3. Hypertension.  Continue lisinopril at 10 mg/day.  4. Hyperlipidemia.  Continue Lipitor.  5. Hyperglycemia.  Steroid-induced.  Hemoglobin A1c 5.8.  6. Occasional mild tachycardia.  TSH normal.  7. Deconditioning.  PT and OT consults.  8. Malnutrition.  RD consult.  DVT Prophylaxis: Ambulate every shift  Code Status: Full Code  Discharge Dispo: TCU vs. Home with home care.  Estimated Disch Date / # of Days until Disch: 1-2           Interval History (Subjective):      Short of breath.  Occasional cough.  Denies chest pain, fevers, chills, nausea, vomiting, or diarrhea.                  Physical Exam:      Last Vital Signs:  /72 (BP Location: Right arm)  Pulse 92  Temp 98.1  F (36.7  C) (Oral)  Resp 20  Ht 1.575 m (5' 2\")  Wt 36 kg (79 lb 6.4 oz)  SpO2 90%  BMI 14.52 kg/m2    Gen:  NAD, A&Ox3.  Eyes:  PERRL, sclera anicteric.  OP:  MMM, no lesions.  Neck:  Supple.  CV:  Regular, no murmurs.  Lung: Diminished breath sounds b/l, occasional wheeze bilaterally, normal effort.  Ab:  +BS, soft.  Skin:  Warm, dry to touch.  No rash.  Ext:  No pitting edema LE b/l.           Medications:      All current medications were reviewed with changes reflected in problem list.         Data:      All new lab and imaging data was reviewed.   Labs:    Recent Labs  Lab 01/25/18  0902      POTASSIUM 3.4   CHLORIDE 100   CO2 32   ANIONGAP 6   *   BUN 19   CR 0.54   GFRESTIMATED >90   GFRESTBLACK >90   RICHARD 8.4*       Recent Labs  Lab 01/22/18  0901 "   WBC 3.3*   HGB 14.6   HCT 43.9   MCV 91         Imaging:   No results found for this or any previous visit (from the past 24 hour(s)).

## 2018-01-25 NOTE — PLAN OF CARE
Problem: Breathing Pattern Ineffective (Adult)  Goal: Identify Related Risk Factors and Signs and Symptoms  Related risk factors and signs and symptoms are identified upon initiation of Human Response Clinical Practice Guideline (CPG).   Outcome: Improving  A&Ox4, up indep/SBA, PRETTY  LDA: PIV SL  Vitals: stable, 1L/NC for comfort  Pain: denies  Tele: SR   PT & SW following  Plan: DC home w/assist today

## 2018-01-25 NOTE — PROGRESS NOTES
CM: Met with pt and her brother. PT now feels she will need TCU for dc planning support rather than returning home alone. Pt has a lot of anxiety about her mobility and her new o2 needs. PT does lives alone and has a lot of stairs to navigate/ SWS spoke with pt and brother about resources CC provided at their visit this week. Pt will keep this info and consider the option of a life line  I/A:L PT will only consider a private room in TCU and aware of possible private cost  P: [facility], [facility] and [facility].     CM: PT has been accepted for Private room at [facility]. [facility] accepted but only shared. PT does NOT want shared

## 2018-01-25 NOTE — PLAN OF CARE
Problem: Patient Care Overview  Goal: Plan of Care/Patient Progress Review  PT: Order received; Initial evaluation completed and treatment initiated. Patient admitted with COPD exacerbation and acute hypoxic respiratory failure; At baseline patient reports living in a multi level home alone; no use of an assistive device and no recent falls. Reports being on a cruise prior to admit and being unable to eat and needing a wheelchair to get off ship secondary to weakness,.  Discharge Planner PT   Patient plan for discharge: fearful of returning home alone; open to TCU  Current status: Patient noted to be very forgetful during session; O2 sats at rest on room air decreased to 84% at rest; 92-94% at rest on 1L; patient anxious about return home alone; able to perform bed mobility independently; SBA initially for transfers but CGA for balance when SOB; some buckling of R LE noted with fatigue; only able to tolerate gait x 50 feet before having mod SOB, loss of balance and reports LE's feeling weak; Initially needing CGA and then min A when SOB; O2 sats decreased to 86% on 1L with activity.  Barriers to return to prior living situation: Lives alone; multiple stairs to access home environment; fearful of falling; currently forgetful; weakness; poor activity tolerance  Recommendations for discharge: TCU; if patient refuses this she would need 24 hour assist for all mobility and Home PT; likely will need O2 at discharge   Rationale for recommendations: weakness; poor activity tolerance; impaired cognition; impaired balance; impaired independence with functional mobility       Entered by: Maribell Neumann 01/25/2018 9:17 AM

## 2018-01-26 ENCOUNTER — APPOINTMENT (OUTPATIENT)
Dept: PHYSICAL THERAPY | Facility: CLINIC | Age: 73
DRG: 190 | End: 2018-01-26
Payer: MEDICARE

## 2018-01-26 VITALS
HEART RATE: 92 BPM | BODY MASS INDEX: 14.61 KG/M2 | SYSTOLIC BLOOD PRESSURE: 135 MMHG | DIASTOLIC BLOOD PRESSURE: 80 MMHG | WEIGHT: 79.4 LBS | HEIGHT: 62 IN | TEMPERATURE: 97.5 F | OXYGEN SATURATION: 94 % | RESPIRATION RATE: 18 BRPM

## 2018-01-26 PROCEDURE — A9270 NON-COVERED ITEM OR SERVICE: HCPCS | Mod: GY | Performed by: INTERNAL MEDICINE

## 2018-01-26 PROCEDURE — 94640 AIRWAY INHALATION TREATMENT: CPT | Mod: 76

## 2018-01-26 PROCEDURE — 97116 GAIT TRAINING THERAPY: CPT | Mod: GP | Performed by: PHYSICAL THERAPY ASSISTANT

## 2018-01-26 PROCEDURE — 40000193 ZZH STATISTIC PT WARD VISIT: Performed by: PHYSICAL THERAPY ASSISTANT

## 2018-01-26 PROCEDURE — 40000274 ZZH STATISTIC RCP CONSULT EA 30 MIN

## 2018-01-26 PROCEDURE — 97530 THERAPEUTIC ACTIVITIES: CPT | Mod: GP | Performed by: PHYSICAL THERAPY ASSISTANT

## 2018-01-26 PROCEDURE — 40000275 ZZH STATISTIC RCP TIME EA 10 MIN

## 2018-01-26 PROCEDURE — 99207 ZZC NON-BILLABLE SERV PER CHARTING: CPT | Performed by: INTERNAL MEDICINE

## 2018-01-26 PROCEDURE — 25000125 ZZHC RX 250: Performed by: INTERNAL MEDICINE

## 2018-01-26 PROCEDURE — 94640 AIRWAY INHALATION TREATMENT: CPT

## 2018-01-26 PROCEDURE — 25000132 ZZH RX MED GY IP 250 OP 250 PS 637: Mod: GY | Performed by: INTERNAL MEDICINE

## 2018-01-26 RX ORDER — DOXYCYCLINE 100 MG/1
100 CAPSULE ORAL 2 TIMES DAILY
Qty: 8 CAPSULE | Refills: 0 | DISCHARGE
Start: 2018-01-26 | End: 2018-01-30

## 2018-01-26 RX ORDER — IPRATROPIUM BROMIDE AND ALBUTEROL SULFATE 2.5; .5 MG/3ML; MG/3ML
3 SOLUTION RESPIRATORY (INHALATION) 4 TIMES DAILY
Qty: 360 ML | DISCHARGE
Start: 2018-01-26 | End: 2018-01-29

## 2018-01-26 RX ORDER — PREDNISONE 20 MG/1
20 TABLET ORAL DAILY
DISCHARGE
Start: 2018-01-27 | End: 2018-02-02

## 2018-01-26 RX ADMIN — IPRATROPIUM BROMIDE AND ALBUTEROL SULFATE 3 ML: .5; 3 SOLUTION RESPIRATORY (INHALATION) at 11:07

## 2018-01-26 RX ADMIN — ASPIRIN 81 MG CHEWABLE TABLET 81 MG: 81 TABLET CHEWABLE at 09:32

## 2018-01-26 RX ADMIN — DORZOLAMIDE HYDROCHLORIDE AND TIMOLOL MALEATE 1 DROP: 20; 5 SOLUTION/ DROPS OPHTHALMIC at 09:37

## 2018-01-26 RX ADMIN — BRIMONIDINE TARTRATE 1 DROP: 1 SOLUTION/ DROPS OPHTHALMIC at 09:35

## 2018-01-26 RX ADMIN — PREDNISONE 40 MG: 20 TABLET ORAL at 09:32

## 2018-01-26 RX ADMIN — LISINOPRIL 10 MG: 10 TABLET ORAL at 09:31

## 2018-01-26 RX ADMIN — DOXYCYCLINE HYCLATE 100 MG: 100 TABLET, FILM COATED ORAL at 09:31

## 2018-01-26 RX ADMIN — FLUTICASONE FUROATE AND VILANTEROL TRIFENATATE 1 PUFF: 200; 25 POWDER RESPIRATORY (INHALATION) at 07:10

## 2018-01-26 RX ADMIN — ATORVASTATIN CALCIUM 10 MG: 10 TABLET, FILM COATED ORAL at 09:32

## 2018-01-26 RX ADMIN — IPRATROPIUM BROMIDE AND ALBUTEROL SULFATE 3 ML: .5; 3 SOLUTION RESPIRATORY (INHALATION) at 07:09

## 2018-01-26 ASSESSMENT — ACTIVITIES OF DAILY LIVING (ADL)
ADLS_ACUITY_SCORE: 11

## 2018-01-26 NOTE — PROGRESS NOTES
Your information has been submitted on January 26th, 2018 at 10:04:34 AM [facility]. The confirmation number is [id number]

## 2018-01-26 NOTE — PLAN OF CARE
Problem: Patient Care Overview  Goal: Plan of Care/Patient Progress Review  Occupational Therapy Discharge Summary    Reason for therapy discharge:    Discharged to transitional care facility.    Progress towards therapy goal(s). See goals on Care Plan in [facility] electronic health record for goal details.  Goals not met.  Barriers to achieving goals:   discharge from facility.    Therapy recommendation(s):    Continued therapy is recommended.  Rationale/Recommendations:  to maximize ADLs and functional mobility for safe discharge home.     **This patient was not seen by writing OT, information for discharge summary taken from treating OT's notes.**

## 2018-01-26 NOTE — PROGRESS NOTES
SWS  Asked to speak with pt re: d/c plan.  Did so.  Pt very anxious about going to [facility] [address]  She had heard it was an apt building with no services.  Explained that it was a TCU with nursing, aides, PT, meals, NP, etc.  This seemed to settle her mind a bit.  She wants to go.  She requests transport and SWS did explain that this is a private pay service and she would get a bill for this.  She accepts this.  HE set-up for 1400.  SW offered to contact family but pt stated she would call them herself.  Notified nursing, chg nurse and [facility] [address] that pt will d/c at 1400.  P:  No further SW needs

## 2018-01-26 NOTE — PLAN OF CARE
Problem: Patient Care Overview  Goal: Plan of Care/Patient Progress Review  Outcome: No Change  [id number]: Pt resting comfortably. VSS on 1-2L O2. A&O. Denies pain. Walked in the halls on 1L and on room air, sats decreased to mid 80 s on both 1L of O2 and on room air after a few minutes of exertion. On doxycycline.  Transfers with SBA. Tele reads SR. Will continue to monitor.

## 2018-01-26 NOTE — DISCHARGE SUMMARY
Admit Date:     01/21/2018   Discharge Date:     01/26/2018      PRESENTING COMPLAINT:  Shortness of breath and cough.      DISCHARGE DIAGNOSES:   1.  Acute chronic obstructive pulmonary disease exacerbation.   2.  Suspected underlying bronchitis.   3.  Acute hypoxic respiratory failure, requiring supplemental oxygen.   4.  Underlying hypertension.   5.  Underlying hyperlipidemia.   6.  Steroid-induced hyperglycemia.   7.  Periodic sinus tachycardia.   8.  Malnutrition.   9.  Suspected osteoporosis with potential for restrictive lung disease in the setting of kyphosis.   10.  Deconditioning.      PROCEDURES DURING THIS HOSPITALIZATION:  Chest x-ray, showing emphysematous changes in both lungs.      HOSPITAL COURSE:  Ms. Krause is a 72-year-old female, who has evidence of COPD, who, at home, is on chronic inhalers, but rarely uses her rescue inhaler, who presented with shortness of breath and hypoxia in the setting of a cough.  Her chronic medical problems include hypertension, hyperlipidemia, hyperglycemia as well as chronic-appearing malnutrition in the setting of being chronically underweight.      She was hospitalized at this facility, initiated on a combination of steroids, DuoNebs, and doxycycline for suspected bronchitis, and she has had improvement in her symptoms.  She remains moderately deconditioned and is unable to be discharged home and so, will be discharged to a transitional care unit.  Her hypoxic respiratory failure is improving, but she continues to require periodic supplemental oxygen.   1.  Chronic obstructive pulmonary disease exacerbation.  Suspect it was brought on by bronchitis.  She is improving with the initiation of initially IV steroids, which have been transitioned to oral prednisone and she will complete a prednisone wean, as outlined in the discharge medication administration.  She has been on DuoNebs, which I will continue for 2 more days to complete 7 days of q.i.d. DuoNebs and then,  that can be used on a p.r.n. basis.  I suspect she requires formal PFT testing in the outpatient setting to determine how much of this is actually restrictive and how much of this is obstructive lung disease.   2.  Acute hypoxic respiratory failure secondary to suspected bronchitis with COPD, complicated by an exacerbation.  She continues to require periodic oxygen, but that is being weaned.   3.  Steroid-induced hyperglycemia with a hemoglobin A1c measured at this hospitalization, notable for being within normal limits at 5.8%.  She was monitored with an insulin sliding scale and did not require supplemental insulin.   4.  Hypertension with a home regimen of lisinopril 20 mg a day.  She was placed on 10 mg a day and blood pressures are starting to climb into the upper 130s.  So, I will resume her lisinopril at 20 mg a day at discharge with recommendations for followup with the nursing home physician in 7 days to reassess both blood pressure and basic metabolic panel.   5.  Underlying hyperlipidemia, maintained on typical atorvastatin dosing.   6.  Occasional, mild tachycardia, likely medication and acute medical illness-induced, resolved at discharge.  Of note, TSH was within normal limits.   7.  Malnutrition, severe, based on RD consultation.  This is a chronic issue for her and likely due to chronic medical problems.   8.  Strongly suspected osteoporosis with kyphosis.  Recommend evaluation in the outpatient setting if this has not already been completed.   9.  Deconditioning in the setting of acute illness and prolonged hospitalization, requiring TCU at discharge prior to returning home alone with a 4-level house.      DISCHARGE MEDICATIONS:  Are as follows:   NEW MEDICATIONS:   1.  Guaifenesin 10 mL every 4 hours p.r.n. cough.   2.  Ipratropium albuterol nebulized solution 4 times daily for 2 more days scheduled and then, can use p.r.n.   3.  Prednisone 20 mg p.o. daily for 3 days, then 10 mg p.o. daily for 3  days, then discontinue.   4.  Doxycycline 100 mg p.o. b.i.d. for 4 additional days.      CONTINUED MEDICATIONS:   1.  Albuterol 2 puffs into the lungs q. 6 hours p.r.n. shortness of breath.   2.  Aspirin 81 mg p.o. daily.    3.  Lumigan eyedrops 1 drop into both eyes at bedtime.   4.  Alphagan P ophthalmic solution 1 drop into both eyes 2 times daily.   5.  Cosopt 2-0.5% ophthalmic solution 1 drop into both eyes twice daily.   6.  Fluticasone salmeterol 1 puff into the lungs q. 12 hours.   7.  Atorvastatin 10 mg p.o. daily.   8.  Lisinopril 20 mg p.o. daily.      DISCHARGE INSTRUCTIONS:    1.  She is discharged to TCU for rehabilitation purposes.     2.  I have asked that they follow up with the TCU nurse practitioner or MD in 7 days for a recheck posthospitalization and to recheck basic metabolic panel and blood pressure after resumption of home lisinopril at 20 mg per day.   3.  On discharge from TCU, she plans to follow up with Dr. Blank of [facility] to establish care.  At that visit, she should be evaluated for osteoporosis if not already completed and to get set up for formal pulmonary function studies to evaluate baseline function.    4.  She is discharged on oxygen by nasal cannula at 1-2 L to keep sats greater than 92%.  Wean as able.      Note that 35 minutes was spent on this discharge of which greater than 50% of the time was spent in coordination of care.         JEAN-CLAUDE WANG MD             D: 2018   T: 2018   MT: [place]      Name:     SENA MCGOVERN   MRN:      [id number]        Account:        [id number]   :      1945           Admit Date:     2018                                  Discharge Date: 2018      Document: [id number]       cc: Cathleen Blank MD

## 2018-01-26 NOTE — PLAN OF CARE
Problem: Patient Care Overview  Goal: Plan of Care/Patient Progress Review  Physical Therapy Discharge Summary    Reason for therapy discharge:    Discharged to transitional care facility.    Progress towards therapy goal(s). See goals on Care Plan in [facility] electronic health record for goal details.  Goals not met.  Barriers to achieving goals:   supervision with ambulation and transfers.SOB with activity    Therapy recommendation(s):    Continued therapy is recommended.  Rationale/Recommendations:  .. Pt is below baseline for functional mobility and strength. Pt would benefit from continued skilled therapy to address these deficits.

## 2018-01-26 NOTE — PROGRESS NOTES
"[facility] RCAT     Date: 1/26/18  Admission Dx: COPD exacerbation  Pulmonary History: COPD  Home Nebulizer/MDI Use: Albuterol PRN  Home Oxygen: none  Acuity Level (RCAT flow sheet): Level 3  Aerosol Therapy initiated: Duoneb QID, Albuterol prn      Pulmonary Hygiene initiated: Cough and deep breathing      Volume Expansion initiated: Incentive spirometry      Current Oxygen Requirements: 1LPM  Current SpO2: 92%    Re-evaluation date: 1/29/18    Patient Education: Indications for nebulizers      See \"RT Assessments\" flow sheet for patient assessment scoring and Acuity Level Details.             "

## 2018-01-26 NOTE — PLAN OF CARE
Problem: Patient Care Overview  Goal: Plan of Care/Patient Progress Review  Discharge Planner PT   Patient plan for discharge:[facility] [address] today  Current status: 1 assist to S for mobility and tranfers gait with and without AD, does better with RW and less SOB with AD says to 90% after walking with O2 and 85% with no O2. total gait to 250 feet this date.  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  Planned discharge to TCU later today.       Entered by: Beryl Bains 01/26/2018 11:33 AM

## 2018-01-26 NOTE — PROGRESS NOTES
Patient hospitalized for COPD exacerbation and respiratory failure. Cleared for discharge to [facility] today per Dr. Angela. Discharge instructions, medications, and follow-ups reviewed with patient and brother in detail. Discharge medications were faxed to [facility]. Patient and brother verbalized understanding of discharge plan and need for rehab-based care. Belongings returned to patient at time of discharge. [facility] providing transport to [facility] @ 1400.  Ms. Krause sent w/ portable O2 @ 2L NC.

## 2018-01-27 NOTE — PROGRESS NOTES
Transition Communication Hand-off for Care Transitions to Next Level of Care Provider    Name: Berenice Krause  MRN #: [id number]  Primary Care Provider: Laureen Brizuela  Primary Care MD Name: Dr Brizuela  Primary Clinic: [facility] [address]  [facility] [address]  Primary Care Clinic Name: [facility]  Reason for Hospitalization:  COPD exacerbation (H) [J44.1]  Admit Date/Time: 1/21/2018  9:05 PM  Discharge Date: 1/26  Payor Source: Payor: BCBS / Plan: BCBS PLATINUM BLUE / Product Type: PPO /     Readmission Assessment Measure (PETRA) Risk Score/category: average           Reason for Communication Hand-off Referral: Admission diagnoses: COPD    Discharge Plan: TCU [facility]       Concern for non-adherence with plan of care:   Y/N n  Discharge Needs Assessment:  Needs       Most Recent Value    Anticipated Changes Related to Illness none    Equipment Currently Used at Home none    Transportation Available car, family or friend will provide    # of Referrals Placed by [facility] Community Resources    PAS Number [id number]          Already enrolled in Tele-monitoring program and name of program:  refused  Follow-up specialty is recommended: Yes    Follow-up plan:  No future appointments.    Any outstanding tests or procedures:    Procedures     Future Labs/Procedures    Oxygen - Nasal cannula     Comments:    1-2 Lpm by nasal cannula to keep O2 sats 92% or greater. Wean as able          Referrals     Future Labs/Procedures    Physical Therapy Adult Consult     Comments:    Evaluate and treat as clinically indicated.    Reason:  Deconditioning            Key Recommendations:  First COPD exacerbation.  Pt given COPD action care plan.  Pt discharge to TCU with oxygen needs. Could benefit from close following/follow up after discharge from TCU on COPD symptom management and possible new home oxygen needs.    Adelita Taylor    AVS/Discharge Summary is the source of truth; this is a helpful guide for improved  communication of patient story

## 2018-01-29 ENCOUNTER — NURSING HOME VISIT (OUTPATIENT)
Dept: GERIATRICS | Facility: CLINIC | Age: 73
End: 2018-01-29
Payer: COMMERCIAL

## 2018-01-29 VITALS
HEART RATE: 91 BPM | RESPIRATION RATE: 22 BRPM | BODY MASS INDEX: 14.91 KG/M2 | WEIGHT: 81 LBS | OXYGEN SATURATION: 96 % | DIASTOLIC BLOOD PRESSURE: 65 MMHG | HEIGHT: 62 IN | TEMPERATURE: 98.8 F | SYSTOLIC BLOOD PRESSURE: 103 MMHG

## 2018-01-29 DIAGNOSIS — J20.9 ACUTE BRONCHITIS, UNSPECIFIED ORGANISM: ICD-10-CM

## 2018-01-29 DIAGNOSIS — I10 ESSENTIAL HYPERTENSION: ICD-10-CM

## 2018-01-29 DIAGNOSIS — R53.81 PHYSICAL DECONDITIONING: ICD-10-CM

## 2018-01-29 DIAGNOSIS — J44.1 COPD EXACERBATION (H): Primary | ICD-10-CM

## 2018-01-29 DIAGNOSIS — E43 SEVERE PROTEIN-CALORIE MALNUTRITION (H): ICD-10-CM

## 2018-01-29 DIAGNOSIS — R73.9 HYPERGLYCEMIA: ICD-10-CM

## 2018-01-29 DIAGNOSIS — Z71.89 ACP (ADVANCE CARE PLANNING): Chronic | ICD-10-CM

## 2018-01-29 PROCEDURE — 99310 SBSQ NF CARE HIGH MDM 45: CPT | Performed by: NURSE PRACTITIONER

## 2018-01-29 RX ORDER — IPRATROPIUM BROMIDE AND ALBUTEROL SULFATE 2.5; .5 MG/3ML; MG/3ML
1 SOLUTION RESPIRATORY (INHALATION) 4 TIMES DAILY PRN
COMMUNITY
End: 2018-02-02

## 2018-01-29 NOTE — PROGRESS NOTES
[facility]  PRIMARY CARE PROVIDER AND CLINIC:  [facility] [address] / [facility] *  Chief Complaint   Patient presents with     Hospital F/U       HPI:    Berenice Krause is a 72 year old  (1945),admitted to the [place] [facility] TCU from [facility].  Hospital stay 01/21/2018 through 01/26/2018.  Admitted to this facility for  rehab, medical management and nursing care.  HPI information obtained from: facility chart records, facility staff, patient report and [facility] chart review.    She has a history of COPD and was hospitalized with worsening cough and shortness of breath. She ws hypoxic in the ED. CXR showed emphysematous changes in both lungs. She was treated with steroids, duonebs and doxycycline for suspected bronchitis. Symptoms improved. Remains on prn O2.   She had steroid induced hyperglycemia that did not require insulin. HgbA1c 5.8. Also had mild tachycardia at times, likely related to medication and acute illness. Dietician consulted for severe malnutrition and recommended supplements.     Current issues are:         COPD exacerbation (H)  Acute bronchitis, unspecified organism-reports feeling better. Easily fatigued. Reports shortness of breath with activity and dry cough improving. Using O2 prn. Afebrile. Previously on rescue inhaler only and rarely needed to use it.   Severe protein-calorie malnutrition (H)-reports good appetite. States she has always been thin. Denies trouble chewing or swallowing. Denies GI symptoms.   Essential hypertension-BPs: 103/65, 144/84, 109/74   HR: 74-93  Hyperglycemia  Physical deconditioning-ambulating with walker and stand by assist. Requires stand by to min assist with cares.     CODE STATUS/ADVANCE DIRECTIVES DISCUSSION:   CPR/Full code   Patient's living condition: lives alone    ALLERGIES:Review of patient's allergies indicates no known allergies.  PAST MEDICAL HISTORY:  has a  past medical history of COPD (chronic obstructive pulmonary disease) (H); Glaucoma; HTN (hypertension); and Malnutrition (H).  PAST SURGICAL HISTORY:  has no past surgical history on file.  FAMILY HISTORY: family history is not on file.  SOCIAL HISTORY:  reports that she has quit smoking. She has quit using smokeless tobacco. She reports that she does not drink alcohol or use illicit drugs.    Post Discharge Medication Reconciliation Status: discharge medications reconciled, continue medications without change.  Current Outpatient Prescriptions   Medication Sig Dispense Refill     ipratropium - albuterol 0.5 mg/2.5 mg/3 mL (DUONEB) 0.5-2.5 (3) MG/3ML neb solution Take 1 vial by nebulization 4 times daily as needed for shortness of breath / dyspnea or wheezing       predniSONE (DELTASONE) 20 MG tablet Take 1 tablet (20 mg) by mouth daily 20 mg po qd x 3 days then 10 mg po qd x 3 days then d/c       guaiFENesin (ROBITUSSIN) 20 mg/mL SOLN solution Take 10 mLs by mouth every 4 hours as needed for cough 1200 mL 0     albuterol (PROAIR HFA/PROVENTIL HFA/VENTOLIN HFA) 108 (90 BASE) MCG/ACT Inhaler Inhale 2 puffs into the lungs every 6 hours as needed for shortness of breath / dyspnea or wheezing       ASPIRIN ADULT LOW STRENGTH PO Take 81 mg by mouth daily       brimonidine (ALPHAGAN P) 0.1 % ophthalmic solution Place 1 drop into both eyes 2 times daily       Atorvastatin Calcium (LIPITOR PO) Take 10 mg by mouth daily       bimatoprost (LUMIGAN) 0.01 % SOLN Place 1 drop into both eyes At Bedtime       dorzolamide-timolol (COSOPT) 2-0.5 % ophthalmic solution Place 1 drop into both eyes 2 times daily       LISINOPRIL PO Take 20 mg by mouth daily       fluticasone-salmeterol (ADVAIR) 250-50 MCG/DOSE diskus inhaler Inhale 1 puff into the lungs every 12 hours         ROS:  10 point ROS of systems including Constitutional, Eyes, Respiratory, Cardiovascular, Gastroenterology, Genitourinary, Integumentary, Muscularskeletal,  "Psychiatric were all negative except for pertinent positives noted in my HPI.    Exam:  /65  Pulse 91  Temp 98.8  F (37.1  C)  Resp 22  Ht 5' 2\" (1.575 m)  Wt 81 lb (36.7 kg)  SpO2 96%  BMI 14.82 kg/m2  GENERAL APPEARANCE:  Alert, in no distress, thin  ENT:  Mouth and posterior oropharynx normal, moist mucous membranes, normal hearing acuity  EYES:  EOM normal, conjunctiva and lids normal, PERRL  NECK:  No adenopathy,masses or thyromegaly  RESP:  respiratory effort and palpation of chest normal, no respiratory distress, diminished breath sounds bilaterally, no wheezes or crackles  CV:  Palpation and auscultation of heart done , regular rate and rhythm, no murmur,  no edema, +2 pedal pulses  ABDOMEN:  normal bowel sounds, soft, nontender, no hepatosplenomegaly or other masses  M/S:   gait steady with walker. DIANA with good strength. No joint inflammation  SKIN:  no rashes or open areas  PSYCH:  oriented X 3, normal insight, judgement and memory    Lab/Diagnostic data:  Last Basic Metabolic Panel:  Lab Results   Component Value Date     01/25/2018      Lab Results   Component Value Date    POTASSIUM 3.4 01/25/2018     Lab Results   Component Value Date    CHLORIDE 100 01/25/2018     Lab Results   Component Value Date    RICHARD 8.4 01/25/2018     Lab Results   Component Value Date    CO2 32 01/25/2018     Lab Results   Component Value Date    BUN 19 01/25/2018     Lab Results   Component Value Date    CR 0.54 01/25/2018     Lab Results   Component Value Date     01/25/2018     Lab Results   Component Value Date    WBC 3.3 01/22/2018     Lab Results   Component Value Date    RBC 4.84 01/22/2018     Lab Results   Component Value Date    HGB 14.6 01/22/2018     Lab Results   Component Value Date    HCT 43.9 01/22/2018     Lab Results   Component Value Date    MCV 91 01/22/2018     Lab Results   Component Value Date    MCH 30.2 01/22/2018     Lab Results   Component Value Date    MCHC 33.3 01/22/2018 "     Lab Results   Component Value Date    RDW 13.2 01/22/2018     Lab Results   Component Value Date     01/22/2018     ASSESSMENT / PLAN:  (J44.1) COPD exacerbation (H)  (primary encounter diagnosis)  (J20.9) Acute bronchitis, unspecified organism  Comment: respiratory status improving  Plan: continue doxycycline through 1/30/2018. Continue prednisone taper, duoneb, Advair, robitussin. Wean O2 as tolerated to maintain sats >90%. CBC, BMP.     (E43.0) Severe protein-calorie malnutrition (H)  Comment: long standing. Good oral intake since tcu admission  Plan: nutritional supplements per dietician.     (I10) Essential hypertension  Comment: controlled  Plan: continue lisinopril. Monitor VS. BMP    (R73.9) Hyperglycemia  Comment: steroid induced, improved  Plan: monitor blood sugar prn    (Z71.89) ACP (advance care planning)  Comment: she confirms Full Code. Does not have a health care directive, brother is emergency contact.   Plan: POLST completed.     (R53.81) Physical deconditioning  Comment: due to acute illness and hospitalization  Plan: PHYSICAL THERAPY/OT. Goal is to return home with services.         Total time spent with patient visit at the skilled nursing facility was 37 mins including patient visit and review of past records. Greater than 50% of total time spent with counseling and coordinating care due to complexity of care    Electronically signed by:  ULI Salmeron CNP

## 2018-01-30 ENCOUNTER — DOCUMENTATION ONLY (OUTPATIENT)
Dept: OTHER | Facility: CLINIC | Age: 73
End: 2018-01-30

## 2018-01-30 ENCOUNTER — TRANSFERRED RECORDS (OUTPATIENT)
Dept: HEALTH INFORMATION MANAGEMENT | Facility: CLINIC | Age: 73
End: 2018-01-30

## 2018-01-30 PROBLEM — Z71.89 ACP (ADVANCE CARE PLANNING): Chronic | Status: ACTIVE | Noted: 2018-01-30

## 2018-01-30 LAB
ANION GAP SERPL CALCULATED.3IONS-SCNC: 5 MMOL/L (ref 3–14)
BUN SERPL-MCNC: 19 MG/DL (ref 7–30)
CALCIUM SERPL-MCNC: 8.5 MG/DL (ref 8.5–10.1)
CHLORIDE SERPLBLD-SCNC: 100 MMOL/L (ref 94–109)
CO2 SERPL-SCNC: 32 MMOL/L (ref 20–32)
CREAT SERPL-MCNC: 0.57 MG/DL (ref 0.52–1.04)
ERYTHROCYTE [DISTWIDTH] IN BLOOD BY AUTOMATED COUNT: 13.5 % (ref 10–15)
GFR SERPL CREATININE-BSD FRML MDRD: >90 ML/MIN/1.73M2
GLUCOSE SERPL-MCNC: 67 MG/DL (ref 70–99)
HCT VFR BLD AUTO: 42.5 % (ref 35–47)
HEMOGLOBIN: 14.1 G/DL (ref 11.7–15.7)
MCH RBC QN AUTO: 30.3 PG (ref 26.5–33)
MCHC RBC AUTO-ENTMCNC: 33.2 G/DL (ref 31.5–36.5)
MCV RBC AUTO: 91 FL (ref 78–100)
PLATELET # BLD AUTO: 447 10^9/L (ref 150–450)
POTASSIUM SERPL-SCNC: 3.6 MMOL/L (ref 3.4–5.3)
RBC # BLD AUTO: 4.65 10^12/L (ref 3.8–5.2)
SODIUM SERPL-SCNC: 137 MMOL/L (ref 133–144)
WBC # BLD AUTO: 12.7 10^9/L (ref 4–11)

## 2018-01-31 PROBLEM — J20.9 ACUTE BRONCHITIS: Status: ACTIVE | Noted: 2018-01-31

## 2018-01-31 PROBLEM — R53.81 PHYSICAL DECONDITIONING: Status: ACTIVE | Noted: 2018-01-31

## 2018-01-31 PROBLEM — R73.9 HYPERGLYCEMIA: Status: ACTIVE | Noted: 2018-01-31

## 2018-02-02 ENCOUNTER — DISCHARGE SUMMARY NURSING HOME (OUTPATIENT)
Dept: GERIATRICS | Facility: CLINIC | Age: 73
End: 2018-02-02
Payer: COMMERCIAL

## 2018-02-02 VITALS
OXYGEN SATURATION: 97 % | DIASTOLIC BLOOD PRESSURE: 69 MMHG | SYSTOLIC BLOOD PRESSURE: 125 MMHG | HEART RATE: 81 BPM | RESPIRATION RATE: 18 BRPM | WEIGHT: 80.8 LBS | BODY MASS INDEX: 14.78 KG/M2 | TEMPERATURE: 97.7 F

## 2018-02-02 DIAGNOSIS — J20.9 ACUTE BRONCHITIS, UNSPECIFIED ORGANISM: ICD-10-CM

## 2018-02-02 DIAGNOSIS — I10 ESSENTIAL HYPERTENSION: ICD-10-CM

## 2018-02-02 DIAGNOSIS — R53.81 PHYSICAL DECONDITIONING: ICD-10-CM

## 2018-02-02 DIAGNOSIS — J44.1 COPD EXACERBATION (H): Primary | ICD-10-CM

## 2018-02-02 DIAGNOSIS — R73.9 HYPERGLYCEMIA: ICD-10-CM

## 2018-02-02 DIAGNOSIS — E43 SEVERE PROTEIN-CALORIE MALNUTRITION (H): ICD-10-CM

## 2018-02-02 PROCEDURE — 99316 NF DSCHRG MGMT 30 MIN+: CPT | Performed by: NURSE PRACTITIONER

## 2018-02-02 NOTE — PROGRESS NOTES
[facility] DISCHARGE SUMMARY    PATIENT'S NAME: Berenice Krause  YOB: 1945  MEDICAL RECORD NUMBER:  [id number]    PRIMARY CARE PROVIDER AND CLINIC RESPONSIBLE AFTER TRANSFER: [facility] [address] [facility] / [place]    CODE STATUS/ADVANCE DIRECTIVES DISCUSSION:   CPR/Full code      No Known Allergies    TRANSFERRING PROVIDERS: ULI Salmeron CNP, Christopher Gaitan MD  DATE OF SNF ADMISSION:  January / 26 / 2018  DATE OF SNF (anticipated) DISCHARGE: February / 06 / 2018  DISCHARGE DISPOSITION: Non-FM[facility] Provider   Nursing Facility: [facility] stay 01/21/2018 to 01/26/2018.     Condition on Discharge:  Improving.  Cognitive Scores: CPT 4.7/5.6 and MOCA 21/30    Equipment: no aids    DISCHARGE DIAGNOSIS:   1. COPD exacerbation (H)    2. Acute bronchitis, unspecified organism    3. Severe protein-calorie malnutrition (H)    4. Essential hypertension    5. Hyperglycemia    6. Physical deconditioning        HPI Nursing Facility Course:  HPI information obtained from: facility chart records, facility staff, patient report and [facility] chart review.She came to this facility 1/26/2018 for short term rehab and medical management following hospitalization for COPD exacerbation and acute bronchitis. She was treated with steroids, nebs and doxycycline. Advair was started while inpatient. Previously on rescue inhaler only, which she rarely used.   She has worked with PHYSICAL THERAPY and OT with good results. Ambulating 100 ft without a device. Requires stand by assist with bathing, otherwise independent with ADLs.   ASSESSMENT / PLAN:  (J44.1) COPD exacerbation (H)  (primary encounter diagnosis)  (J20.9) Acute bronchitis, unspecified organism  Comment: respiratory status improved. Weaned off O2 and nebs. Feeling well.   Plan: discharge home 2/6/2018. She lives alone and has assistance from her brother and  his wife who live next door. Continue Advair and prn albuterol. Home services and follow up as below.     (E43) Severe protein-calorie malnutrition (H)  Comment: long standing. Good oral intake while on tcu.   Plan: continue nutritional supplements.     (I10) Essential hypertension  Comment: controlled with BPs:  125/69, 160/89, 122/82  HR: 79-86  Plan: continue lisinopril.     (R73.9) Hyperglycemia  Comment: steroid induced. Resolved. HgbA1c 5.8  Plan: follow up labs per PCP    (R53.81) Physical deconditioning  Comment: progress in therapies as above  Plan: home therapies for ongoing gait training, endurance and ADL safety.        PAST MEDICAL HISTORY:  has a past medical history of COPD (chronic obstructive pulmonary disease) (H); Glaucoma; HTN (hypertension); and Malnutrition (H).    DISCHARGE MEDICATIONS:  Current Outpatient Prescriptions   Medication Sig Dispense Refill     guaiFENesin (ROBITUSSIN) 20 mg/mL SOLN solution Take 10 mLs by mouth every 4 hours as needed for cough 1200 mL 0     albuterol (PROAIR HFA/PROVENTIL HFA/VENTOLIN HFA) 108 (90 BASE) MCG/ACT Inhaler Inhale 2 puffs into the lungs every 6 hours as needed for shortness of breath / dyspnea or wheezing       ASPIRIN ADULT LOW STRENGTH PO Take 81 mg by mouth daily       brimonidine (ALPHAGAN P) 0.1 % ophthalmic solution Place 1 drop into both eyes 2 times daily       Atorvastatin Calcium (LIPITOR PO) Take 10 mg by mouth daily       bimatoprost (LUMIGAN) 0.01 % SOLN Place 1 drop into both eyes At Bedtime       dorzolamide-timolol (COSOPT) 2-0.5 % ophthalmic solution Place 1 drop into both eyes 2 times daily       LISINOPRIL PO Take 20 mg by mouth daily       fluticasone-salmeterol (ADVAIR) 250-50 MCG/DOSE diskus inhaler Inhale 1 puff into the lungs every 12 hours         MEDICATION CHANGES/RATIONALE:   Doxycycline completed 1/30/2108  Duonebs tapered and discontinued.   Controlled medications sent with patient: not applicable/none     ROS:    10  point ROS of systems including Constitutional, Eyes, Respiratory, Cardiovascular, Gastroenterology, Genitourinary, Integumentary, Muscularskeletal, Psychiatric were all negative except for pertinent positives noted in my HPI.    Physical Exam:   Vitals: /69  Pulse 81  Temp 97.7  F (36.5  C)  Resp 18  Wt 80 lb 12.8 oz (36.7 kg)  SpO2 97%  BMI 14.78 kg/m2  BMI= Body mass index is 14.78 kg/(m^2).    GENERAL APPEARANCE:  Alert, in no distress, thin  ENT:  Mouth and posterior oropharynx normal, moist mucous membranes, normal hearing acuity  EYES:  EOM normal, conjunctiva and lids normal  NECK:  No adenopathy,masses or thyromegaly  RESP:  respiratory effort and palpation of chest normal, no respiratory distress, diminished breath sounds bilaterally, no wheezes or crackles  CV:  Palpation and auscultation of heart done , regular rate and rhythm, no murmur,  no edema, +2 pedal pulses  ABDOMEN:  soft, nontender, no hepatosplenomegaly or other masses  M/S:   gait steady with walker. DIANA with good strength. No joint inflammation  SKIN:  no rashes or open areas  PSYCH:  oriented X 3, normal insight, judgement and memory    DISCHARGE PLAN:  Occupational Therapy, Physical Therapy and Home Health Aide and RN from [facility]  Patient instructed to follow-up with:  PCP in 7 days      Current [facility] scheduled appointments:  No future appointments.    MTM referral needed and placed by this provider: No    Pending labs: None  SNF labs   Last Basic Metabolic Panel:  Lab Results   Component Value Date     01/30/2018      Lab Results   Component Value Date    POTASSIUM 3.6 01/30/2018     Lab Results   Component Value Date    CHLORIDE 100 01/30/2018     Lab Results   Component Value Date    RICHARD 8.5 01/30/2018     Lab Results   Component Value Date    CO2 32 01/30/2018     Lab Results   Component Value Date    BUN 19 01/30/2018     Lab Results   Component Value Date    CR 0.57 01/30/2018     Lab Results    Component Value Date    GLC 67 01/30/2018     Lab Results   Component Value Date    WBC 12.7 01/30/2018     Lab Results   Component Value Date    RBC 4.65 01/30/2018     Lab Results   Component Value Date    HGB 14.1 01/30/2018     Lab Results   Component Value Date    HCT 42.5 01/30/2018     Lab Results   Component Value Date    MCV 91 01/30/2018     Lab Results   Component Value Date    MCH 30.3 01/30/2018     Lab Results   Component Value Date    MCHC 33.2 01/30/2018     Lab Results   Component Value Date    RDW 13.5 01/30/2018     Lab Results   Component Value Date     01/30/2018     Discharge Treatments: None    TOTAL DISCHARGE TIME:   Greater than 30 minutes  Electronically signed by:  ULI Salmeron CNP

## 2021-05-24 ENCOUNTER — OFFICE VISIT (OUTPATIENT)
Dept: INTERNAL MEDICINE | Facility: CLINIC | Age: 76
End: 2021-05-24
Payer: MEDICARE

## 2021-05-24 VITALS
WEIGHT: 75.6 LBS | BODY MASS INDEX: 15.87 KG/M2 | DIASTOLIC BLOOD PRESSURE: 86 MMHG | HEART RATE: 85 BPM | HEIGHT: 58 IN | OXYGEN SATURATION: 93 % | SYSTOLIC BLOOD PRESSURE: 157 MMHG | RESPIRATION RATE: 16 BRPM | TEMPERATURE: 97.3 F

## 2021-05-24 DIAGNOSIS — E78.5 HYPERLIPIDEMIA LDL GOAL <100: ICD-10-CM

## 2021-05-24 DIAGNOSIS — J44.1 COPD EXACERBATION (H): ICD-10-CM

## 2021-05-24 DIAGNOSIS — I10 ESSENTIAL HYPERTENSION: Primary | ICD-10-CM

## 2021-05-24 DIAGNOSIS — E55.9 VITAMIN D DEFICIENCY: ICD-10-CM

## 2021-05-24 LAB
ALT SERPL W P-5'-P-CCNC: 16 U/L (ref 0–50)
ANION GAP SERPL CALCULATED.3IONS-SCNC: 5 MMOL/L (ref 3–14)
BUN SERPL-MCNC: 13 MG/DL (ref 7–30)
CALCIUM SERPL-MCNC: 9.4 MG/DL (ref 8.5–10.1)
CHLORIDE SERPL-SCNC: 105 MMOL/L (ref 94–109)
CHOLEST SERPL-MCNC: 202 MG/DL
CO2 SERPL-SCNC: 27 MMOL/L (ref 20–32)
CREAT SERPL-MCNC: 0.72 MG/DL (ref 0.52–1.04)
ERYTHROCYTE [DISTWIDTH] IN BLOOD BY AUTOMATED COUNT: 14 % (ref 10–15)
GFR SERPL CREATININE-BSD FRML MDRD: 81 ML/MIN/{1.73_M2}
GLUCOSE SERPL-MCNC: 100 MG/DL (ref 70–99)
HCT VFR BLD AUTO: 46.7 % (ref 35–47)
HDLC SERPL-MCNC: 73 MG/DL
HGB BLD-MCNC: 15.2 G/DL (ref 11.7–15.7)
LDLC SERPL CALC-MCNC: 110 MG/DL
MCH RBC QN AUTO: 30.5 PG (ref 26.5–33)
MCHC RBC AUTO-ENTMCNC: 32.5 G/DL (ref 31.5–36.5)
MCV RBC AUTO: 94 FL (ref 78–100)
NONHDLC SERPL-MCNC: 129 MG/DL
PLATELET # BLD AUTO: 351 10E9/L (ref 150–450)
POTASSIUM SERPL-SCNC: 4 MMOL/L (ref 3.4–5.3)
RBC # BLD AUTO: 4.98 10E12/L (ref 3.8–5.2)
SODIUM SERPL-SCNC: 137 MMOL/L (ref 133–144)
TRIGL SERPL-MCNC: 95 MG/DL
WBC # BLD AUTO: 10.6 10E9/L (ref 4–11)

## 2021-05-24 PROCEDURE — 85027 COMPLETE CBC AUTOMATED: CPT | Performed by: NURSE PRACTITIONER

## 2021-05-24 PROCEDURE — 80061 LIPID PANEL: CPT | Performed by: NURSE PRACTITIONER

## 2021-05-24 PROCEDURE — 80048 BASIC METABOLIC PNL TOTAL CA: CPT | Performed by: NURSE PRACTITIONER

## 2021-05-24 PROCEDURE — 82306 VITAMIN D 25 HYDROXY: CPT | Performed by: NURSE PRACTITIONER

## 2021-05-24 PROCEDURE — 84460 ALANINE AMINO (ALT) (SGPT): CPT | Performed by: NURSE PRACTITIONER

## 2021-05-24 PROCEDURE — 99204 OFFICE O/P NEW MOD 45 MIN: CPT | Performed by: NURSE PRACTITIONER

## 2021-05-24 PROCEDURE — 36415 COLL VENOUS BLD VENIPUNCTURE: CPT | Performed by: NURSE PRACTITIONER

## 2021-05-24 RX ORDER — LISINOPRIL 20 MG/1
20 TABLET ORAL DAILY
Qty: 90 TABLET | Refills: 3 | Status: SHIPPED | OUTPATIENT
Start: 2021-05-24 | End: 2022-02-28

## 2021-05-24 RX ORDER — ATORVASTATIN CALCIUM 10 MG/1
10 TABLET, FILM COATED ORAL DAILY
Qty: 90 TABLET | Refills: 3 | Status: SHIPPED | OUTPATIENT
Start: 2021-05-24 | End: 2022-02-28

## 2021-05-24 ASSESSMENT — MIFFLIN-ST. JEOR: SCORE: 719.73

## 2021-05-24 NOTE — LETTER
May 26, 2021      Berenice Krause  [address]    [place] [address]        Dear ,    We are writing to inform you of your test results.      Your cholesterol is borderline. I would like you to work harder on your diet for now. You will need a follow up fasting cholesterol in 6-12 months. The rest of your results were normal range.   Resulted Orders   CBC with platelets   Result Value Ref Range    WBC 10.6 4.0 - 11.0 10e9/L    RBC Count 4.98 3.8 - 5.2 10e12/L    Hemoglobin 15.2 11.7 - 15.7 g/dL    Hematocrit 46.7 35.0 - 47.0 %    MCV 94 78 - 100 fl    MCH 30.5 26.5 - 33.0 pg    MCHC 32.5 31.5 - 36.5 g/dL    RDW 14.0 10.0 - 15.0 %    Platelet Count 351 150 - 450 10e9/L   Basic metabolic panel   Result Value Ref Range    Sodium 137 133 - 144 mmol/L    Potassium 4.0 3.4 - 5.3 mmol/L    Chloride 105 94 - 109 mmol/L    Carbon Dioxide 27 20 - 32 mmol/L    Anion Gap 5 3 - 14 mmol/L    Glucose 100 (H) 70 - 99 mg/dL      Comment:      Fasting specimen    Urea Nitrogen 13 7 - 30 mg/dL    Creatinine 0.72 0.52 - 1.04 mg/dL    GFR Estimate 81 >60 mL/min/[1.73_m2]      Comment:      Non  GFR Calc  Starting 12/18/2018, serum creatinine based estimated GFR (eGFR) will be   calculated using the Chronic Kidney Disease Epidemiology Collaboration   (CKD-EPI) equation.      GFR Estimate If Black >90 >60 mL/min/[1.73_m2]      Comment:       GFR Calc  Starting 12/18/2018, serum creatinine based estimated GFR (eGFR) will be   calculated using the Chronic Kidney Disease Epidemiology Collaboration   (CKD-EPI) equation.      Calcium 9.4 8.5 - 10.1 mg/dL   ALT   Result Value Ref Range    ALT 16 0 - 50 U/L   Lipid Profile   Result Value Ref Range    Cholesterol 202 (H) <200 mg/dL      Comment:      Desirable:       <200 mg/dl    Triglycerides 95 <150 mg/dL      Comment:      Fasting specimen    HDL Cholesterol 73 >49 mg/dL    LDL Cholesterol Calculated 110 (H) <100 mg/dL      Comment:       Above desirable:  100-129 mg/dl  Borderline High:  130-159 mg/dL  High:             160-189 mg/dL  Very high:       >189 mg/dl      Non HDL Cholesterol 129 <130 mg/dL   Vitamin D Deficiency   Result Value Ref Range    Vitamin D Deficiency screening 57 20 - 75 ug/L      Comment:      Season, race, dietary intake, and treatment affect the concentration of   25-hydroxy-Vitamin D. Values may decrease during winter months and increase   during summer months. Values 20-29 ug/L may indicate Vitamin D insufficiency   and values <20 ug/L may indicate Vitamin D deficiency.  Vitamin D determination is routinely performed by an immunoassay specific for   25 hydroxyvitamin D3.  If an individual is on vitamin D2 (ergocalciferol)   supplementation, please specify 25 OH vitamin D2 and D3 level determination by   LCMSMS test [id number].         If you have any questions or concerns, please call the clinic at the number listed above.       Sincerely,      Di Wynne NP

## 2021-05-24 NOTE — PROGRESS NOTES
Assessment & Plan     Essential hypertension    - lisinopril (ZESTRIL) 20 MG tablet; Take 1 tablet (20 mg) by mouth daily  - CBC with platelets  - Basic metabolic panel    Hyperlipidemia LDL goal <100    - atorvastatin (LIPITOR) 10 MG tablet; Take 1 tablet (10 mg) by mouth daily  - ALT  - Lipid Profile    COPD exacerbation (H)    - fluticasone-salmeterol (ADVAIR) 250-50 MCG/DOSE inhaler; Inhale 1 puff into the lungs every 12 hours    Vitamin D deficiency    - Vitamin D Deficiency                 Di Wynne NP  [facility] DIANNE Ng is a 75 year old who presents for the following health issues  accompanied by her niece:    HPI     Hyperlipidemia Follow-Up      Are you regularly taking any medication or supplement to lower your cholesterol?   Yes- statin    Are you having muscle aches or other side effects that you think could be caused by your cholesterol lowering medication?  No    Hypertension Follow-up      Do you check your blood pressure regularly outside of the clinic? No     Are you following a low salt diet? Yes    Are your blood pressures ever more than 140 on the top number (systolic) OR more   than 90 on the bottom number (diastolic), for example 140/90? na    Asthma Follow-Up    Was ACT completed today?  No      Do you have a cough?  No    Are you experiencing any wheezing in your chest?  No    Do you have any shortness of breath?  No     How often are you using a short-acting (rescue) inhaler or nebulizer, such as Albuterol?  2-3 times per day    How many days per week do you miss taking your asthma controller medication?  0    Please describe any recent triggers for your asthma: upper respiratory infections    Have you had any Emergency Room Visits, Urgent Care Visits, or Hospital Admissions since your last office visit?  No            Review of Systems   Constitutional, HEENT, cardiovascular, pulmonary, gi and gu systems are negative, except as  "otherwise noted.      Objective    BP (!) 157/86   Pulse 85   Temp 97.3  F (36.3  C) (Oral)   Resp 16   Ht 1.461 m (4' 9.5\")   Wt 34.3 kg (75 lb 9.6 oz)   LMP  (LMP Unknown)   SpO2 93%   BMI 16.08 kg/m    Body mass index is 16.08 kg/m .  Physical Exam   GENERAL: alert, no distress, frail, elderly and significant kyphosis  RESP: lungs clear to auscultation - no rales, rhonchi or wheezes  CV: regular rate and rhythm, normal S1 S2, no S3 or S4, no murmur, click or rub, no peripheral edema and peripheral pulses strong                "

## 2021-05-24 NOTE — NURSING NOTE
"Patient here with niece to establish care.  Vital signs:  Temp: 97.3  F (36.3  C) Temp src: Oral BP: (!) 157/86 Pulse: 85   Resp: 16 SpO2: 93 %     Height: 146.1 cm (4' 9.5\") Weight: 34.3 kg (75 lb 9.6 oz)  Estimated body mass index is 16.08 kg/m  as calculated from the following:    Height as of this encounter: 1.461 m (4' 9.5\").    Weight as of this encounter: 34.3 kg (75 lb 9.6 oz).        "

## 2021-05-25 LAB — DEPRECATED CALCIDIOL+CALCIFEROL SERPL-MC: 57 UG/L (ref 20–75)

## 2021-07-21 DIAGNOSIS — J44.1 COPD EXACERBATION (H): Primary | ICD-10-CM

## 2021-07-22 NOTE — TELEPHONE ENCOUNTER
Pending Prescriptions:                       Disp   Refills    fluticasone-salmeterol (ADVAIR) 250-50 MCG*                Sig: Inhale 1 puff into the lungs    Routing refill request to provider for review/approval because:  Medication is reported/historical

## 2022-01-06 NOTE — LETTER
[facility]  [address], [address]  [place], [place]  [address]                                            TEL:[phone]  FAX:[phone]      Berenice Krause  [address]  [place] [address]      January 10, 2022    Dear Berenice,    We are concerned about your health care.  We recently provided you with a medication refill.  Many medications require routine follow-up with your provider.      At this time we ask that: You schedule a routine office visit with your physician to follow your asthma    Your prescription: Cannot be refilled until the above noted follow up has been completed.      Thank you,      Di Wynne C.N.P.

## 2022-01-10 NOTE — TELEPHONE ENCOUNTER
Routing refill request to provider for review/approval because:  Patient needs to be seen because:  due for 6 month follow up  Failed protocol

## 2022-01-31 DIAGNOSIS — J44.1 COPD EXACERBATION (H): ICD-10-CM

## 2022-01-31 NOTE — TELEPHONE ENCOUNTER
Call to jessi. Advair is the medication that is needed. Advised due for office visit. [facility] has video visit scheduled. Patient is in an assisted living so they encourage patient to not go out. Nigustavo would also prefer to not take patient out due to the weather and it being icy out. States patient does not like to leave her home unless she absolutely has to. Advised reason for follow up appointment was to follow up for a blood pressure check. Shimaece states patient's spouse has a blood pressure cuff. Requested they check a couple of BPs at home prior to 2/2/22 video visit. They will do so. Shimagustavo is asking to wait until spring or fall for next appointment and then will schedule annual exam.    Routing refill request to provider for review/approval because:  Failed protocol

## 2022-01-31 NOTE — TELEPHONE ENCOUNTER
Reason for Call:  Other prescription    Detailed comments:  Patient is out of the medication bellow, please refill ASAP . Also pt has a ph visit on 2/2/22    Phone Number Patient can be reached at: Cell number on file:    Telephone Information:   Mobile [phone]       Best Time: anytime  This is her nice ph (Chari) she has c2c    Can we leave a detailed message on this number? YES    Call taken on 1/31/2022 at 11:56 AM by Joan Holden

## 2022-02-01 ENCOUNTER — MYC MEDICAL ADVICE (OUTPATIENT)
Dept: INTERNAL MEDICINE | Facility: CLINIC | Age: 77
End: 2022-02-01
Payer: COMMERCIAL

## 2022-02-20 ENCOUNTER — HEALTH MAINTENANCE LETTER (OUTPATIENT)
Age: 77
End: 2022-02-20

## 2022-02-28 ENCOUNTER — OFFICE VISIT (OUTPATIENT)
Dept: INTERNAL MEDICINE | Facility: CLINIC | Age: 77
End: 2022-02-28
Payer: MEDICARE

## 2022-02-28 VITALS
SYSTOLIC BLOOD PRESSURE: 129 MMHG | TEMPERATURE: 97.6 F | WEIGHT: 76.2 LBS | HEIGHT: 58 IN | RESPIRATION RATE: 16 BRPM | HEART RATE: 88 BPM | DIASTOLIC BLOOD PRESSURE: 79 MMHG | BODY MASS INDEX: 15.99 KG/M2 | OXYGEN SATURATION: 97 %

## 2022-02-28 DIAGNOSIS — E78.5 HYPERLIPIDEMIA LDL GOAL <100: ICD-10-CM

## 2022-02-28 DIAGNOSIS — I10 ESSENTIAL HYPERTENSION: Primary | ICD-10-CM

## 2022-02-28 DIAGNOSIS — Z11.59 NEED FOR HEPATITIS C SCREENING TEST: ICD-10-CM

## 2022-02-28 DIAGNOSIS — J44.1 COPD EXACERBATION (H): ICD-10-CM

## 2022-02-28 LAB
ERYTHROCYTE [DISTWIDTH] IN BLOOD BY AUTOMATED COUNT: 13.7 % (ref 10–15)
HCT VFR BLD AUTO: 46.3 % (ref 35–47)
HCV AB SERPL QL IA: NONREACTIVE
HGB BLD-MCNC: 15.1 G/DL (ref 11.7–15.7)
MCH RBC QN AUTO: 31 PG (ref 26.5–33)
MCHC RBC AUTO-ENTMCNC: 32.6 G/DL (ref 31.5–36.5)
MCV RBC AUTO: 95 FL (ref 78–100)
PLATELET # BLD AUTO: 266 10E3/UL (ref 150–450)
RBC # BLD AUTO: 4.87 10E6/UL (ref 3.8–5.2)
WBC # BLD AUTO: 8.2 10E3/UL (ref 4–11)

## 2022-02-28 PROCEDURE — 99214 OFFICE O/P EST MOD 30 MIN: CPT | Performed by: NURSE PRACTITIONER

## 2022-02-28 PROCEDURE — 80061 LIPID PANEL: CPT | Performed by: NURSE PRACTITIONER

## 2022-02-28 PROCEDURE — 85027 COMPLETE CBC AUTOMATED: CPT | Performed by: NURSE PRACTITIONER

## 2022-02-28 PROCEDURE — 36415 COLL VENOUS BLD VENIPUNCTURE: CPT | Performed by: NURSE PRACTITIONER

## 2022-02-28 PROCEDURE — 80053 COMPREHEN METABOLIC PANEL: CPT | Performed by: NURSE PRACTITIONER

## 2022-02-28 PROCEDURE — 86803 HEPATITIS C AB TEST: CPT | Performed by: NURSE PRACTITIONER

## 2022-02-28 RX ORDER — ATORVASTATIN CALCIUM 10 MG/1
10 TABLET, FILM COATED ORAL DAILY
Qty: 90 TABLET | Refills: 3 | Status: SHIPPED | OUTPATIENT
Start: 2022-02-28

## 2022-02-28 RX ORDER — LISINOPRIL 20 MG/1
20 TABLET ORAL DAILY
Qty: 90 TABLET | Refills: 3 | Status: SHIPPED | OUTPATIENT
Start: 2022-02-28

## 2022-02-28 NOTE — PROGRESS NOTES
Assessment & Plan     Essential hypertension    - REVIEW OF HEALTH MAINTENANCE PROTOCOL ORDERS  - lisinopril (ZESTRIL) 20 MG tablet; Take 1 tablet (20 mg) by mouth daily  - CBC with platelets; Future  - Comprehensive metabolic panel (BMP + Alb, Alk Phos, ALT, AST, Total. Bili, TP); Future  - CBC with platelets  - Comprehensive metabolic panel (BMP + Alb, Alk Phos, ALT, AST, Total. Bili, TP)    COPD exacerbation (H)    - fluticasone-salmeterol (ADVAIR) 250-50 MCG/DOSE inhaler; Inhale 1 puff into the lungs 2 times daily    Hyperlipidemia LDL goal <100    - atorvastatin (LIPITOR) 10 MG tablet; Take 1 tablet (10 mg) by mouth daily  - Lipid panel reflex to direct LDL Non-fasting; Future  - Lipid panel reflex to direct LDL Non-fasting    Need for hepatitis C screening test    - Hepatitis C Screen Reflex to HCV RNA Quant and Genotype; Future  - Hepatitis C Screen Reflex to HCV RNA Quant and Genotype    Calcium 7114-0356 mg daily, vitamin D 2000  Declines dexa scan  F/u 6 months               Di Wynne NP  [facility]EF[facility] is a 76 year old who presents for the following health issues  accompanied by her niece.    HPI     Hyperlipidemia Follow-Up      Are you regularly taking any medication or supplement to lower your cholesterol?   Yes- atorvastatin    Are you having muscle aches or other side effects that you think could be caused by your cholesterol lowering medication?  No    Hypertension Follow-up      Do you check your blood pressure regularly outside of the clinic? No     Are you following a low salt diet? No    Are your blood pressures ever more than 140 on the top number (systolic) OR more   than 90 on the bottom number (diastolic), for example 140/90? Yes      How many servings of fruits and vegetables do you eat daily?  2-3    On average, how many sweetened beverages do you drink each day (Examples: soda, juice, sweet tea, etc.  Do NOT count diet or  "artificially sweetened beverages)?   0    How many days per week do you exercise enough to make your heart beat faster? 0    How many minutes a day do you exercise enough to make your heart beat faster? 0    How many days per week do you miss taking your medication? 0    COPD Follow-Up    Overall, how are your COPD symptoms since your last clinic visit?  No change    How much fatigue or shortness of breath do you have when you are walking?  Same as usual    How much shortness of breath do you have when you are resting?  Same as usual    How often do you cough? Rarely    Have you noticed any change in your sputum/phlegm?  No    Have you experienced a recent fever? No    Please describe how far you can walk without stopping to rest:  Less than 1 block    How many flights of stairs are you able to walk up without stopping?  1    Have you had any Emergency Room Visits, Urgent Care Visits, or Hospital Admissions because of your COPD since your last office visit?  No    History   Smoking Status     Former Smoker   Smokeless Tobacco     Former User     No results found for: FEV1, [id number]      Review of Systems   CONSTITUTIONAL:POSITIVE  for weight loss  ENT/MOUTH: NEGATIVE for ear, mouth and throat problems  CV: NEGATIVE for chest pain, palpitations or peripheral edema  GI: NEGATIVE for nausea, abdominal pain, heartburn, or change in bowel habits  ROS otherwise negative      Objective    LMP  (LMP Unknown)   /79 (BP Location: Right arm, Patient Position: Chair, Cuff Size: Adult Small)   Pulse 88   Temp 97.6  F (36.4  C) (Oral)   Resp 16   Ht 1.461 m (4' 9.5\")   Wt 34.6 kg (76 lb 3.2 oz)   LMP  (LMP Unknown)   SpO2 97%   Breastfeeding No   BMI 16.20 kg/m      Physical Exam   GENERAL: alert, frail and elderly  NECK: no adenopathy, no asymmetry, masses, or scars and thyroid normal to palpation  RESP: lungs clear to auscultation - no rales, rhonchi or wheezes  CV: regular rate and rhythm, normal S1 S2, no S3 or " S4, no murmur, click or rub, no peripheral edema and peripheral pulses strong

## 2022-03-01 ENCOUNTER — MYC MEDICAL ADVICE (OUTPATIENT)
Dept: INTERNAL MEDICINE | Facility: CLINIC | Age: 77
End: 2022-03-01
Payer: COMMERCIAL

## 2022-03-01 DIAGNOSIS — J44.1 COPD EXACERBATION (H): ICD-10-CM

## 2022-03-01 LAB
ALBUMIN SERPL-MCNC: 3.6 G/DL (ref 3.4–5)
ALP SERPL-CCNC: 93 U/L (ref 40–150)
ALT SERPL W P-5'-P-CCNC: 19 U/L (ref 0–50)
ANION GAP SERPL CALCULATED.3IONS-SCNC: 8 MMOL/L (ref 3–14)
AST SERPL W P-5'-P-CCNC: 16 U/L (ref 0–45)
BILIRUB SERPL-MCNC: 0.6 MG/DL (ref 0.2–1.3)
BUN SERPL-MCNC: 17 MG/DL (ref 7–30)
CALCIUM SERPL-MCNC: 8.9 MG/DL (ref 8.5–10.1)
CHLORIDE BLD-SCNC: 106 MMOL/L (ref 94–109)
CHOLEST SERPL-MCNC: 213 MG/DL
CO2 SERPL-SCNC: 25 MMOL/L (ref 20–32)
CREAT SERPL-MCNC: 0.62 MG/DL (ref 0.52–1.04)
FASTING STATUS PATIENT QL REPORTED: NO
GFR SERPL CREATININE-BSD FRML MDRD: >90 ML/MIN/1.73M2
GLUCOSE BLD-MCNC: 154 MG/DL (ref 70–99)
HDLC SERPL-MCNC: 78 MG/DL
LDLC SERPL CALC-MCNC: 116 MG/DL
NONHDLC SERPL-MCNC: 135 MG/DL
POTASSIUM BLD-SCNC: 4 MMOL/L (ref 3.4–5.3)
PROT SERPL-MCNC: 7.7 G/DL (ref 6.8–8.8)
SODIUM SERPL-SCNC: 139 MMOL/L (ref 133–144)
TRIGL SERPL-MCNC: 94 MG/DL

## 2022-03-01 NOTE — TELEPHONE ENCOUNTER
Prescription approved per [facility] Refill Protocol.    See myc message    Myc message sent to patient

## 2022-05-09 DIAGNOSIS — J44.1 COPD EXACERBATION (H): ICD-10-CM

## 2022-05-11 RX ORDER — FLUTICASONE PROPIONATE AND SALMETEROL 50; 250 UG/1; UG/1
POWDER RESPIRATORY (INHALATION)
Qty: 1 EACH | Refills: 3 | Status: SHIPPED | OUTPATIENT
Start: 2022-05-11

## 2022-05-11 NOTE — TELEPHONE ENCOUNTER
Pending Prescriptions:                       Disp   Refills    ADVAIR DISKUS 250-50 MCG/DOSE inhaler [Pha*       3        Sig: INHALE ONE PUFF BY MOUTH TWICE A DAY    Routing refill request to provider for review/approval because:  Needs provider approval

## 2022-10-23 ENCOUNTER — HEALTH MAINTENANCE LETTER (OUTPATIENT)
Age: 77
End: 2022-10-23

## 2023-04-02 ENCOUNTER — HEALTH MAINTENANCE LETTER (OUTPATIENT)
Age: 78
End: 2023-04-02

## 2023-05-23 DIAGNOSIS — J44.1 COPD EXACERBATION (H): ICD-10-CM

## 2023-05-23 DIAGNOSIS — E78.5 HYPERLIPIDEMIA LDL GOAL <100: ICD-10-CM

## 2023-05-23 DIAGNOSIS — I10 ESSENTIAL HYPERTENSION: ICD-10-CM

## 2023-05-24 NOTE — TELEPHONE ENCOUNTER
Patient has not been here since 2/22.  Call the patient and advise Di Wynne is no longer here, needs to schedule with someone else.  Once that appointment is scheduled, I will send enough medication to last until the appointment.

## 2023-05-24 NOTE — TELEPHONE ENCOUNTER
Pending Prescriptions:                       Disp   Refills    lisinopril (ZESTRIL) 20 MG tablet [Pharmac*90 tab*3        Sig: Take 1 tablet (20 mg) by mouth daily    atorvastatin (LIPITOR) 10 MG tablet [Pharm*90 tab*3        Sig: Take 1 tablet (10 mg) by mouth daily    ADVAIR DISKUS 250-50 MCG/ACT inhaler [Phar*       3        Sig: INHALE 1 PUFF INTO THE LUNGS 2 TIMES DAILY    Routing refill request to provider for review/approval because:  Needs provider review

## 2023-06-05 NOTE — TELEPHONE ENCOUNTER
Patient advised and agrees to plan.  Please route to provider when appointment is made. She plans to check her schedule and call back.

## 2023-06-08 RX ORDER — LISINOPRIL 20 MG/1
20 TABLET ORAL DAILY
Qty: 90 TABLET | Refills: 3 | OUTPATIENT
Start: 2023-06-08

## 2023-06-08 RX ORDER — FLUTICASONE PROPIONATE AND SALMETEROL 50; 250 UG/1; UG/1
POWDER RESPIRATORY (INHALATION)
Refills: 3 | OUTPATIENT
Start: 2023-06-08

## 2023-06-08 RX ORDER — ATORVASTATIN CALCIUM 10 MG/1
10 TABLET, FILM COATED ORAL DAILY
Qty: 90 TABLET | Refills: 3 | OUTPATIENT
Start: 2023-06-08

## 2023-06-08 NOTE — TELEPHONE ENCOUNTER
Called patient and she stated she is going to [facility] now.  Will deny request and close encounter.

## 2024-08-11 ENCOUNTER — HEALTH MAINTENANCE LETTER (OUTPATIENT)
Age: 79
End: 2024-08-11

## 2024-10-30 ENCOUNTER — TRANSFERRED RECORDS (OUTPATIENT)
Dept: HEALTH INFORMATION MANAGEMENT | Facility: CLINIC | Age: 79
End: 2024-10-30
Payer: COMMERCIAL

## 2024-11-04 ENCOUNTER — APPOINTMENT (OUTPATIENT)
Dept: MRI IMAGING | Facility: CLINIC | Age: 79
DRG: 125 | End: 2024-11-04
Attending: INTERNAL MEDICINE
Payer: MEDICARE

## 2024-11-04 ENCOUNTER — TRANSFERRED RECORDS (OUTPATIENT)
Dept: HEALTH INFORMATION MANAGEMENT | Facility: CLINIC | Age: 79
End: 2024-11-04

## 2024-11-04 ENCOUNTER — APPOINTMENT (OUTPATIENT)
Dept: CT IMAGING | Facility: CLINIC | Age: 79
DRG: 125 | End: 2024-11-04
Attending: EMERGENCY MEDICINE
Payer: MEDICARE

## 2024-11-04 ENCOUNTER — HOSPITAL ENCOUNTER (INPATIENT)
Facility: CLINIC | Age: 79
LOS: 2 days | Discharge: HOME HEALTH | DRG: 125 | End: 2024-11-06
Attending: EMERGENCY MEDICINE | Admitting: INTERNAL MEDICINE
Payer: MEDICARE

## 2024-11-04 DIAGNOSIS — H53.8 BLURRED VISION, LEFT EYE: ICD-10-CM

## 2024-11-04 DIAGNOSIS — R93.89 ABNORMAL VENTRICULAR WALL MOTION: ICD-10-CM

## 2024-11-04 DIAGNOSIS — R41.3 MEMORY DEFICIT: Primary | ICD-10-CM

## 2024-11-04 DIAGNOSIS — I63.9 ACUTE CVA (CEREBROVASCULAR ACCIDENT) (H): ICD-10-CM

## 2024-11-04 DIAGNOSIS — H34.12 CENTRAL RETINAL ARTERY OCCLUSION OF LEFT EYE: ICD-10-CM

## 2024-11-04 LAB
ANION GAP SERPL CALCULATED.3IONS-SCNC: 12 MMOL/L (ref 7–15)
APTT PPP: 30 SECONDS (ref 22–38)
ATRIAL RATE - MUSE: 90 BPM
BASOPHILS # BLD AUTO: 0.1 10E3/UL (ref 0–0.2)
BASOPHILS NFR BLD AUTO: 1 %
BUN SERPL-MCNC: 10.5 MG/DL (ref 8–23)
CALCIUM SERPL-MCNC: 9.1 MG/DL (ref 8.8–10.4)
CHLORIDE SERPL-SCNC: 99 MMOL/L (ref 98–107)
CREAT SERPL-MCNC: 0.56 MG/DL (ref 0.51–0.95)
DIASTOLIC BLOOD PRESSURE - MUSE: NORMAL MMHG
EGFRCR SERPLBLD CKD-EPI 2021: >90 ML/MIN/1.73M2
EOSINOPHIL # BLD AUTO: 0.4 10E3/UL (ref 0–0.7)
EOSINOPHIL NFR BLD AUTO: 4 %
ERYTHROCYTE [DISTWIDTH] IN BLOOD BY AUTOMATED COUNT: 13.8 % (ref 10–15)
GLUCOSE SERPL-MCNC: 108 MG/DL (ref 70–99)
HCO3 SERPL-SCNC: 26 MMOL/L (ref 22–29)
HCT VFR BLD AUTO: 44.4 % (ref 35–47)
HGB BLD-MCNC: 14.5 G/DL (ref 11.7–15.7)
IMM GRANULOCYTES # BLD: 0 10E3/UL
IMM GRANULOCYTES NFR BLD: 0 %
INR PPP: 1.14 (ref 0.85–1.15)
INTERPRETATION ECG - MUSE: NORMAL
LYMPHOCYTES # BLD AUTO: 2.3 10E3/UL (ref 0.8–5.3)
LYMPHOCYTES NFR BLD AUTO: 25 %
MCH RBC QN AUTO: 29.5 PG (ref 26.5–33)
MCHC RBC AUTO-ENTMCNC: 32.7 G/DL (ref 31.5–36.5)
MCV RBC AUTO: 90 FL (ref 78–100)
MONOCYTES # BLD AUTO: 0.8 10E3/UL (ref 0–1.3)
MONOCYTES NFR BLD AUTO: 9 %
NEUTROPHILS # BLD AUTO: 5.8 10E3/UL (ref 1.6–8.3)
NEUTROPHILS NFR BLD AUTO: 61 %
NRBC # BLD AUTO: 0 10E3/UL
NRBC BLD AUTO-RTO: 0 /100
P AXIS - MUSE: 60 DEGREES
PLATELET # BLD AUTO: 317 10E3/UL (ref 150–450)
POTASSIUM SERPL-SCNC: 3.9 MMOL/L (ref 3.4–5.3)
PR INTERVAL - MUSE: 192 MS
QRS DURATION - MUSE: 114 MS
QT - MUSE: 374 MS
QTC - MUSE: 457 MS
R AXIS - MUSE: -17 DEGREES
RBC # BLD AUTO: 4.92 10E6/UL (ref 3.8–5.2)
SODIUM SERPL-SCNC: 137 MMOL/L (ref 135–145)
SYSTOLIC BLOOD PRESSURE - MUSE: NORMAL MMHG
T AXIS - MUSE: 13 DEGREES
TROPONIN T SERPL HS-MCNC: 16 NG/L
TROPONIN T SERPL HS-MCNC: 16 NG/L
VENTRICULAR RATE- MUSE: 90 BPM
WBC # BLD AUTO: 9.4 10E3/UL (ref 4–11)

## 2024-11-04 PROCEDURE — 70553 MRI BRAIN STEM W/O & W/DYE: CPT | Mod: MA

## 2024-11-04 PROCEDURE — 85610 PROTHROMBIN TIME: CPT | Performed by: EMERGENCY MEDICINE

## 2024-11-04 PROCEDURE — 36415 COLL VENOUS BLD VENIPUNCTURE: CPT | Performed by: EMERGENCY MEDICINE

## 2024-11-04 PROCEDURE — 70498 CT ANGIOGRAPHY NECK: CPT | Mod: MA

## 2024-11-04 PROCEDURE — 250N000009 HC RX 250: Performed by: INTERNAL MEDICINE

## 2024-11-04 PROCEDURE — 250N000013 HC RX MED GY IP 250 OP 250 PS 637: Performed by: EMERGENCY MEDICINE

## 2024-11-04 PROCEDURE — 82465 ASSAY BLD/SERUM CHOLESTEROL: CPT | Performed by: INTERNAL MEDICINE

## 2024-11-04 PROCEDURE — 70496 CT ANGIOGRAPHY HEAD: CPT | Mod: MA

## 2024-11-04 PROCEDURE — 84484 ASSAY OF TROPONIN QUANT: CPT | Performed by: EMERGENCY MEDICINE

## 2024-11-04 PROCEDURE — 99222 1ST HOSP IP/OBS MODERATE 55: CPT | Mod: AI | Performed by: INTERNAL MEDICINE

## 2024-11-04 PROCEDURE — 80048 BASIC METABOLIC PNL TOTAL CA: CPT | Performed by: EMERGENCY MEDICINE

## 2024-11-04 PROCEDURE — 250N000011 HC RX IP 250 OP 636: Performed by: EMERGENCY MEDICINE

## 2024-11-04 PROCEDURE — 120N000001 HC R&B MED SURG/OB

## 2024-11-04 PROCEDURE — 99207 PR NO BILLABLE SERVICE THIS VISIT: CPT | Performed by: PHYSICIAN ASSISTANT

## 2024-11-04 PROCEDURE — 70450 CT HEAD/BRAIN W/O DYE: CPT | Mod: MA

## 2024-11-04 PROCEDURE — 85730 THROMBOPLASTIN TIME PARTIAL: CPT | Performed by: EMERGENCY MEDICINE

## 2024-11-04 PROCEDURE — 250N000009 HC RX 250: Performed by: EMERGENCY MEDICINE

## 2024-11-04 PROCEDURE — 99285 EMERGENCY DEPT VISIT HI MDM: CPT | Mod: 25

## 2024-11-04 PROCEDURE — 93005 ELECTROCARDIOGRAM TRACING: CPT

## 2024-11-04 PROCEDURE — 85004 AUTOMATED DIFF WBC COUNT: CPT | Performed by: EMERGENCY MEDICINE

## 2024-11-04 PROCEDURE — 83036 HEMOGLOBIN GLYCOSYLATED A1C: CPT | Performed by: INTERNAL MEDICINE

## 2024-11-04 RX ORDER — IPRATROPIUM BROMIDE AND ALBUTEROL SULFATE 2.5; .5 MG/3ML; MG/3ML
3 SOLUTION RESPIRATORY (INHALATION) ONCE
Status: COMPLETED | OUTPATIENT
Start: 2024-11-04 | End: 2024-11-04

## 2024-11-04 RX ORDER — IOPAMIDOL 755 MG/ML
67 INJECTION, SOLUTION INTRAVASCULAR ONCE
Status: COMPLETED | OUTPATIENT
Start: 2024-11-04 | End: 2024-11-04

## 2024-11-04 RX ORDER — LORAZEPAM 1 MG/1
1 TABLET ORAL ONCE
Status: COMPLETED | OUTPATIENT
Start: 2024-11-04 | End: 2024-11-04

## 2024-11-04 RX ORDER — HYDROMORPHONE HCL IN WATER/PF 6 MG/30 ML
0.2 PATIENT CONTROLLED ANALGESIA SYRINGE INTRAVENOUS
Status: COMPLETED | OUTPATIENT
Start: 2024-11-04 | End: 2024-11-05

## 2024-11-04 RX ORDER — IPRATROPIUM BROMIDE AND ALBUTEROL SULFATE 2.5; .5 MG/3ML; MG/3ML
3 SOLUTION RESPIRATORY (INHALATION) EVERY 4 HOURS PRN
Status: DISCONTINUED | OUTPATIENT
Start: 2024-11-04 | End: 2024-11-06 | Stop reason: HOSPADM

## 2024-11-04 RX ADMIN — IOPAMIDOL 67 ML: 755 INJECTION, SOLUTION INTRAVENOUS at 19:26

## 2024-11-04 RX ADMIN — IPRATROPIUM BROMIDE AND ALBUTEROL SULFATE 3 ML: .5; 3 SOLUTION RESPIRATORY (INHALATION) at 22:28

## 2024-11-04 RX ADMIN — LORAZEPAM 1 MG: 1 TABLET ORAL at 17:51

## 2024-11-04 RX ADMIN — SODIUM CHLORIDE 100 ML: 9 INJECTION, SOLUTION INTRAVENOUS at 19:26

## 2024-11-04 ASSESSMENT — ACTIVITIES OF DAILY LIVING (ADL)
ADLS_ACUITY_SCORE: 0

## 2024-11-04 NOTE — ED PROVIDER NOTES
Emergency Department Note      History of Present Illness   Chief Complaint   Loss of Vision    HPI   Berenice Krause is a 79 year old female with a history of hypertension, hyperglycemia and COPD who presents for evaluation of stroke like symptoms. She has been experiencing monocular blurry vision in her left eye for over two and half weeks, for which she was visiting retina specialist clinic today. She was referred to ED for the concerns of ongoing stroke. She denies headache, new vision changes, numbness or tingling of arms or legs, diffculty speaking, abdominal pain, fever, nausea, vomiting or diarreha. She denies past history of stroke and denies blood thinner use. She notes 15 years of smoking history which she quit 15 years ago. Of note, she is not using oxygen at home.    Independent Historian   niece as detailed above.    Review of External Notes   None    Past Medical History     Medical History and Problem List   COPD   Glaucoma  HTN   Malnutrition  Hyperglycemia  Hypertension   Smoking     Medications   Advair inhaler  albuterol Inhaler  atorvastatin  lisinopril   Norco     Surgical History   Atrial septal defect repair  Salpingo oophorectomy  Appendectomy   Open heart surgery   Breat biopsy    Physical Exam     Patient Vitals for the past 24 hrs:   BP Temp Temp src Pulse Resp SpO2 Height Weight   11/04/24 2129 94/48 -- -- 87 19 -- -- --   11/04/24 2043 -- -- -- 87 21 -- -- --   11/04/24 2028 121/62 -- -- 103 -- -- -- --   11/04/24 2013 -- -- -- 92 -- -- -- --   11/04/24 1958 123/76 -- -- 96 18 -- -- --   11/04/24 1913 -- -- -- 91 30 -- -- --   11/04/24 1858 121/63 -- -- 101 -- -- -- --   11/04/24 1800 109/65 -- -- 92 20 -- -- --   11/04/24 1753 -- -- -- 97 -- 91 % -- --   11/04/24 1738 -- -- -- 87 -- -- -- --   11/04/24 1732 123/77 -- -- 92 -- -- -- --   11/04/24 1722 -- -- -- 89 -- -- -- --   11/04/24 1712 -- -- -- 94 -- -- -- --   11/04/24 1702 128/71 -- -- 90 15 -- -- --   11/04/24 1630 136/82 -- -- 93  "20 93 % -- --   11/04/24 1522 (!) 144/77 98.2  F (36.8  C) Tympanic 97 19 94 % 1.575 m (5' 2\") 35.8 kg (79 lb)     Physical Exam  General: Alert, appears frail and elderly. Cooperative.     In mild distress  HEENT:  Head:  Atraumatic  Ears:  External ears are normal  Mouth/Throat:  Oropharynx is without erythema or exudate and mucous membranes are moist.   Eyes:   Conjunctivae normal and EOM are normal. No scleral icterus.     Dilated pupils bilaterally, non-responsive (patient had dilated eye exam with retinal specialist just before coming into ED).   Neck:   Normal range of motion. Neck supple.  CV:  Normal rate, regular rhythm, normal heart sounds and radial pulses are 2+ and symmetric.    Resp:  Breath sounds are clear bilaterally    Non-labored, no retractions or accessory muscle use  GI:  Abdomen is soft, no distension, no tenderness. No rebound or guarding.  No CVA tenderness bilaterally  MS:  Normal range of motion. No edema.    Barrel chest.      Normal strength in all 4 extremities.     Back atraumatic.    No midline cervical, thoracic, or lumbar tenderness  Skin:  Warm and dry.  No rash or lesions noted.  Neuro:   Alert. Normal strength.  Sensation intact in all 4 extremities. GCS: 15    Cranial nerves 2-12 intact.  Psych: Normal mood and affect.    Diagnostics     Lab Results   Labs Ordered and Resulted from Time of ED Arrival to Time of ED Departure   BASIC METABOLIC PANEL - Abnormal       Result Value    Sodium 137      Potassium 3.9      Chloride 99      Carbon Dioxide (CO2) 26      Anion Gap 12      Urea Nitrogen 10.5      Creatinine 0.56      GFR Estimate >90      Calcium 9.1      Glucose 108 (*)    TROPONIN T, HIGH SENSITIVITY - Abnormal    Troponin T, High Sensitivity 16 (*)    TROPONIN T, HIGH SENSITIVITY - Abnormal    Troponin T, High Sensitivity 16 (*)    INR - Normal    INR 1.14     PARTIAL THROMBOPLASTIN TIME - Normal    aPTT 30     CBC WITH PLATELETS AND DIFFERENTIAL    WBC Count 9.4      " RBC Count 4.92      Hemoglobin 14.5      Hematocrit 44.4      MCV 90      MCH 29.5      MCHC 32.7      RDW 13.8      Platelet Count 317      % Neutrophils 61      % Lymphocytes 25      % Monocytes 9      % Eosinophils 4      % Basophils 1      % Immature Granulocytes 0      NRBCs per 100 WBC 0      Absolute Neutrophils 5.8      Absolute Lymphocytes 2.3      Absolute Monocytes 0.8      Absolute Eosinophils 0.4      Absolute Basophils 0.1      Absolute Immature Granulocytes 0.0      Absolute NRBCs 0.0         Imaging   CT Head w/o Contrast   Final Result   IMPRESSION:   1.  No acute intracranial process.      CTA Head Neck with Contrast   Final Result   IMPRESSION:       HEAD CTA:    1.  Normal CTA [place].      NECK CTA:   1.  No definite hemodynamically significant narrowing throughout major neck vessels.           ECG results from 11/04/24   EKG 12-lead, tracing only     Value    Systolic Blood Pressure     Diastolic Blood Pressure     Ventricular Rate 90    Atrial Rate 90    SD Interval 192    QRS Duration 114        QTc 457    P Axis 60    R AXIS -17    T Axis 13    Interpretation ECG      Sinus rhythm  Minimal voltage criteria for LVH, may be normal variant ( Jah product )  Inferior infarct , age undetermined  Cannot rule out Anterior infarct Abnormal ECG  When compared with ECG of 21-Jan-2018 21:10,  Significant changes have occurred  Confirmed by GENERATED REPORT, COMPUTER ([id number]),  Kay Hernandez ([id number]) on 11/4/2024 4:56:01 PM           Independent Interpretation   CT Head: No intracranial hemorrhage.    ED Course      Medications Administered   Medications   LORazepam (ATIVAN) tablet 1 mg (1 mg Oral $Given 11/4/24 7416)   Saline (100 mLs As instructed $Given 11/4/24 1926)   iopamidol (ISOVUE-370) solution 67 mL (67 mLs Intravenous $Given 11/4/24 1926)       Procedures   Procedures     Discussion of Management   Admitting HospitalistJavier    ED Course   ED Course as of  11/04/24 2214 Mon Nov 04, 2024   1614 I obtained the history and examined the patient as above.    2055 Spoke with Dr. Broderick with stroke neurology regarding presentation   2125 I spoke with Dr. Herrera of the hospitalist service who agreed to admission.       Additional Documentation  None    Medical Decision Making / Diagnosis     CMS Diagnoses: None    MIPS       None    MDM   Berenice Krause is a 79 year old female with a history of hypertension, hypercholesterolemia, and COPD who presents with left eye vision changes for the last 2-1/2 to 3 weeks.  Patient was sent in by a retinal specialist who had concerns for a central retinal artery occlusion of the left eye.  Patient unfortunately has some underlying dementia and so is a challenging historian to give accurate timeline or sequence of events.  Patient's niece is able to provide some additional history here today.  There has been no other neurologic symptoms beyond the vision changes to the left eye.  Given presence of CRAO we did plan to obtain MRI imaging of the brain as well as angiography of the head and neck.  Unfortunately the patient is unable to tolerate MRI imaging of the brain given severe discomfort when she attempts to lie flat.  We did initially trial with some Ativan but this was still unobtainable.  Patient was ultimately sent for CT imaging of the head including angiography of the head and neck.  Thankfully no sinister pathologies identified on CT imaging of the head.  CTA shows normal [place] and no definite hemodynamic narrowing throughout the major blood vessels of the neck.  EKG showed normal sinus rhythm with no concerning ischemic changes nor arrhythmias.  Patient's troponin studies have been nondynamic and low concern for ACS.  Electrolytes, renal function, and CBC within normal range.  I spoke with stroke neurology who had recommended full dose aspirin and admission for further workup of CRAO/stroke.  I spoke with   Javier of the hospitalist service who agreed to admission for further evaluation and management.    Disposition   The patient was admitted to the hospital.     Diagnosis     ICD-10-CM    1. Blurred vision, left eye  H53.8       2. 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 Acute CVA (cerebrovascular accident) (H)  I63.9            Discharge Medications   New Prescriptions    No medications on file     Scribe Disclosure:  Becky ELENA, am serving as a scribe at 4:16 PM on 11/4/2024 to document services personally performed by Bon Kerns MD based on my observations and the provider's statements to me.        Bon Kerns MD  11/04/24 [id number]

## 2024-11-04 NOTE — CONSULTS
"  [facility]    Stroke Telephone Note    I was called by Bon Kerns on 11/04/24 regarding patient Berenice Krause. The patient is a 79 year old female who presents at the recommendation of her retinal specialist because she has had left eye vision loss for the last 2.5 to 3 weeks and he thought she had a retinal artery occlusion.  It is apparently blurry and gray.  She is otherwise nonfocal on exam for Dr. Kerns..    No prior history of stroke and not on any blood thinners at home.  Past medical history of COPD, hypertension    Vitals  BP: 123/77   Pulse: 92   Resp: 15   Temp: 98.2  F (36.8  C)   Weight: 35.8 kg (79 lb)    Imaging Findings  None yet    Impression  Suspected left side retinal artery occlusion    Recommendations  -Check brain MRI with and without contrast and MRA head/neck.  She is claustrophobic; if unable to tolerate MRI then get CTA head/neck.  Tentatively treat with just aspirin instead of DAPT given the long duration from onset of symptoms but we can make a final recommendation on this after the neuroimaging is done    Case discussed with vascular neurology attending Dr. Flynn.    My recommendations are based on the information provided over the phone by Berenice Krause's in-person providers. They are not intended to replace the clinical judgment of her in-person providers. I was not requested to personally see or examine the patient at this time.     Lisa Morataya PA-C  Vascular Neurology    To page me or covering stroke neurology team member, click here: AMCOM  Choose \"On Call\" tab at top, then select \"NEUROLOGY/ALL SITES\" from middle drop-down box, press Enter, then look for \"stroke\" or \"telestroke\" for your site.    "

## 2024-11-05 ENCOUNTER — ORDERS ONLY (AUTO-RELEASED) (OUTPATIENT)
Dept: MEDSURG UNIT | Facility: CLINIC | Age: 79
End: 2024-11-05

## 2024-11-05 ENCOUNTER — APPOINTMENT (OUTPATIENT)
Dept: CARDIOLOGY | Facility: CLINIC | Age: 79
DRG: 125 | End: 2024-11-05
Attending: INTERNAL MEDICINE
Payer: MEDICARE

## 2024-11-05 ENCOUNTER — APPOINTMENT (OUTPATIENT)
Dept: PHYSICAL THERAPY | Facility: CLINIC | Age: 79
DRG: 125 | End: 2024-11-05
Attending: INTERNAL MEDICINE
Payer: MEDICARE

## 2024-11-05 DIAGNOSIS — I63.9 ACUTE CVA (CEREBROVASCULAR ACCIDENT) (H): ICD-10-CM

## 2024-11-05 LAB
ANION GAP SERPL CALCULATED.3IONS-SCNC: 14 MMOL/L (ref 7–15)
BUN SERPL-MCNC: 12 MG/DL (ref 8–23)
CALCIUM SERPL-MCNC: 9 MG/DL (ref 8.8–10.4)
CHLORIDE SERPL-SCNC: 98 MMOL/L (ref 98–107)
CHOLEST SERPL-MCNC: 188 MG/DL
CREAT SERPL-MCNC: 0.51 MG/DL (ref 0.51–0.95)
CRP SERPL-MCNC: <3 MG/L
EGFRCR SERPLBLD CKD-EPI 2021: >90 ML/MIN/1.73M2
ERYTHROCYTE [DISTWIDTH] IN BLOOD BY AUTOMATED COUNT: 13.9 % (ref 10–15)
ERYTHROCYTE [SEDIMENTATION RATE] IN BLOOD BY WESTERGREN METHOD: 13 MM/HR (ref 0–30)
EST. AVERAGE GLUCOSE BLD GHB EST-MCNC: 137 MG/DL
GLUCOSE BLDC GLUCOMTR-MCNC: 106 MG/DL (ref 70–99)
GLUCOSE BLDC GLUCOMTR-MCNC: 116 MG/DL (ref 70–99)
GLUCOSE BLDC GLUCOMTR-MCNC: 99 MG/DL (ref 70–99)
GLUCOSE SERPL-MCNC: 109 MG/DL (ref 70–99)
HBA1C MFR BLD: 6.4 %
HCO3 SERPL-SCNC: 26 MMOL/L (ref 22–29)
HCT VFR BLD AUTO: 41.5 % (ref 35–47)
HDLC SERPL-MCNC: 57 MG/DL
HGB BLD-MCNC: 13.8 G/DL (ref 11.7–15.7)
LDLC SERPL CALC-MCNC: 113 MG/DL
LVEF ECHO: NORMAL
MCH RBC QN AUTO: 30.3 PG (ref 26.5–33)
MCHC RBC AUTO-ENTMCNC: 33.3 G/DL (ref 31.5–36.5)
MCV RBC AUTO: 91 FL (ref 78–100)
NONHDLC SERPL-MCNC: 131 MG/DL
PLATELET # BLD AUTO: 328 10E3/UL (ref 150–450)
POTASSIUM SERPL-SCNC: 4.1 MMOL/L (ref 3.4–5.3)
RBC # BLD AUTO: 4.56 10E6/UL (ref 3.8–5.2)
SODIUM SERPL-SCNC: 138 MMOL/L (ref 135–145)
TRIGL SERPL-MCNC: 88 MG/DL
WBC # BLD AUTO: 9.1 10E3/UL (ref 4–11)

## 2024-11-05 PROCEDURE — 99222 1ST HOSP IP/OBS MODERATE 55: CPT | Performed by: PHYSICIAN ASSISTANT

## 2024-11-05 PROCEDURE — 80048 BASIC METABOLIC PNL TOTAL CA: CPT | Performed by: INTERNAL MEDICINE

## 2024-11-05 PROCEDURE — 97530 THERAPEUTIC ACTIVITIES: CPT | Mod: GP

## 2024-11-05 PROCEDURE — 85014 HEMATOCRIT: CPT | Performed by: INTERNAL MEDICINE

## 2024-11-05 PROCEDURE — 85652 RBC SED RATE AUTOMATED: CPT | Performed by: PHYSICIAN ASSISTANT

## 2024-11-05 PROCEDURE — 250N000011 HC RX IP 250 OP 636: Performed by: INTERNAL MEDICINE

## 2024-11-05 PROCEDURE — 999N000208 ECHOCARDIOGRAM COMPLETE

## 2024-11-05 PROCEDURE — 250N000009 HC RX 250: Performed by: INTERNAL MEDICINE

## 2024-11-05 PROCEDURE — A9585 GADOBUTROL INJECTION: HCPCS | Performed by: INTERNAL MEDICINE

## 2024-11-05 PROCEDURE — 120N000001 HC R&B MED SURG/OB

## 2024-11-05 PROCEDURE — 97116 GAIT TRAINING THERAPY: CPT | Mod: GP

## 2024-11-05 PROCEDURE — 255N000002 HC RX 255 OP 636: Performed by: INTERNAL MEDICINE

## 2024-11-05 PROCEDURE — 97161 PT EVAL LOW COMPLEX 20 MIN: CPT | Mod: GP

## 2024-11-05 PROCEDURE — 93306 TTE W/DOPPLER COMPLETE: CPT | Mod: 26 | Performed by: INTERNAL MEDICINE

## 2024-11-05 PROCEDURE — 36415 COLL VENOUS BLD VENIPUNCTURE: CPT | Performed by: INTERNAL MEDICINE

## 2024-11-05 PROCEDURE — 99233 SBSQ HOSP IP/OBS HIGH 50: CPT | Performed by: STUDENT IN AN ORGANIZED HEALTH CARE EDUCATION/TRAINING PROGRAM

## 2024-11-05 PROCEDURE — 999N000226 HC STATISTIC SLP IP EVAL DEFER: Performed by: SPEECH-LANGUAGE PATHOLOGIST

## 2024-11-05 PROCEDURE — 82947 ASSAY GLUCOSE BLOOD QUANT: CPT | Performed by: INTERNAL MEDICINE

## 2024-11-05 PROCEDURE — 86140 C-REACTIVE PROTEIN: CPT | Performed by: PHYSICIAN ASSISTANT

## 2024-11-05 PROCEDURE — 250N000013 HC RX MED GY IP 250 OP 250 PS 637: Performed by: INTERNAL MEDICINE

## 2024-11-05 RX ORDER — ASPIRIN 81 MG/1
81 TABLET, CHEWABLE ORAL DAILY
Status: DISCONTINUED | OUTPATIENT
Start: 2024-11-05 | End: 2024-11-06 | Stop reason: HOSPADM

## 2024-11-05 RX ORDER — LABETALOL HYDROCHLORIDE 5 MG/ML
10-20 INJECTION, SOLUTION INTRAVENOUS EVERY 10 MIN PRN
Status: DISCONTINUED | OUTPATIENT
Start: 2024-11-05 | End: 2024-11-06 | Stop reason: HOSPADM

## 2024-11-05 RX ORDER — ACETAMINOPHEN 325 MG/1
650 TABLET ORAL EVERY 4 HOURS PRN
Status: DISCONTINUED | OUTPATIENT
Start: 2024-11-05 | End: 2024-11-06 | Stop reason: HOSPADM

## 2024-11-05 RX ORDER — BRIMONIDINE TARTRATE 2 MG/ML
1 SOLUTION/ DROPS OPHTHALMIC 3 TIMES DAILY
Status: DISCONTINUED | OUTPATIENT
Start: 2024-11-05 | End: 2024-11-06 | Stop reason: HOSPADM

## 2024-11-05 RX ORDER — ONDANSETRON 2 MG/ML
4 INJECTION INTRAMUSCULAR; INTRAVENOUS EVERY 6 HOURS PRN
Status: DISCONTINUED | OUTPATIENT
Start: 2024-11-05 | End: 2024-11-06 | Stop reason: HOSPADM

## 2024-11-05 RX ORDER — ACETAMINOPHEN 650 MG/1
650 SUPPOSITORY RECTAL EVERY 4 HOURS PRN
Status: DISCONTINUED | OUTPATIENT
Start: 2024-11-05 | End: 2024-11-06 | Stop reason: HOSPADM

## 2024-11-05 RX ORDER — VITAMIN B COMPLEX
2000 TABLET ORAL AT BEDTIME
Status: DISCONTINUED | OUTPATIENT
Start: 2024-11-05 | End: 2024-11-06 | Stop reason: HOSPADM

## 2024-11-05 RX ORDER — ATORVASTATIN CALCIUM 40 MG/1
40 TABLET, FILM COATED ORAL EVERY EVENING
Status: DISCONTINUED | OUTPATIENT
Start: 2024-11-05 | End: 2024-11-06 | Stop reason: HOSPADM

## 2024-11-05 RX ORDER — LIDOCAINE 40 MG/G
CREAM TOPICAL
Status: DISCONTINUED | OUTPATIENT
Start: 2024-11-05 | End: 2024-11-06 | Stop reason: HOSPADM

## 2024-11-05 RX ORDER — DORZOLAMIDE HYDROCHLORIDE AND TIMOLOL MALEATE 20; 5 MG/ML; MG/ML
1 SOLUTION/ DROPS OPHTHALMIC 2 TIMES DAILY
Status: DISCONTINUED | OUTPATIENT
Start: 2024-11-05 | End: 2024-11-06 | Stop reason: HOSPADM

## 2024-11-05 RX ORDER — FLUTICASONE FUROATE AND VILANTEROL 100; 25 UG/1; UG/1
1 POWDER RESPIRATORY (INHALATION) DAILY
Status: DISCONTINUED | OUTPATIENT
Start: 2024-11-05 | End: 2024-11-06 | Stop reason: HOSPADM

## 2024-11-05 RX ORDER — ACETAMINOPHEN 325 MG/10.15ML
650 LIQUID ORAL EVERY 4 HOURS PRN
Status: DISCONTINUED | OUTPATIENT
Start: 2024-11-05 | End: 2024-11-06 | Stop reason: HOSPADM

## 2024-11-05 RX ORDER — GADOBUTROL 604.72 MG/ML
3 INJECTION INTRAVENOUS ONCE
Status: COMPLETED | OUTPATIENT
Start: 2024-11-05 | End: 2024-11-05

## 2024-11-05 RX ORDER — ASPIRIN 81 MG/1
81 TABLET ORAL DAILY
Status: DISCONTINUED | OUTPATIENT
Start: 2024-11-05 | End: 2024-11-06 | Stop reason: HOSPADM

## 2024-11-05 RX ORDER — LISINOPRIL 20 MG/1
20 TABLET ORAL AT BEDTIME
Status: DISCONTINUED | OUTPATIENT
Start: 2024-11-05 | End: 2024-11-06

## 2024-11-05 RX ORDER — HYDRALAZINE HYDROCHLORIDE 20 MG/ML
10-20 INJECTION INTRAMUSCULAR; INTRAVENOUS
Status: DISCONTINUED | OUTPATIENT
Start: 2024-11-05 | End: 2024-11-06 | Stop reason: HOSPADM

## 2024-11-05 RX ORDER — ONDANSETRON 4 MG/1
4 TABLET, ORALLY DISINTEGRATING ORAL EVERY 6 HOURS PRN
Status: DISCONTINUED | OUTPATIENT
Start: 2024-11-05 | End: 2024-11-06 | Stop reason: HOSPADM

## 2024-11-05 RX ADMIN — ASPIRIN 81 MG: 81 TABLET, COATED ORAL at 01:29

## 2024-11-05 RX ADMIN — DORZOLAMIDE HYDROCHLORIDE TIMOLOL MALEATE 1 DROP: 20; 5 SOLUTION/ DROPS OPHTHALMIC at 20:02

## 2024-11-05 RX ADMIN — ATORVASTATIN CALCIUM 40 MG: 40 TABLET, FILM COATED ORAL at 19:57

## 2024-11-05 RX ADMIN — PERFLUTREN 10 ML: 6.52 INJECTION, SUSPENSION INTRAVENOUS at 12:03

## 2024-11-05 RX ADMIN — FLUTICASONE FUROATE AND VILANTEROL TRIFENATATE 1 PUFF: 100; 25 POWDER RESPIRATORY (INHALATION) at 09:52

## 2024-11-05 RX ADMIN — HYDROMORPHONE HYDROCHLORIDE 0.2 MG: 0.2 INJECTION, SOLUTION INTRAMUSCULAR; INTRAVENOUS; SUBCUTANEOUS at 00:53

## 2024-11-05 RX ADMIN — Medication 2000 UNITS: at 01:29

## 2024-11-05 RX ADMIN — BRIMONIDINE TARTRATE 1 DROP: 2 SOLUTION/ DROPS OPHTHALMIC at 21:51

## 2024-11-05 RX ADMIN — BIMATOPROST 1 DROP: 0.1 SOLUTION/ DROPS OPHTHALMIC at 21:51

## 2024-11-05 RX ADMIN — GADOBUTROL 3 ML: 604.72 INJECTION INTRAVENOUS at 00:48

## 2024-11-05 RX ADMIN — DORZOLAMIDE HYDROCHLORIDE TIMOLOL MALEATE 1 DROP: 20; 5 SOLUTION/ DROPS OPHTHALMIC at 09:52

## 2024-11-05 RX ADMIN — BRIMONIDINE TARTRATE 1 DROP: 2 SOLUTION/ DROPS OPHTHALMIC at 15:49

## 2024-11-05 RX ADMIN — Medication 2000 UNITS: at 21:51

## 2024-11-05 RX ADMIN — BRIMONIDINE TARTRATE 1 DROP: 2 SOLUTION/ DROPS OPHTHALMIC at 09:52

## 2024-11-05 ASSESSMENT — ACTIVITIES OF DAILY LIVING (ADL)
ADLS_ACUITY_SCORE: 0
ADLS_ACUITY_SCORE: 0
DEPENDENT_IADLS:: MEAL PREPARATION;MONEY MANAGEMENT;TRANSPORTATION
ADLS_ACUITY_SCORE: 0

## 2024-11-05 NOTE — CONSULTS
Care Management Initial Consult    General Information  Assessment completed with:  Chari niece  Type of CM/SW Visit: Initial Assessment    Primary Care Provider verified and updated as needed: Yes   Readmission within the last 30 days: no previous admission in last 30 days      Reason for Consult: discharge planning  Advance Care Planning: Advance Care Planning Reviewed: other (see comments), no concerns identified (no HCD)          Communication Assessment  Patient's communication style: spoken language (English or Bilingual)             Cognitive  Cognitive/Neuro/Behavioral: .WDL except  Level of Consciousness: alert  Arousal Level: opens eyes spontaneously  Orientation: disoriented to, place, time, situation  Mood/Behavior: calm, cooperative  Best Language: 0 - No aphasia  Speech: clear, spontaneous    Living Environment:   People in home: facility resident     Current living Arrangements: independent living facility      Able to return to prior arrangements:         Family/Social Support:  Care provided by: self, other (see comments) (family)  Provides care for: no one, unable/limited ability to care for self  Marital Status:   Support system: Sibling(s), Other (specify) (niece)          Description of Support System: Supportive    Support Assessment: Adequate family and caregiver support    Current Resources:   Patient receiving home care services: No        Community Resources: None  Equipment currently used at home: none  Supplies currently used at home: None    Employment/Financial:  Employment Status: retired        Financial Concerns: none           Does the patient's insurance plan have a 3 day qualifying hospital stay waiver?  No    Lifestyle & Psychosocial Needs:  Social Drivers of Health     Food Insecurity: No Food Insecurity (9/17/2024)    [facility]    Food Insecurity     Do you worry your food will run out before you are able to buy  more?: 1   Depression: Not at risk (9/17/2024)    [facility]    PHQ-2     PHQ-2 TOTAL SCORE: 0   Housing Stability: Low Risk  (9/17/2024)    Received from [facility]    Housing Stability     What is your housing situation today?: 1   Tobacco Use: Medium Risk (9/17/2024)    Received from [facility]    Patient History     Smoking Tobacco Use: Former     Smokeless Tobacco Use: Never     Passive Exposure: Not on file   Financial Resource Strain: Low Risk  (9/17/2024)    Received from [facility]    Financial Resource Strain     Difficulty of Paying Living Expenses: 3     Difficulty of Paying Living Expenses: Not on file   Alcohol Use: Not on file   Transportation Needs: No Transportation Needs (9/17/2024)    Received from [facility]    Transportation Needs     Does lack of transportation keep you from medical appointments?: 1     Does lack of transportation keep you from work, meetings or getting things that you need?: 1   Physical Activity: Not on file   Interpersonal Safety: Not on file   Stress: Not on file   Social Connections: Socially Integrated (9/17/2024)    [facility]    Social Connections     Do you often feel lonely or isolated from those around you?: 0   Health Literacy: Not on file       Functional Status:  Prior to admission patient needed assistance:   Dependent ADLs:: Independent  Dependent IADLs:: Meal Preparation, Money Management, Transportation       Mental Health Status:  Mental Health Status: No Current Concerns       Chemical Dependency Status:  Chemical Dependency Status: No Current Concerns             Values/Beliefs:  Spiritual, Cultural Beliefs, Orthodox Practices, Values that affect care: no               Discussed  Partnership in Safe Discharge Planning  document  with patient/family: No    Additional Information:  CM consult for discharge planning.  Per chart review, patient with possible cognitive impairment was admitted due to central retinal artery occlusion.  Contacted patient's niece Chari.  Niece stated pt has dementia and currently lives in [facility]  Zandra and her parents (patient's brother and sister in law) provide support.  Pt is able to take care of herself.  Family sets up meds and patient has been reliable taking them.  Chari stated her mother stops in to check on her every couple of days.  Pt has an emergency pull cord in the bathroom and has a button she pushes each morning to indicate to facility she is ok.  Pt receives meals in the dining room.      Next Steps: Continue to follow for discharge planning, HHC vs TCU and send referrals as appropriate.  PT recommending 24/7 assist if returns home.    Addendum @ 8512:  HHC referral sent for RN, PT and OT.  Per RN, patient has been ambulating with a walker.  May need walker at discharge.      Kay Welch RN, BSN, PHN  Inpatient Care Coordination  [facility]  Phone: [phone]

## 2024-11-05 NOTE — ED NOTES
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 She has been experiencing monocular blurry vision in her left eye for over two and half weeks, for which she was visiting retina specialist clinic today. She was referred to ED for the concerns of ongoing stroke. She denies headache, new vision changes, numbness or tingling of arms or legs, diffculty speaking, abdominal pain, fever, nausea, vomiting or diarreha. She denies past history of stroke and denies blood thinner use. She notes 15 years of smoking history which she quit 15 years ago. Of note, she is not using oxygen at home. Hx of dementia.     Focused Assessment:    Pt A&Ox4. No cough or SOB. Breathing rate, rhythm and SPO2 WDL. Denies chest pain. HR WDL. States monocular eye blurriness in left eye for 2 weeks.     Labs Ordered and Resulted from Time of ED Arrival to Time of ED Departure   BASIC METABOLIC PANEL - Abnormal       Result Value    Sodium 137      Potassium 3.9      Chloride 99      Carbon Dioxide (CO2) 26      Anion Gap 12      Urea Nitrogen 10.5      Creatinine 0.56      GFR Estimate >90      Calcium 9.1      Glucose 108 (*)    TROPONIN T, HIGH SENSITIVITY - Abnormal    Troponin T, High Sensitivity 16 (*)    INR - Normal    INR 1.14     PARTIAL THROMBOPLASTIN TIME - Normal    aPTT 30     CBC WITH PLATELETS AND DIFFERENTIAL    WBC Count 9.4      RBC Count 4.92      Hemoglobin 14.5      Hematocrit 44.4      MCV 90      MCH 29.5      MCHC 32.7      RDW 13.8      Platelet Count 317      % Neutrophils 61      % Lymphocytes 25      % Monocytes 9      % Eosinophils 4      %  Basophils 1      % Immature Granulocytes 0      NRBCs per 100 WBC 0      Absolute Neutrophils 5.8      Absolute Lymphocytes 2.3      Absolute Monocytes 0.8      Absolute Eosinophils 0.4      Absolute Basophils 0.1      Absolute Immature Granulocytes 0.0      Absolute NRBCs 0.0     TROPONIN T, HIGH SENSITIVITY       CT Head w/o Contrast    (Results Pending)   CTA Head Neck with Contrast    (Results Pending)         Treatments and/or interventions provided:  Medications   LORazepam (ATIVAN) tablet 1 mg (1 mg Oral $Given 11/4/24 3232)       Patient's response to treatments and/or interventions:  Patient remains stable.     To be done/followed up on inpatient unit:   See any in-patient orders. Neurology follow up.     Does this patient have any cognitive concerns?: Forgetful    Activity level - Baseline/Home:    Independent    Activity Level - Current:    Independent    Patient's Preferred language: English     Needed?: No    Isolation: None  Infection: Not Applicable  Patient tested for COVID 19 prior to admission: NO    Bariatric?: No    Vital Signs:   Vitals:    11/04/24 1732 11/04/24 1738 11/04/24 1753 11/04/24 1800   BP: 123/77   109/65   Pulse: 92 87 97 92   Resp:    20   Temp:       TempSrc:       SpO2:   91%    Weight:       Height:           Cardiac Rhythm:Cardiac Rhythm: Normal sinus rhythm    Was the PSS-3 completed:   Yes  What interventions are required if any?                 Family Comments: Niece at bedside.    OBS brochure/video discussed/provided to patient/family: No              Name of person given brochure if not patient: N/A              Relationship to patient: N/A    For the majority of the shift this patient's behavior was Green.  Behavioral interventions performed were N/A.    ED NURSE PHONE NUMBER: [phone]

## 2024-11-05 NOTE — CONSULTS
"  [facility]    Stroke Telephone Note    I was called by Bon Kerns on 11/04/24 regarding patient Berenice Krause. The patient is a 79 year old female hypertension, hyperlipidemia who was seen by retinal specialist and diagnosed with left eye CRAO after 2.5 weeks of monocular blurry vision on the left eye.  She was sent to local ER for evaluation.  No other neurological focal deficits noted during local evaluation  And MRI of the brain could not be completed due to unable to lie flat for back pain.  Vitals  BP: 121/62   Pulse: 87   Resp: 21   Temp: 98.2  F (36.8  C)   Weight: 35.8 kg (79 lb)    Imaging Findings  CT head: No bleeding  CTA head/neck: No large vessel occlusion    Impression    Left eye blurry vision/left CRAO    Recommendations  - Use orderset: \"Ischemic Stroke Routine Admission\" or \"Ischemic Stroke No Thrombolytics/No Thrombectomy ICU Admission\"  - Place Neurology IP Stroke Consult order   - Daily aspirin 81 mg for secondary stroke prevention  - Statin: Atorvastatin 40 mg daily  - MRI Brain with and without contrast if possible  - Telemetry, EKG  - Bedside Glucose Monitoring  - A1c, Lipid Panel, Troponin x 3  - PT/OT/SLP  - Stroke Education  - Euthermia, Euglycemia  -TTE, hemoglobin A1c, LDL  -Will follow    My recommendations are based on the information provided over the phone by Berenice Krause's in-person providers. They are not intended to replace the clinical judgment of her in-person providers. I was not requested to personally see or examine the patient at this time.     Mahendra Broderick MD  Vascular Neurology    To page me or covering stroke neurology team member, click here: AMCOM  Choose \"On Call\" tab at top, then select \"NEUROLOGY/ALL SITES\" from middle drop-down box, press Enter, then look for \"stroke\" or \"telestroke\" for your site.   "

## 2024-11-05 NOTE — PLAN OF CARE
SLP: Orders received. Chart reviewed and discussed with care team. SLP not indicated due to no skilled needs. Patient tolerating a regular diet per self and her nurse. Speech/language is intact during a conversation. She continues to have visual deficits. Defer discharge recommendations to the medical team. Will complete orders.

## 2024-11-05 NOTE — PROGRESS NOTES
RECEIVING UNIT ED HANDOFF REVIEW    ED Nurse Handoff Report was reviewed by: Isamar Newby RN on November 4, 2024 at 11:13 PM

## 2024-11-05 NOTE — H&P
"[facility]    History and Physical - Hospitalist Service       Date of Admission:  11/4/2024    Assessment & Plan      Berenice Krause is a 79 year old female admitted on 11/4/2024. She presents with blurry vision    Note: pt is very poor historian.     Central retinal artery occulsion  Has had 2.5 weeks of L eye blurry vision. Saw retinal specialist who dx L eye central retinal artery occlusion. Denies other neuro sx but difficult hx. In ED AFVSS. Troponin 16->16. CT/ CTA head w/o acute process.   - telemetry   - q4 neuro checks  - ASA 81 mg daily   - atorvastatin 40 mg daily   - neuro stroke consult  - MRI brain if possible  - A1c, lipids, troponins   - echo  - PT/OT/SLP    HTN  HLD  - hold lisinopril for permissive hypertension   - resume statin as above    COPD  - resume advair discus  - duonebs prn available    Possible cognitive impairment  Noted per chart review, forgetful. Spoke w/ niece by phone, states pt has dementia. Pt's brother and niece are primary decision makers  - fall precautions  - SW consult to discuss Health care agent    [place]  - resume eye gtts         Diet:  Regular  DVT Prophylaxis: Pneumatic Compression Devices  Herbert Catheter: Not present  Lines: None     Cardiac Monitoring: None  Code Status:  Full    Clinically Significant Risk Factors Present on Admission                 # Drug Induced Platelet Defect: home medication list includes an antiplatelet medication   # Hypertension: Noted on problem list           # Cachexia: Estimated body mass index is 14.45 kg/m  as calculated from the following:    Height as of this encounter: 1.575 m (5' 2\").    Weight as of this encounter: 35.8 kg (79 lb).              Disposition Plan     Medically Ready for Discharge: Anticipated in 2-4 Days           Jack Herrera MD  Hospitalist Service  [facility]  Securely message with [employer] (more info)  Text page via [employer] Paging/Directory "     ______________________________________________________________________    Chief Complaint   L vision loss    History is obtained from the patient, electronic health record, and emergency department physician    History of Present Illness   Berenice Krause is a 79 year old female who presents with left vision loss.  Patient is a very poor historian secondary to cognitive impairment so history is limited.  Reportedly about 2 to 2-1/2 weeks ago patient had loss of vision in her left eye.  Today she went to see an ophthalmologist who saw evidence of central retinal artery occlusion.  They advised her to come to the emergency department.  Here she notes left-sided vision loss.  She states her right eye is fine.  She denies other neurologic changes otherwise difficult to obtain history.  She denies chest pain or shortness of breath currently.  No other complaints.      Past Medical History    Past Medical History:   Diagnosis Date    COPD (chronic obstructive pulmonary disease) (H)     Glaucoma     HTN (hypertension)     Malnutrition (H)        Past Surgical History   No past surgical history on file.    Prior to Admission Medications   Prior to Admission Medications   Prescriptions Last Dose Informant Patient Reported? Taking?   ADVAIR DISKUS 250-50 MCG/DOSE inhaler 11/3/2024  No Yes   Sig: INHALE ONE PUFF BY MOUTH TWICE A DAY   ASPIRIN PO   Yes Yes   Sig: Take 325 mg by mouth as needed for moderate pain.   atorvastatin (LIPITOR) 10 MG tablet 11/3/2024  No Yes   Sig: Take 1 tablet (10 mg) by mouth daily   Patient taking differently: Take 10 mg by mouth at bedtime.   bimatoprost (LUMIGAN) 0.01 % SOLN 11/3/2024 Self Yes Yes   Sig: Place 1 drop into both eyes At Bedtime   brimonidine (ALPHAGAN P) 0.1 % ophthalmic solution  Self Yes Yes   Sig: Place 1 drop into both eyes 2 times daily   cholecalciferol 50 MCG (2000 UT) CAPS 11/3/2024  Yes Yes   Sig: Take 2,000 Units by mouth at bedtime.   dorzolamide-timolol (COSOPT) 2-0.5  % ophthalmic solution  Self Yes Yes   Sig: Place 1 drop into both eyes 2 times daily   lisinopril (ZESTRIL) 20 MG tablet 11/3/2024  No Yes   Sig: Take 1 tablet (20 mg) by mouth daily   Patient taking differently: Take 20 mg by mouth at bedtime.      Facility-Administered Medications: None        Review of Systems    The 10 point Review of Systems is negative other than noted in the HPI or here.     Social History   I have reviewed this patient's social history and updated it with pertinent information if needed.  Social History     Tobacco Use    Smoking status: Former    Smokeless tobacco: Former   Substance Use Topics    Alcohol use: No    Drug use: No        Physical Exam   Vital Signs: Temp: 98.2  F (36.8  C) Temp src: Tympanic BP: 121/62 Pulse: 87   Resp: 21 SpO2: 91 % O2 Device: None (Room air)    Weight: 79 lbs 0 oz    General Appearance: Alert, pleasant, no distress  Respiratory: clear bilaterally  Cardiovascular: RRR, no murmur. No edema  GI: soft, nt/nd  Skin: no rashes or lesions grossly    Other: Marked kyphosis. L vision loss. DIANA     Medical Decision Making       65 MINUTES SPENT BY ME on the date of service doing chart review, history, exam, documentation & further activities per the note.      Data   ------------------------- PAST 24 HR DATA REVIEWED -----------------------------------------------    I have personally reviewed the following data over the past 24 hrs:    9.4  \   14.5   / 317     137 99 10.5 /  108 (H)   3.9 26 0.56 \     Trop: 16 (H) BNP: N/A     INR:  1.14 PTT:  30   D-dimer:  N/A Fibrinogen:  N/A       Imaging results reviewed over the past 24 hrs:   Recent Results (from the past 24 hours)   CTA Head Neck with Contrast    Narrative    EXAM: CTA HEAD NECK W CONTRAST  LOCATION: [facility]  DATE: 11/4/2024    INDICATION: Left eye central retinal artery occlusion, concern for CVA. Visions change symptoms, blurred vision to left monocular vision for 3  weeks  COMPARISON: None.  CONTRAST: 67mL Isovue 370  TECHNIQUE: Head and neck CT angiogram with IV contrast. Axial helical CT images of the head and neck vessels obtained during the arterial phase of intravenous contrast administration. Axial 2D reconstructed images and multiplanar 3D MIP reconstructed   images of the head and neck vessels were performed by the technologist. Dose reduction techniques were used. All stenosis measurements made according to NASCET criteria unless otherwise specified.    FINDINGS:   HEAD CTA:  ANTERIOR CIRCULATION: No stenosis/occlusion, aneurysm, or high flow vascular malformation. Standard [place] anatomy.    POSTERIOR CIRCULATION: No stenosis/occlusion, aneurysm, or high flow vascular malformation. Dominant left and smaller right vertebral artery contribute to a normal basilar artery.     DURAL VENOUS SINUSES: Expected enhancement of the major dural venous sinuses.    NECK CTA:  RIGHT CAROTID: Less than 50% narrowing.    LEFT CAROTID: Less than 50% narrowing.    VERTEBRAL ARTERIES: No focal stenosis or dissection. Dominant left and smaller right vertebral arteries.    AORTIC ARCH: Classic aortic arch anatomy with no significant stenosis at the origin of the great vessels.    NONVASCULAR STRUCTURES: Emphysema      Impression    IMPRESSION:     HEAD CTA:   1.  Normal CTA [place].    NECK CTA:  1.  No definite hemodynamically significant narrowing throughout major neck vessels.     CT Head w/o Contrast    Narrative    EXAM: CT HEAD W/O CONTRAST  LOCATION: [facility]  DATE: 11/4/2024    INDICATION: Left eye central retinal artery occlusion, concern for CVA. Visions change symptoms, blurred vision to left monocular vision for 3 weeks  COMPARISON: None.  TECHNIQUE: Routine CT Head without IV contrast. Multiplanar reformats. Dose reduction techniques were used.    FINDINGS:  INTRACRANIAL CONTENTS: No intracranial hemorrhage, extraaxial collection,  or mass effect.  No CT evidence of acute infarct. Moderate presumed chronic small vessel ischemic changes. Moderate generalized volume loss. No hydrocephalus.     VISUALIZED ORBITS/SINUSES/MASTOIDS: No intraorbital abnormality. No paranasal sinus mucosal disease. No middle ear or mastoid effusion.    BONES/SOFT TISSUES: Multiple patchy lucencies throughout skull, nonspecific; could be due to aggressive osteopenia however underlying lesions cannot be excluded.      Impression    IMPRESSION:  1.  No acute intracranial process.

## 2024-11-05 NOTE — PLAN OF CARE
Isamar Newby, RN on 11/5/2024 at 6:23 AM    Reason for Admission: L eye vision loss  Cognitive/Mentation: A/Ox 2, disorientated to time and situation  Neuros/CMS: Stable w/ L complete vision loss, upper extremities 4/5, lower extremities 3/5. Forgetful (baseline dementia)  VS: VSS on RA.   Tele: NSR.  GI/: Continent/ incontinent  Pulmonary: LS clear.  Pain: denies.   Drains/Lines: PIVs SL  Skin: pale, scattered bruising  Activity: Assist x 1 with GBW.  Diet: reg with thin liquids. Takes pills whole.   Therapies recs: pending  Discharge: pending  Aggression Stoplight Tool: green

## 2024-11-05 NOTE — PLAN OF CARE
Goal Outcome Evaluation:       Reason for Admission: Central retinal artery occlusion    Cognitive/Mentation: A/Ox 1  Neuros/CMS: Intact ex baseline confusion and L eye vision loss. NIH 2  VS: WDL on RA.  Weaned O2 to keep O2 > 88%.   Tele: NSR.  /GI: Continent. Last BM 11/5.   Pulmonary: LS diminshed.  Pain: denies.     Drains/Lines: PIV saline locked  Skin: scattered bruises, otherwise intact  Activity: Assist x SBA with GB and walker.  Diet: regular with thin liquids. Takes pills whole.     Therapies recs: return to assisted living with home PT/OT  Discharge: pending    Aggression Stoplight Tool: green    End of shift summary: Neuro exam stable.  Confused, but baseline per pt's niece.  Plan to discharge back to asst living when work-up is complete.

## 2024-11-05 NOTE — PROGRESS NOTES
11/05/24 1051   Appointment Info   Signing Clinician's Name / Credentials (PT) Jessie Ewing, PT, DPT   Living Environment   People in Home alone   Current Living Arrangements house   Home Accessibility stairs to enter home;stairs within home   Number of Stairs, Main Entrance 2   Stair Railings, Main Entrance railings safe and in good condition   Number of Stairs, Within Home, Primary greater than 10 stairs   Stair Railings, Within Home, Primary railings safe and in good condition   Transportation Anticipated family or friend will provide   Living Environment Comments Pt appears unclear on current living situation reporting she lives at the hospital now but prior lived in 4 level [facility] in [place].   Self-Care   Usual Activity Tolerance good   Current Activity Tolerance fair   Regular Exercise No   Equipment Currently Used at Home none   Fall history within last six months no   Activity/Exercise/Self-Care Comment Pt appears forgetful, reports she is independent with mobility and ADLs at home, unreliable historian.   General Information   Onset of Illness/Injury or Date of Surgery 11/04/24   Referring Physician Jack Herrera MD   Patient/Family Therapy Goals Statement (PT) None stated   Pertinent History of Current Problem (include personal factors and/or comorbidities that impact the POC) Pt is 79 year old female adm on 11/4/24 for further management of central retinal artery occlusion. Pt is a poor historian but reports 2.5 weeks of L eye blurry vision, saw a retinal specialist on 11/4/24 who diagnosed her with left eye central retinal artery occlusion and recommended going to ED. Pt has a PMH of HTN, HLD, COPD, and glaucoma.   Existing Precautions/Restrictions fall   Cognition   Affect/Mental Status (Cognition) WFL   Orientation Status (Cognition) oriented to;person;place   Follows Commands (Cognition) WFL   Cognitive Status Comments Pt is forgetful and a poor historian but follows commands and  participates in functional tasks appropriately, needs frequent safety reminders.   Pain Assessment   Patient Currently in Pain No   Integumentary/Edema   Integumentary/Edema no deficits were identifed   Posture    Posture Forward head position;Protracted shoulders;Kyphosis   Range of Motion (ROM)   Range of Motion ROM is WFL   Strength (Manual Muscle Testing)   Strength (Manual Muscle Testing) strength is WFL   Strength Comments 4/5 strength B LEs at ankles, knees, and hips   Bed Mobility   Comment, (Bed Mobility) SBA   Transfers   Comment, (Transfers) SBA   Gait/Stairs (Locomotion)   Comment, (Gait/Stairs) Min assist, no device   Balance   Balance Comments Pt unsteady in standing, no overt LOB   Clinical Impression   Criteria for Skilled Therapeutic Intervention Yes, treatment indicated   PT Diagnosis (PT) Impaired mobility   Influenced by the following impairments Decreased balance, decreased activity tolerance   Functional limitations due to impairments Decreased independence with functional mobility tasks   Clinical Presentation (PT Evaluation Complexity) stable   Clinical Presentation Rationale Current presentation, [facility]   Clinical Decision Making (Complexity) low complexity   Planned Therapy Interventions (PT) balance training;bed mobility training;gait training;home exercise program;patient/family education;stair training;strengthening;transfer training   Risk & Benefits of therapy have been explained evaluation/treatment results reviewed;care plan/treatment goals reviewed;risks/benefits reviewed;current/potential barriers reviewed;participants voiced agreement with care plan;participants included;patient   PT Total Evaluation Time   PT Eval, Low Complexity Minutes (47523) 10   Physical Therapy Goals   PT Frequency 5x/week   PT Predicted Duration/Target Date for Goal Attainment 11/16/24   PT Goals Bed Mobility;Transfers;Gait;Stairs   PT: Bed Mobility Independent;Supine to/from sit;Rolling   PT: Transfers  Independent;Sit to/from stand;Bed to/from chair   PT: Gait Modified independent;Assistive device;Greater than 200 feet   PT: Stairs Supervision/stand-by assist;3 stairs;Rail on both sides   Interventions   Interventions Quick Adds Gait Training;Therapeutic Activity   Therapeutic Activity   Therapeutic Activities: dynamic activities to improve functional performance Minutes (55607) 10   Symptoms Noted During/After Treatment None   Treatment Detail/Skilled Intervention Pt sitting upright in bed on arrival, is forgetful, oriented to self and year only. Pt agreeable to participate. Pt's glasses were on bedside table and given to patient to use during session. Pt was able to read clock as well as menu when both eyes were open but when closing R eye but only sees shadows. Pt completes supine to sit at EOB with SBA, denies dizziness/lightheadedness with position change. Pt SBA for sit to stand transfer and stands statically with SBA. Education provide on safety during hospital stay. Pt states understanding to call for assistance when needing to use the bathroom, denies needing to use the bathroom at this time. No family present to verify prior level of function or potential discharge planning.   Gait Training   Gait Training Minutes (47613) 10   Symptoms Noted During/After Treatment (Gait Training) fatigue   Treatment Detail/Skilled Intervention Pt ambulates 50'x2 in room without assistive device with min assist. Education provided on how vision impacts balance. Pt states understanding. Pt with no overt LOB but does reach out for objects and is unsteady.   PT Discharge Planning   PT Plan General strengthening, balance training, progress ambulation   PT Discharge Recommendation (DC Rec) Transitional Care Facility;home with assist;home with home care physical therapy   PT Rationale for DC Rec Pt is below baseline level of function, is unsteady with ambulation, would benefit from continued inpatient rehab to further address  deficits and optimize functional independence and safety. If pt were to discharge to home, would need 24 hour supervision and continued PT services. Anticipate pt may benefit from more supportive living environment overall.   PT Brief overview of current status Goals of therapy will be to address safe mobility and make recs for d/c to next level of care. Pt and RN will continue to follow all falls risk precautions as documented by RN staff while hospitalized   Physical Therapy Time and Intention   Timed Code Treatment Minutes 20   Total Session Time (sum of timed and untimed services) 30

## 2024-11-05 NOTE — ED NOTES
Dilaudid pulled for MRI at this time - pt declining it right now, able to lie flat on stretcher at this time. Med given to flying guzmán RN for prn use in MRI.

## 2024-11-05 NOTE — CONSULTS
[facility]    Stroke Consult Note    Reason for Consult: Left retinal artery occlusion    Chief Complaint: Loss of Vision       HPI  Berenice Krause is a 79 year old female with pertinent past medical history of HTN, HLD, COPD, history of memory loss, glaucoma, on PTA Lipitor 10 mg daily.  Not taking ASA.    She was seen by the retinal specialist 11/4/2024 due to left eye blurring/gray vision x 2.5-3 weeks.  She had woken up with the symptoms and have been persistent since.  She had seen her regular eye doctor a few days after symptoms started who referred her to retinal specialist.  Retinal saw concerning for retinal artery occlusion so referred her to the emergency department for stroke evaluation.  CT/CTA unremarkable.    She is a former smoker, quit 15 years ago.  Very rare social alcohol consumption not in the past year.  Denies nonprescription medication/herbal supplements/illicit drug use.  She denies any associated eye/head/jaw pain, scalp tenderness or recent infections,fevers. No night sweats or unintentional weight loss.       Stroke Evaluation Summarized    MRI/Head CT MRI: Negative for acute pathology, mild-moderate chronic microhemorrhages to bilateral cerebral hemispheres, cerebellum, basal ganglia   Intracranial Vasculature CTA head: Negative for acute pathology   Cervical Vasculature CTA neck: Less than 50% bilateral carotid stenosis     Echocardiogram TTE: LV normal, EF 40-45%, RV normal, normal bilateral atria size, no atrial shunt seen, moderate MR mild-moderate AR, moderate valvular aortic stenosis, SR   EKG/Telemetry Sinus rhythm   Minimal voltage criteria for LVH, may be normal variant ( Jah product )   Inferior infarct , age undetermined    Other Testing CRP <3  ESR 13     LDL  11/4/2024: 113 mg/dL   A1C  11/4/2024: 6.4 %   Troponin 11/4/2024: 16 ng/L       Impression/Recommendations   Left retinal artery occlusion, potentially atherosclerotic etiology-has  mild bilateral carotid stenosis less than 50%, does have some aortic arch plaque as well, rule out cardioembolic source, no concern for giant cell arteritis at this time  Chronic microhemorrhages to bilateral cerebral hemispheres, cerebellum, basal ganglia, given associated cognitive decline concern for potential underlying cerebral amyloid angiopathy (CAA)  -Neuro checks and vitals every 4 hours  -ASA 81 mg daily indefinitely  -telemetry, 14 day Zio Patch to be mailed out at discharge if no atrial fibrillation found on telemetry ()  -No driving until cleared by OT/ophthalmology for visual deficits and and neuropsychology with cognitive testing, patient denies that she currently drives  -PT/OT/SPT, Dysphagia screen  -Euthermia, euglycemia, eunatremia  -Stroke Education, reviewed [facility] stroke symptoms     3. Hypertension  -appreciate management per primary team  -Inpatient SBP goal <180   -Outpatient BP goal <130/80, with tighter control if tolerated  -Recommend home blood pressure monitoring twice daily in AM and PM, keep log and bring to medical visits (ordered blood pressure monitor DME)    4. Hyperlipidemia  -Recommend increasing PTA Lipitor from 10 mg daily to 40 mg daily  -Recommend LDL goal 40-70, <40 increases risk of intracranial hemorrhage  -Recommend Mediterranean diet     5. Prediabetes  -appreciate management per primary team  -blood glucose monitoring, long term A1c goal <7%    Discussed with vascular neurology attending, Dr. Islas      Patient Follow-up    - in the next 1 week(s) with PCP  - in 6-8 weeks with general neurology or stroke ENRIQUE ([phone]) (ordered)  -Follow-up with retinal specialist  -Follow-up with neuropsychology for neurocognitive testing (ordered)    Thank you for this consult. No further stroke evaluation is recommended, so we will sign off. Please contact us with any additional questions.    Melania Oakley PA-C  Vascular Neurology    To page me or covering stroke neurology  "team member, click here: [facility]  Choose \"On Call\" tab at top, then select \"NEUROLOGY/ALL SITES\" from middle drop-down box, press Enter, then look for \"stroke\" or \"telestroke\" for your site.  _____________________________________________________    Clinically Significant Risk Factors Present on Admission                 # Drug Induced Platelet Defect: home medication list includes an antiplatelet medication   # Hypertension: Noted on problem list           # Cachexia: Estimated body mass index is 14.45 kg/m  as calculated from the following:    Height as of this encounter: 1.575 m (5' 2\").    Weight as of this encounter: 35.8 kg (79 lb).       # Financial/Environmental Concerns: none            Past Medical History    Past Medical History:   Diagnosis Date    COPD (chronic obstructive pulmonary disease) (H)     Glaucoma     HTN (hypertension)     Malnutrition (H)      Medications   Home Meds  Prior to Admission medications    Medication Sig Start Date End Date Taking? Authorizing Provider   ADVAIR DISKUS 250-50 MCG/DOSE inhaler INHALE ONE PUFF BY MOUTH TWICE A DAY 5/11/22  Yes Di Wynne APRN CNP   ASPIRIN PO Take 325 mg by mouth as needed for moderate pain.   Yes Unknown, Entered By History   atorvastatin (LIPITOR) 10 MG tablet Take 1 tablet (10 mg) by mouth daily  Patient taking differently: Take 10 mg by mouth at bedtime. 2/28/22  Yes Di Wynne APRN CNP   bimatoprost (LUMIGAN) 0.01 % SOLN Place 1 drop into both eyes At Bedtime   Yes Reported, Patient   brimonidine (ALPHAGAN P) 0.1 % ophthalmic solution Place 1 drop into both eyes 2 times daily   Yes Reported, Patient   cholecalciferol 50 MCG (2000 UT) CAPS Take 2,000 Units by mouth at bedtime. 10/9/20  Yes Reported, Patient   dorzolamide-timolol (COSOPT) 2-0.5 % ophthalmic solution Place 1 drop into both eyes 2 times daily   Yes Reported, Patient   lisinopril (ZESTRIL) 20 MG tablet Take 1 tablet (20 mg) by mouth daily  Patient taking " differently: Take 20 mg by mouth at bedtime. 2/28/22  Yes Di Wynne APRN CNP       Scheduled Meds  Current Facility-Administered Medications   Medication Dose Route Frequency Provider Last Rate Last Admin    aspirin EC tablet 81 mg  81 mg Oral Daily Jack Herrera MD   81 mg at 11/05/24 0129    Or    aspirin (ASA) chewable tablet 81 mg  81 mg Oral or NG Tube Daily Jack Herrera MD        atorvastatin (LIPITOR) tablet 40 mg  40 mg Oral QPM Jack Herrera MD        bimatoprost (LUMIGAN) 0.01 % ophthalmic drops 1 drop  1 drop Both Eyes At Bedtime Jack Herrera MD        brimonidine (ALPHAGAN) 0.2 % ophthalmic solution 1 drop  1 drop Both Eyes TID Jack Herrera MD   1 drop at 11/05/24 0952    dorzolamide-timolol (COSOPT) ophthalmic solution 1 drop  1 drop Both Eyes BID Jack Herrera MD   1 drop at 11/05/24 0952    fluticasone-vilanterol (BREO ELLIPTA) 100-25 MCG/ACT inhaler 1 puff  1 puff Inhalation Daily Jack Herrera MD   1 puff at 11/05/24 0952    [Held by provider] lisinopril (ZESTRIL) tablet 20 mg  20 mg Oral At Bedtime Jack Herrera MD        sodium chloride (PF) 0.9% PF flush 3 mL  3 mL Intracatheter Q8H Jack Herrera MD   3 mL at 11/05/24 0953    Vitamin D3 (CHOLECALCIFEROL) tablet 2,000 Units  2,000 Units Oral At Bedtime Jack Herrera MD   2,000 Units at 11/05/24 0129       Infusion Meds  Current Facility-Administered Medications   Medication Dose Route Frequency Provider Last Rate Last Admin       Allergies   No Known Allergies       PHYSICAL EXAMINATION   Temp:  [97.4  F (36.3  C)-98.2  F (36.8  C)] 97.7  F (36.5  C)  Pulse:  [] 84  Resp:  [15-30] 16  BP: ()/(48-82) 103/62  SpO2:  [91 %-100 %] 94 %    General Exam  General:  patient lying in bed without any acute distress    HEENT:  normocephalic/atraumatic  Pulmonary:  no respiratory distress    Neuro Exam  Mental Status:   alert, oriented x 3, follows commands, speech clear and fluent, naming and repetition normal  Cranial Nerves: Near complete loss of vision to left eye with sliver of spared vision to left lower quadrant, PERRL, EOMI with normal smooth pursuit, facial sensation intact and symmetric, facial movements symmetric, hearing not formally tested but intact to conversation, no dysarthria, tongue protrusion midline  Motor:  normal muscle tone and bulk, no abnormal movements, able to move all limbs spontaneously, strength 5/5 throughout upper and lower extremities, no pronator drift  Reflexes:  toes down-going  Sensory:  light touch sensation intact and symmetric throughout upper and lower extremities, no extinction on double simultaneous stimulation   Coordination:  normal finger-to-nose and heel-to-shin bilaterally without dysmetria  Station/Gait:  deferred    Stroke Scales    NIHSS  1a. Level of Consciousness 0-->Alert, keenly responsive   1b. LOC Questions 2-->Answers neither question correctly   1c. LOC Commands 0-->Performs both tasks correctly   2.   Best Gaze 0-->Normal   3.   Visual (S) 1-->Partial hemianopia (Near complete loss of vision to left eye with sliver of spared vision to left lower quadrant)   4.   Facial Palsy 0-->Normal symmetrical movements   5a. Motor Arm, Left 0-->No drift, limb holds 90 (or 45) degrees for full 10 secs   5b. Motor Arm, Right 0-->No drift, limb holds 90 (or 45) degrees for full 10 secs   6a. Motor Leg, Left 0-->No drift, leg holds 30 degree position for full 5 secs   6b. Motor Leg, right 0-->No drift, leg holds 30 degree position for full 5 secs   7.   Limb Ataxia 0-->Absent   8.   Sensory 0-->Normal, no sensory loss   9.   Best Language 0-->No aphasia, normal   10. Dysarthria 0-->Normal   11. Extinction and Inattention  0-->No abnormality   Total 3 (11/05/24 5806)       Imaging  I personally reviewed all imaging; relevant findings per HPI.    Labs Data   CBC  Recent Labs   Lab  "11/05/24  0813 11/04/24  1612   WBC 9.1 9.4   RBC 4.56 4.92   HGB 13.8 14.5   HCT 41.5 44.4    317     Basic Metabolic Panel   Recent Labs   Lab 11/05/24  1245 11/05/24  0834 11/05/24  0813 11/05/24  0114 11/04/24  1612   NA  --   --  138  --  137   POTASSIUM  --   --  4.1  --  3.9   CHLORIDE  --   --  98  --  99   CO2  --   --  26  --  26   BUN  --   --  12.0  --  10.5   CR  --   --  0.51  --  0.56   * 99 109*   < > 108*   RICHARD  --   --  9.0  --  9.1    < > = values in this interval not displayed.     Liver Panel  No results for input(s): \"PROTTOTAL\", \"ALBUMIN\", \"BILITOTAL\", \"ALKPHOS\", \"AST\", \"ALT\", \"BILIDIRECT\" in the last 168 hours.  INR    Recent Labs   Lab Test 11/04/24  1644   INR 1.14           Stroke Consult Data Data   This was a non-emergent, non-telestroke consult.  I have personally spent a total of 70 minutes providing care today, time spent in reviewing medical records and devising the plan as recorded above.     *All or a portion of this note was generated using voice recognition software and may contain transcription errors.  "

## 2024-11-05 NOTE — PHARMACY-CONSULT NOTE
Pharmacy Consult to evaluate for medication related stroke core measures    Berenice Krause, 79 year old female admitted for central retinal artery occlusion on 11/4/2024.    Thrombolytic was not given because of Time from onset contraindications, Clinical contraindications    VTE Prophylaxis SCDs /PCDs placed on 11/5, as appropriate prior to end of hospital day 2.    Antithrombotic: aspirin started on 11/5, as appropriate by end of hospital day 2. Continue antithrombotic therapy on discharge to meet quality measures, unless contraindicated.    Anticoagulation if history of A-fib/flutter: Patient does not have history of A-fib/flutter - anticoagulation not required for medication related stroke core measures.     LDL Cholesterol Calculated   Date Value Ref Range Status   11/04/2024 113 (H) <100 mg/dL Final   05/24/2021 110 (H) <100 mg/dL Final     Comment:     Above desirable:  100-129 mg/dl  Borderline High:  130-159 mg/dL  High:             160-189 mg/dL  Very high:       >189 mg/dl         Patient's home statin, Lipitor (atorvastatin) increased; continue statin on discharge to meet quality measures, unless contraindicated.     Recommendations: None at this time    Thank you for the consult.    Frederic Chaparro [facility] 11/5/2024 12:37 PM

## 2024-11-05 NOTE — PROGRESS NOTES
[facility] [place]  Hospitalist Progress Note    Assessment & Plan   Berenice Krause is a 79 year old female with a PMH of HTN, HLD, COPD, Glaucoma who was admitted on 11/4/2024 for central retinal artery occulusion.      Note: pt is very poor historian.      Central Retinal Artery Occulusion of Left Eye  Has had 2.5 weeks of L eye blurry vision. Saw retinal specialist on 11/4/24 who dx her with  L eye central retinal artery occlusion. They recommended she present to the ED. Denies other neuro sx but difficult hx. In ED AFVSS. Troponin 16->16. CT and CTA head w/o acute process.     - Telemetry   - Q4H neuro checks    - MRI Brain: No acute intracranial process. 2. Generalized brain atrophy and presumed microvascular ischemic changes as detailed above. 3. Mild-to-moderate foci of chronic microhemorrhage in the bilateral cerebral hemispheres, cerebellar hemispheres and basal ganglia of indeterminate etiology but typically associated with chronic hypertension or amyloid angiopathy.   - TTE ordered     - LDL: 113    - Continue ASA 81 mg daily   - Continue Atorvastatin 40 mg daily     - Stroke Neurology consulted   - PT/OT/SLP     Prediabetic   - 11/20214 A1c: 6.4  - Patient states that she has been told this before. She states that she has been told to ignore this given she is only 79lbs.     HTN  HLD  - Hold lisinopril for permissive hypertension   - Resume statin as above     COPD  - Resume advair discus  - Duonebs prn available     Possible cognitive impairment  Noted per chart review, forgetful. Admitting team spoke w/ niece by phone, states pt has dementia. Pt's brother and niece are primary decision makers  - Fall precautions  - SW consult to discuss Health care agent     [place]  - Resume eye gtts     Clinically Significant Risk Factors Present on Admission                 # Drug Induced Platelet Defect: home medication list includes an antiplatelet medication   # Hypertension: Noted on problem  "list           # Cachexia: Estimated body mass index is 14.45 kg/m  as calculated from the following:    Height as of this encounter: 1.575 m (5' 2\").    Weight as of this encounter: 35.8 kg (79 lb).                Diet: Regular Diet Adult  Room Service     DVT Prophylaxis: Pneumatic Compression Devices   Herbert Catheter: Not present  Lines: None     Cardiac Monitoring: ACTIVE order. Indication: Stroke, acute (48 hours)  Code Status: Full Code      Disposition Plan       Expected Discharge Date: 11/06/2024              Entered: Marilee Kim MD 11/05/2024, 11:23 AM     Notes Reviewed: H&P    Family Updated: Spoke to patient's niece at bedside on 11/5/24    Disposition: Could be ready to go tomorrow pending TTE, therapy recommendations and final stroke recommendations       Medically Ready for Discharge: Anticipated Tomorrow        Marilee Kim MD  Hospitalist Service   [facility]  Securely message with the Vocera Web Console (learn more here)         Medical Decision Making       60 MINUTES SPENT BY ME on the date of service doing chart review, history, exam, documentation & further activities per the note.           Interval History     No acute overnight events.    This morning the patient states that she is doing okay. She states that her left eye continues to be blurry but when she wears her glasses her vision a bit better. Voices no other complaints.     -Data reviewed today: I reviewed all new labs and imaging results over the last 24 hours.    Physical Exam   Temp: 97.7  F (36.5  C) Temp src: Oral BP: 103/62 Pulse: 84   Resp: 16 SpO2: 94 % O2 Device: None (Room air) Oxygen Delivery: 2 LPM  Vitals:    11/04/24 1522   Weight: 35.8 kg (79 lb)     Vital Signs with Ranges  Temp:  [97.4  F (36.3  C)-98.2  F (36.8  C)] 97.7  F (36.5  C)  Pulse:  [] 84  Resp:  [15-30] 16  BP: ()/(48-82) 103/62  SpO2:  [91 %-100 %] 94 %  No intake/output data " recorded.      Constitutional: Awake, alert, cooperative, no apparent distress.    HEENT: PERRL, Normocephalic, without obvious abnormality, atraumatic, oral pharynx with moist mucus membranes  Pulmonary: No increased work of breathing, good air exchange, clear to auscultation bilaterally, no crackles or wheezing. Marked kyphosis.  Cardiovascular: Regular rate and rhythm, normal S1 and S2  GI: Normal bowel sounds, soft, non-distended, non-tender.  Skin/Integumen: Visualized skin appeared clear.  Neuro: CN II-XII grossly intact other then left eye blurriness   Psych:  Alert and oriented x 3, very forgetful  Extremities: No lower extremity edema noted    Medications   Current Facility-Administered Medications   Medication Dose Route Frequency Provider Last Rate Last Admin     Current Facility-Administered Medications   Medication Dose Route Frequency Provider Last Rate Last Admin    aspirin EC tablet 81 mg  81 mg Oral Daily Jack Herrera MD   81 mg at 11/05/24 0129    Or    aspirin (ASA) chewable tablet 81 mg  81 mg Oral or NG Tube Daily Jack Herrera MD        atorvastatin (LIPITOR) tablet 40 mg  40 mg Oral QPM Jack Herrera MD        bimatoprost (LUMIGAN) 0.01 % ophthalmic drops 1 drop  1 drop Both Eyes At Bedtime Jack Herrera MD        brimonidine (ALPHAGAN) 0.2 % ophthalmic solution 1 drop  1 drop Both Eyes TID Jack Herrera MD   1 drop at 11/05/24 0952    dorzolamide-timolol (COSOPT) ophthalmic solution 1 drop  1 drop Both Eyes BID Jack Herrera MD   1 drop at 11/05/24 0952    fluticasone-vilanterol (BREO ELLIPTA) 100-25 MCG/ACT inhaler 1 puff  1 puff Inhalation Daily Jack Herrera MD   1 puff at 11/05/24 0952    [Held by provider] lisinopril (ZESTRIL) tablet 20 mg  20 mg Oral At Bedtime Jack Herrera MD        sodium chloride (PF) 0.9% PF flush 3 mL  3 mL Intracatheter Q8H Jack Herrera MD   3 mL at 11/05/24 0953     Vitamin D3 (CHOLECALCIFEROL) tablet 2,000 Units  2,000 Units Oral At Bedtime Jack Herrera MD   2,000 Units at 11/05/24 0129       Data   Recent Labs   Lab 11/05/24  0834 11/05/24  0813 11/05/24  0114 11/04/24  1644 11/04/24  1612   WBC  --  9.1  --   --  9.4   HGB  --  13.8  --   --  14.5   MCV  --  91  --   --  90   PLT  --  328  --   --  317   INR  --   --   --  1.14  --    NA  --  138  --   --  137   POTASSIUM  --  4.1  --   --  3.9   CHLORIDE  --  98  --   --  99   CO2  --  26  --   --  26   BUN  --  12.0  --   --  10.5   CR  --  0.51  --   --  0.56   ANIONGAP  --  14  --   --  12   RICHARD  --  9.0  --   --  9.1   GLC 99 109* 116*  --  108*       Recent Results (from the past 24 hours)   CTA Head Neck with Contrast    Narrative    EXAM: CTA HEAD NECK W CONTRAST  LOCATION: [facility]  DATE: 11/4/2024    INDICATION: Left eye central retinal artery occlusion, concern for CVA. Visions change symptoms, blurred vision to left monocular vision for 3 weeks  COMPARISON: None.  CONTRAST: 67mL Isovue 370  TECHNIQUE: Head and neck CT angiogram with IV contrast. Axial helical CT images of the head and neck vessels obtained during the arterial phase of intravenous contrast administration. Axial 2D reconstructed images and multiplanar 3D MIP reconstructed   images of the head and neck vessels were performed by the technologist. Dose reduction techniques were used. All stenosis measurements made according to NASCET criteria unless otherwise specified.    FINDINGS:   HEAD CTA:  ANTERIOR CIRCULATION: No stenosis/occlusion, aneurysm, or high flow vascular malformation. Standard [place] anatomy.    POSTERIOR CIRCULATION: No stenosis/occlusion, aneurysm, or high flow vascular malformation. Dominant left and smaller right vertebral artery contribute to a normal basilar artery.     DURAL VENOUS SINUSES: Expected enhancement of the major dural venous sinuses.    NECK CTA:  RIGHT CAROTID:  Less than 50% narrowing.    LEFT CAROTID: Less than 50% narrowing.    VERTEBRAL ARTERIES: No focal stenosis or dissection. Dominant left and smaller right vertebral arteries.    AORTIC ARCH: Classic aortic arch anatomy with no significant stenosis at the origin of the great vessels.    NONVASCULAR STRUCTURES: Emphysema      Impression    IMPRESSION:     HEAD CTA:   1.  Normal CTA [place].    NECK CTA:  1.  No definite hemodynamically significant narrowing throughout major neck vessels.     CT Head w/o Contrast    Narrative    EXAM: CT HEAD W/O CONTRAST  LOCATION: [facility]  DATE: 11/4/2024    INDICATION: Left eye central retinal artery occlusion, concern for CVA. Visions change symptoms, blurred vision to left monocular vision for 3 weeks  COMPARISON: None.  TECHNIQUE: Routine CT Head without IV contrast. Multiplanar reformats. Dose reduction techniques were used.    FINDINGS:  INTRACRANIAL CONTENTS: No intracranial hemorrhage, extraaxial collection, or mass effect.  No CT evidence of acute infarct. Moderate presumed chronic small vessel ischemic changes. Moderate generalized volume loss. No hydrocephalus.     VISUALIZED ORBITS/SINUSES/MASTOIDS: No intraorbital abnormality. No paranasal sinus mucosal disease. No middle ear or mastoid effusion.    BONES/SOFT TISSUES: Multiple patchy lucencies throughout skull, nonspecific; could be due to aggressive osteopenia however underlying lesions cannot be excluded.      Impression    IMPRESSION:  1.  No acute intracranial process.   MR Brain w/o & w Contrast    Narrative    EXAM: MR BRAIN W/O and W CONTRAST  LOCATION: [facility]  DATE: 11/5/2024    INDICATION: Central retinal artery occlusion.  COMPARISON: CT/CTA 11/4/2024.  CONTRAST: 3 mL Gadavist.  TECHNIQUE: Routine multiplanar multisequence head MRI without and with intravenous contrast.    FINDINGS:  INTRACRANIAL CONTENTS: No acute or subacute infarct. No  mass, acute hemorrhage, or extra-axial fluid collections. Scattered nonspecific T2/FLAIR hyperintensities within the cerebral white matter most consistent with mild chronic microvascular ischemic   change. Mild generalized cerebral atrophy. No hydrocephalus. Mild-to-moderate scattered foci of hemosiderin blooming in the cerebral hemispheres and cerebellar hemispheres as well as the basal ganglia and thalami. No pathologic contrast enhancement.    SELLA: No abnormality accounting for technique.    OSSEOUS STRUCTURES/SOFT TISSUES: Normal marrow signal. The major intracranial vascular flow voids are maintained. Inflammatory changes of the left temporomandibular joint.    ORBITS: No abnormality accounting for technique.     SINUSES/MASTOIDS: No paranasal sinus mucosal disease. No middle ear or mastoid effusion.       Impression    IMPRESSION:  1.  No acute intracranial process.  2.  Generalized brain atrophy and presumed microvascular ischemic changes as detailed above.  3.  Mild-to-moderate foci of chronic microhemorrhage in the bilateral cerebral hemispheres, cerebellar hemispheres and basal ganglia of indeterminate etiology but typically associated with chronic hypertension or amyloid angiopathy.

## 2024-11-05 NOTE — PHARMACY-ADMISSION MEDICATION HISTORY
Pharmacist Admission Medication History    Admission medication history is complete. The information provided in this note is only as accurate as the sources available at the time of the update.    Information Source(s): Patient, Family member, and CareEverywhere/SureScripts via in-person    Pertinent Information:   -bimatoprost: per Surescripts, last dispensed 5/2/24 for 25-day supply. Family reports patient still uses this.  -per Surescripts, inhaler dispensed as generic fluticasone/salmeterol    Changes made to PTA medication list:  Added: aspirin  Deleted: albuterol  Changed: atorvastatin (daily --> at bedtime), vitamin D (added frequency), lisinopril (daily --> at bedtime)    Allergies reviewed with patient and updates made in EHR: no, already reviewed    Medication History Completed By: Sravanthi Lezama RPH 11/4/2024 8:24 PM    PTA Med List   Medication Sig Note Last Dose/Taking    ADVAIR DISKUS 250-50 MCG/DOSE inhaler INHALE ONE PUFF BY MOUTH TWICE A DAY 11/4/2024: Fluticasone/salmeterol 11/3/2024    ASPIRIN PO Take 325 mg by mouth as needed for moderate pain.  Taking As Needed    atorvastatin (LIPITOR) 10 MG tablet Take 1 tablet (10 mg) by mouth daily (Patient taking differently: Take 10 mg by mouth at bedtime.)  11/3/2024    bimatoprost (LUMIGAN) 0.01 % SOLN Place 1 drop into both eyes At Bedtime  11/3/2024    brimonidine (ALPHAGAN P) 0.1 % ophthalmic solution Place 1 drop into both eyes 2 times daily  Taking    cholecalciferol 50 MCG (2000 UT) CAPS Take 2,000 Units by mouth at bedtime.  11/3/2024    dorzolamide-timolol (COSOPT) 2-0.5 % ophthalmic solution Place 1 drop into both eyes 2 times daily  Taking    lisinopril (ZESTRIL) 20 MG tablet Take 1 tablet (20 mg) by mouth daily (Patient taking differently: Take 20 mg by mouth at bedtime.)  11/3/2024

## 2024-11-06 ENCOUNTER — APPOINTMENT (OUTPATIENT)
Dept: OCCUPATIONAL THERAPY | Facility: CLINIC | Age: 79
DRG: 125 | End: 2024-11-06
Attending: INTERNAL MEDICINE
Payer: MEDICARE

## 2024-11-06 ENCOUNTER — APPOINTMENT (OUTPATIENT)
Dept: PHYSICAL THERAPY | Facility: CLINIC | Age: 79
DRG: 125 | End: 2024-11-06
Payer: MEDICARE

## 2024-11-06 VITALS
SYSTOLIC BLOOD PRESSURE: 125 MMHG | HEIGHT: 62 IN | TEMPERATURE: 97.8 F | WEIGHT: 79 LBS | DIASTOLIC BLOOD PRESSURE: 66 MMHG | OXYGEN SATURATION: 92 % | BODY MASS INDEX: 14.54 KG/M2 | RESPIRATION RATE: 16 BRPM | HEART RATE: 82 BPM

## 2024-11-06 LAB
GLUCOSE BLDC GLUCOMTR-MCNC: 100 MG/DL (ref 70–99)
GLUCOSE BLDC GLUCOMTR-MCNC: 108 MG/DL (ref 70–99)

## 2024-11-06 PROCEDURE — 97530 THERAPEUTIC ACTIVITIES: CPT | Mod: GP

## 2024-11-06 PROCEDURE — 97165 OT EVAL LOW COMPLEX 30 MIN: CPT | Mod: GO

## 2024-11-06 PROCEDURE — 97535 SELF CARE MNGMENT TRAINING: CPT | Mod: GO

## 2024-11-06 PROCEDURE — 99239 HOSP IP/OBS DSCHRG MGMT >30: CPT | Performed by: STUDENT IN AN ORGANIZED HEALTH CARE EDUCATION/TRAINING PROGRAM

## 2024-11-06 PROCEDURE — 250N000013 HC RX MED GY IP 250 OP 250 PS 637: Performed by: INTERNAL MEDICINE

## 2024-11-06 PROCEDURE — 250N000013 HC RX MED GY IP 250 OP 250 PS 637: Performed by: STUDENT IN AN ORGANIZED HEALTH CARE EDUCATION/TRAINING PROGRAM

## 2024-11-06 PROCEDURE — 97116 GAIT TRAINING THERAPY: CPT | Mod: GP

## 2024-11-06 RX ORDER — ASPIRIN 81 MG/1
81 TABLET ORAL DAILY
Qty: 30 TABLET | Refills: 3 | Status: SHIPPED | OUTPATIENT
Start: 2024-11-07

## 2024-11-06 RX ORDER — ATORVASTATIN CALCIUM 40 MG/1
40 TABLET, FILM COATED ORAL EVERY EVENING
Qty: 30 TABLET | Refills: 0 | Status: SHIPPED | OUTPATIENT
Start: 2024-11-06

## 2024-11-06 RX ORDER — LISINOPRIL 20 MG/1
20 TABLET ORAL DAILY
Status: DISCONTINUED | OUTPATIENT
Start: 2024-11-06 | End: 2024-11-06 | Stop reason: HOSPADM

## 2024-11-06 RX ADMIN — ASPIRIN 81 MG: 81 TABLET, COATED ORAL at 08:48

## 2024-11-06 RX ADMIN — FLUTICASONE FUROATE AND VILANTEROL TRIFENATATE 1 PUFF: 100; 25 POWDER RESPIRATORY (INHALATION) at 08:48

## 2024-11-06 RX ADMIN — LISINOPRIL 20 MG: 20 TABLET ORAL at 08:48

## 2024-11-06 RX ADMIN — DORZOLAMIDE HYDROCHLORIDE TIMOLOL MALEATE 1 DROP: 20; 5 SOLUTION/ DROPS OPHTHALMIC at 08:48

## 2024-11-06 RX ADMIN — BRIMONIDINE TARTRATE 1 DROP: 2 SOLUTION/ DROPS OPHTHALMIC at 08:48

## 2024-11-06 ASSESSMENT — ACTIVITIES OF DAILY LIVING (ADL)
ADLS_ACUITY_SCORE: 0

## 2024-11-06 NOTE — DISCHARGE SUMMARY
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      Note: pt is very poor historian.      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 Saw retinal specialist on 11/4/24 who dx her with  L eye central retinal artery occlusion. They recommended she present to the ED. Denies other neuro sx but difficult hx. In ED AFVSS. Troponin 16->16. CT and CTA head w/o acute process.      - Telemetry   - Q4H neuro checks     - MRI Brain: No acute intracranial process. 2. Generalized brain atrophy and presumed microvascular ischemic changes as detailed above. 3. Mild-to-moderate foci of chronic microhemorrhage in the bilateral cerebral hemispheres, cerebellar hemispheres and basal ganglia of indeterminate etiology but typically associated with chronic hypertension or amyloid angiopathy.     - TTE: EF 40-45%, inferior, inferior lateral akinesis    - Patient does not have any chest pain, no arrhythmias on tele, no signs of volume overload   - EKG with no acute ischemic changes   - Troponin: 16 > 16, Stable    - Patient and family did not want to stay another night as patient was getting more  impulsive and not sleeping well given she was in a new environment which was not helping given her baseline cognitive  impairment. Given that patient was stable (no chest pain, no arrhythmias and no signs of fluid overload, troponin stable, no cute findings on EKG) along with we do not have an echo to compare too to clarify how old this finding is, it was felt that patient could follow up outpatient. An outpatient referral was placed.      - LDL: 113     - Continue ASA 81 mg daily   - Continue Atorvastatin 40 mg daily      - Stroke Neurology consulted    - ASA 81 mg daily indefinitely   - Recommend increasing PTA Lipitor from 10 mg daily to 40 mg daily   - Telemetry, 14 day Zio Patch to be mailed out at discharge if no atrial fibrillation found on telemetry   - No driving until cleared by OT/ophthalmology for visual deficits and and neuropsychology with cognitive testing, patient denies that she currently drives  - Follow up in 6-8 weeks with general neurology or stroke ENRIQUE ([phone]) (ordered)  - Follow-up with retinal specialist  - Follow-up with neuropsychology for neurocognitive testing (ordered)    - PT/OT/SLP   - PT/OT recommending 24 hour supervision until home care can assess patient    - Spent time discussing this with patients niece. Explained therapy recommendations. Did did discuss TCU. Ultimately, Chari felt that getting patient back home would be the best option. She will arrange for patient to have 24 hour supervision until home care can assess her. She will touch base with home care and is planning on talking to the patient's facility about increased cares.     - SW/CM consulted   - Home care referral placed      Prediabetic   - 11/20214 A1c: 6.4  - Patient states that she has been told this before. She states that she has been told to ignore this given she is only 79lbs.      HTN  HLD  - Resume lisinopril   - Resume statin as above     COPD  - Resume advair discus  - Duonebs prn available     Possible cognitive impairment  Noted per chart review, forgetful. Admitting team spoke w/ niece by phone, states pt  "has dementia. Pt's brother and niece are primary decision makers  - Fall precautions  - SW consult to discuss Health care agent     Gluacoma  - Resume eye gtts        Clinically Significant Risk Factors                   # Hypertension: Noted on problem list            # Cachexia: Estimated body mass index is 14.45 kg/m  as calculated from the following:    Height as of this encounter: 1.575 m (5' 2\").    Weight as of this encounter: 35.8 kg (79 lb)., PRESENT ON ADMISSION       # Financial/Environmental Concerns: none              TOTAL DISCHARGE TIME:  Marilee ELENA MD, personally saw the patient today and spent greater than 30 minutes discharging this patient.    Marilee Kim MD  [facility]  Hospitalist Service   Securely message with the Vocera Web Console (learn more here)  Text page via Placemeter Paging/Directory        Significant Results and Procedures   N/a    Pending Results   These results will be followed up by n/a   Unresulted Labs Ordered in the Past 30 Days of this Admission       No orders found from 10/5/2024 to 11/5/2024.            Code Status   Full Code       Primary Care Physician   Valerie Vincent    Physical Exam   Temp: 97.8  F (36.6  C) Temp src: Oral BP: 125/66 Pulse: 82   Resp: 16 SpO2: 92 % O2 Device: None (Room air)    Vitals:    11/04/24 1522   Weight: 35.8 kg (79 lb)     Vital Signs with Ranges  Temp:  [97.3  F (36.3  C)-98.1  F (36.7  C)] 97.8  F (36.6  C)  Pulse:  [82-94] 82  Resp:  [16-20] 16  BP: (110-168)/(56-95) 125/66  SpO2:  [89 %-94 %] 92 %  I/O last 3 completed shifts:  In: 120 [P.O.:120]  Out: -     Constitutional: Awake, alert, cooperative, no apparent distress.    HEENT: PERRL, Normocephalic, without obvious abnormality, atraumatic, oral pharynx with moist mucus membranes  Pulmonary: No increased work of breathing, good air exchange, clear to auscultation bilaterally, no crackles or wheezing. Marked kyphosis.  Cardiovascular: " Regular rate and rhythm, normal S1 and S2  GI: Normal bowel sounds, soft, non-distended, non-tender.  Skin/Integumen: Visualized skin appeared clear.  Neuro: CN II-XII grossly intact other then left eye blurriness   Psych:  Alert and oriented x 1-2, very forgetful  Extremities: No lower extremity edema noted    Discharge Disposition   Discharged to home  Condition at discharge: Stable    Consultations This Hospital Stay   NEUROLOGY IP STROKE CONSULT  SPEECH LANGUAGE PATH ADULT IP CONSULT  PHARMACY IP CONSULT  PHARMACY IP CONSULT  PHARMACY IP CONSULT  PHYSICAL THERAPY ADULT IP CONSULT  OCCUPATIONAL THERAPY ADULT IP CONSULT  REHAB ADMISSIONS LIAISON IP CONSULT  CARE MANAGEMENT / SOCIAL WORK IP CONSULT  SMOKING CESSATION PROGRAM IP CONSULT      Discharge Orders      Adult Neuropsychology  Referral      Home Care Referral      Adult Cardiology Eval  Referral      Reason for your hospital stay    You were admitted to the hospital for a left retinal artery occulusion and chronic micro-hemorrhages in the bilateral cerebral hemispheres.     Follow-up and recommended labs and tests     Follow up with primary care provider, Valerie Vincent, within 7-10 days for hospital follow- up.  No follow up labs or test are needed.    Please follow up with Stroke Neurology.    Please follow up with your Retinal Specialist.     Activity    Your activity upon discharge: activity as tolerated. NO DRIVING until cleared by OT/ophthalmology for visual deficits and and neuropsychology with cognitive testing.     Home Blood Pressure Monitor Order for DME - ONLY FOR DME    Check blood pressure twice daily AM and PM before medications.  Long-term goal less than 130 systolic (top number)/80 diastolic (bottom number).  Keep log and bring to medical visits.     Home blood pressure monitorin. Avoid eating, smoking, and exercising for at least 30 minutes before taking a reading.    2. Put your left arm through the cuff loop.  "The bottom of the cuff should be about 1/2\" above your elbow. The cuff tubing should be positioned along the middle of the inside of your arm.    3. Pull the cuff so it is tightened evenly around your arm. Press the velcro material together to secure the cuff.    4. Sit in a chair with your feet flat on the floor. Rest your left arm on a table so that the arm cuff is at the same level as your heart.    5. Turn the monitor on. When the heart symbol appears next to a zero, the monitor is ready to measure. Inflate the cuff using either the automatic start button or the inflation bulb, depending on which monitor you are using.    6. Remain still during the reading. The cuff will deflate automatically.    7. When measurement is complete, your blood pressure and pulse readings will alternately display on the digital panel.    8. Wait 5-10 minutes before taking another reading.      I, the undersigned, certify that the above prescribed supplies are medically necessary for this patient and is both reasonable and necessary in reference to accepted standards of medical and necessary in reference to accepted standards of medical practice in the treatment of this patient's condition and is not prescribed as a convenience.     Diet    Follow this diet upon discharge: Regular Diet Adult     Stroke Hospital Follow Up (for neurologist use only)    [facility] will call you to coordinate care as prescribed by your provider. If you don t hear from a representative within 2 business days, please call [phone].       [facility] MAIL OUT     Discharge Medications   Discharge Medication List as of 11/6/2024  2:29 PM        CONTINUE these medications which have CHANGED    Details   aspirin 81 MG EC tablet Take 1 tablet (81 mg) by mouth daily., Disp-30 tablet, R-3, E-PrescribeFuture refills by PCP Dr. Valerie Vincent with phone number [phone].      atorvastatin (LIPITOR) 40 MG tablet Take 1 tablet (40 mg) by mouth every " evening., Disp-30 tablet, R-0, E-PrescribeFuture refills by PCP Dr. Valerie Vincent with phone number [phone].           CONTINUE these medications which have NOT CHANGED    Details   ADVAIR DISKUS 250-50 MCG/DOSE inhaler INHALE ONE PUFF BY MOUTH TWICE A DAY, Disp-1 each, R-3, E-Prescribe      bimatoprost (LUMIGAN) 0.01 % SOLN Place 1 drop into both eyes At Bedtime, Historical      brimonidine (ALPHAGAN P) 0.1 % ophthalmic solution Place 1 drop into both eyes 2 times daily, Historical      cholecalciferol 50 MCG (2000 UT) CAPS Take 2,000 Units by mouth at bedtime., Historical      dorzolamide-timolol (COSOPT) 2-0.5 % ophthalmic solution Place 1 drop into both eyes 2 times daily, Historical      lisinopril (ZESTRIL) 20 MG tablet Take 1 tablet (20 mg) by mouth daily, Disp-90 tablet, R-3, E-Prescribe           Allergies   No Known Allergies  Data   Most Recent 3 CBC's:  Recent Labs   Lab Test 11/05/24  0813 11/04/24  1612 02/28/22  0952   WBC 9.1 9.4 8.2   HGB 13.8 14.5 15.1   MCV 91 90 95    317 266      Most Recent 3 BMP's:  Recent Labs   Lab Test 11/06/24  1143 11/06/24  0716 11/05/24  1245 11/05/24  0834 11/05/24  0813 11/05/24  0114 11/04/24  1612 02/28/22  0952   0000   NA  --   --   --   --  138  --  137 139  --    POTASSIUM  --   --   --   --  4.1  --  3.9 4.0  --    CHLORIDE  --   --   --   --  98  --  99 106  --    CO2  --   --   --   --  26  --  26 25  --    BUN  --   --   --   --  12.0  --  10.5 17  --    CR  --   --   --   --  0.51  --  0.56 0.62  --    ANIONGAP  --   --   --   --  14  --  12 8  --    RICHARD  --   --   --   --  9.0  --  9.1 8.9  --    * 108* 106*   < > 109*   < > 108* 154*   < >    < > = values in this interval not displayed.     Most Recent 2 LFT's:  Recent Labs   Lab Test 02/28/22  0952 05/24/21  1003   AST 16  --    ALT 19 16   ALKPHOS 93  --    BILITOTAL 0.6  --      Most Recent INR's and Anticoagulation Dosing History:  Anticoagulation Dose History          Latest  Ref Rng & Units 11/4/2024   Recent Dosing and Labs   INR 0.85 - 1.15 1.14       Details                 Most Recent 3 Troponin's:No lab results found.  Most Recent Cholesterol Panel:  Recent Labs   Lab Test 11/04/24  1856   CHOL 188   *   HDL 57   TRIG 88     Most Recent 6 Bacteria Isolates From Any Culture (See EPIC Reports for Culture Details):No lab results found.  Most Recent TSH, T4 and A1c Labs:  Recent Labs   Lab Test 11/04/24  1612 01/24/18  0757   TSH  --  0.78   A1C 6.4*  --      Results for orders placed or performed during the hospital encounter of 11/04/24   CT Head w/o Contrast    Narrative    EXAM: CT HEAD W/O CONTRAST  LOCATION: [facility]  DATE: 11/4/2024    INDICATION: Left eye central retinal artery occlusion, concern for CVA. Visions change symptoms, blurred vision to left monocular vision for 3 weeks  COMPARISON: None.  TECHNIQUE: Routine CT Head without IV contrast. Multiplanar reformats. Dose reduction techniques were used.    FINDINGS:  INTRACRANIAL CONTENTS: No intracranial hemorrhage, extraaxial collection, or mass effect.  No CT evidence of acute infarct. Moderate presumed chronic small vessel ischemic changes. Moderate generalized volume loss. No hydrocephalus.     VISUALIZED ORBITS/SINUSES/MASTOIDS: No intraorbital abnormality. No paranasal sinus mucosal disease. No middle ear or mastoid effusion.    BONES/SOFT TISSUES: Multiple patchy lucencies throughout skull, nonspecific; could be due to aggressive osteopenia however underlying lesions cannot be excluded.      Impression    IMPRESSION:  1.  No acute intracranial process.   CTA Head Neck with Contrast    Narrative    EXAM: CTA HEAD NECK W CONTRAST  LOCATION: [facility]  DATE: 11/4/2024    INDICATION: Left eye central retinal artery occlusion, concern for CVA. Visions change symptoms, blurred vision to left monocular vision for 3 weeks  COMPARISON: None.  CONTRAST: 67mL Isovue  370  TECHNIQUE: Head and neck CT angiogram with IV contrast. Axial helical CT images of the head and neck vessels obtained during the arterial phase of intravenous contrast administration. Axial 2D reconstructed images and multiplanar 3D MIP reconstructed   images of the head and neck vessels were performed by the technologist. Dose reduction techniques were used. All stenosis measurements made according to NASCET criteria unless otherwise specified.    FINDINGS:   HEAD CTA:  ANTERIOR CIRCULATION: No stenosis/occlusion, aneurysm, or high flow vascular malformation. Standard [place] anatomy.    POSTERIOR CIRCULATION: No stenosis/occlusion, aneurysm, or high flow vascular malformation. Dominant left and smaller right vertebral artery contribute to a normal basilar artery.     DURAL VENOUS SINUSES: Expected enhancement of the major dural venous sinuses.    NECK CTA:  RIGHT CAROTID: Less than 50% narrowing.    LEFT CAROTID: Less than 50% narrowing.    VERTEBRAL ARTERIES: No focal stenosis or dissection. Dominant left and smaller right vertebral arteries.    AORTIC ARCH: Classic aortic arch anatomy with no significant stenosis at the origin of the great vessels.    NONVASCULAR STRUCTURES: Emphysema      Impression    IMPRESSION:     HEAD CTA:   1.  Normal CTA [place].    NECK CTA:  1.  No definite hemodynamically significant narrowing throughout major neck vessels.     MR Brain w/o & w Contrast    Narrative    EXAM: MR BRAIN W/O and W CONTRAST  LOCATION: [facility]  DATE: 11/5/2024    INDICATION: Central retinal artery occlusion.  COMPARISON: CT/CTA 11/4/2024.  CONTRAST: 3 mL Gadavist.  TECHNIQUE: Routine multiplanar multisequence head MRI without and with intravenous contrast.    FINDINGS:  INTRACRANIAL CONTENTS: No acute or subacute infarct. No mass, acute hemorrhage, or extra-axial fluid collections. Scattered nonspecific T2/FLAIR hyperintensities within the cerebral white  matter most consistent with mild chronic microvascular ischemic   change. Mild generalized cerebral atrophy. No hydrocephalus. Mild-to-moderate scattered foci of hemosiderin blooming in the cerebral hemispheres and cerebellar hemispheres as well as the basal ganglia and thalami. No pathologic contrast enhancement.    SELLA: No abnormality accounting for technique.    OSSEOUS STRUCTURES/SOFT TISSUES: Normal marrow signal. The major intracranial vascular flow voids are maintained. Inflammatory changes of the left temporomandibular joint.    ORBITS: No abnormality accounting for technique.     SINUSES/MASTOIDS: No paranasal sinus mucosal disease. No middle ear or mastoid effusion.       Impression    IMPRESSION:  1.  No acute intracranial process.  2.  Generalized brain atrophy and presumed microvascular ischemic changes as detailed above.  3.  Mild-to-moderate foci of chronic microhemorrhage in the bilateral cerebral hemispheres, cerebellar hemispheres and basal ganglia of indeterminate etiology but typically associated with chronic hypertension or amyloid angiopathy.   Echocardiogram Complete - For age > 60 yrs     Value    LVEF  40-45%    Narrative    [id number]  [id number]  [id number]  [id number]^KAYLA^KEERTHI^HILLARY     [facility]  Echocardiography Laboratory  [address] [place] [address]     Name: SENA MCGOVERN  MRN: [id number]  : 1945  Study Date: 2024 11:27 AM  Age: 79 yrs  Gender: Female  Patient Location: [facility]  Reason For Study: Cerebrovascular Incident  Ordering Physician: KEERTHI GARZA  Referring Physician: Valerie Vincent  Performed By: Drew Martinez     BSA: 1.3 m2  Height: 62 in  Weight: 80 lb  HR: 90  BP: 95/48 mmHg  ______________________________________________________________________________  Procedure  Complete Portable Echo Adult. Definity (NDC #81999-294) given intravenously.  Technically difficult  study.  ______________________________________________________________________________  Interpretation Summary     Mildly decreased left ventricular systolic function.  Visually estimated left ventricular ejection fraction is 40-45%.  Inferior, inferolateral akinesis.  Normal right ventricular size and systolic function.  The aortic valve was not well-visualized due to technically challenging  images.  Doppler data suggest moderate aortic valve stenosis with mild to moderate  regurgitation.  Peak aortic velocity 3.4 m/s, mean gradient 27 mmHg.  Aortic valve area not calculated due to sob normal LV measurement windows.  Moderate, eccentric mitral valve regurgitation.  Technically difficult study.     There is no comparison study available.  ______________________________________________________________________________  Left Ventricle  The left ventricle is normal in size. There is normal left ventricular wall  thickness. Mildly decreased left ventricular systolic function. The visual  ejection fraction is 40-45%. Left ventricular diastolic function is not  assessable. There is inferior & inferolateral wall akinesis.     Right Ventricle  The right ventricle is normal in size and function.     Atria  Normal left atrial size. Right atrial size is normal. There is no atrial shunt  seen.     Mitral Valve  The mitral valve leaflets appear normal. There is no evidence of stenosis,  fluttering, or prolapse. There is moderate (2+) mitral regurgitation. There is  no mitral valve stenosis.     Tricuspid Valve  Normal tricuspid valve. There is trace tricuspid regurgitation. Right  ventricular systolic pressure could not be approximated due to inadequate  tricuspid regurgitation.     Aortic Valve  The aortic valve is not well visualized. There is mild to moderate (1-2+)  aortic regurgitation. Moderate valvular aortic stenosis. The mean AoV pressure  gradient is 24.4 mmHg.     Pulmonic Valve  The pulmonic valve is not well  visualized.     Vessels  The aortic root is normal size. The inferior vena cava is normal.     Pericardium  There is no pericardial effusion.     Rhythm  Sinus rhythm was noted.  ______________________________________________________________________________  MMode/2D Measurements & Calculations  IVSd: 1.1 cm     LVIDd: 3.7 cm  LVIDs: 3.3 cm  LVPWd: 0.90 cm  FS: 8.5 %  LV mass(C)d: 109.3 grams  LV mass(C)dI: 84.4 grams/m2  Ao root diam: 3.8 cm  Ao root diam index Ht(cm/m): 2.4  Ao root diam index BSA (cm/m2): 2.9  RV Base: 3.3 cm  RWT: 0.49     Doppler Measurements & Calculations  MV E max garcia: 132.5 cm/sec  MV A max garcia: 142.0 cm/sec  MV E/A: 0.93  MV dec slope: 833.9 cm/sec2  MV dec time: 0.18 sec  Ao V2 max: 327.7 cm/sec  Ao max P.0 mmHg  Ao V2 mean: 230.2 cm/sec  Ao mean P.4 mmHg  Ao V2 VTI: 57.8 cm  AI P1/2t: 255.2 msec  LV V1 max P.6 mmHg  LV V1 max: 210.0 cm/sec  LV V1 VTI: 35.1 cm  PA acc time: 0.11 sec     AV Garcia Ratio (DI): 0.64  E/E' av.2  Lateral E/e': 15.0  Medial E/e': 25.4  RV S Garcia: 9.6 cm/sec     ______________________________________________________________________________  Report approved by: Dr Nicole Wilkes 2024 02:43 PM

## 2024-11-06 NOTE — PROGRESS NOTES
Reason for Admission: Central retinal artery occlusion of L.eye    Cognitive/Mentation: A/Ox dis place and situ  Neuros/CMS: Intact ex L.eye blind, confusion, and blurry in both eyes w/o glasses  VS: stable RA.   Tele: SR.  /GI: Continent.  Pulmonary: LS Diminished.  Pain: Denies.   Drains/Lines: PIV SL  Skin: Scattered bruising  Activity: Assist x 1 with GBW.  Diet: Reg with thin liquids. Takes pills whole with water.     Discharge: Prob tomorrow    Aggression Stoplight Tool: yellow, jumpy.

## 2024-11-06 NOTE — PROGRESS NOTES
Reason for Admission: Central retinal artery occlusion of L.eye     Cognitive/Mentation: A/Ox2-3; forgetful, dementia at baseline, anxious & restless at times  Neuros/CMS: Intact ex L.eye blind, confusion, and blurry in both eyes w/o glasses  VS: VSS on Rm Air  Tele: discontinued  /GI: Continent.  Pulmonary: LS diminished, denies SOB  Pain: Denies   Drains/Lines: PIV SL  Skin: Scattered bruising, scab to R elbow  Activity: Assist x 1 with GBW.  Diet: Reg with thin liquids. Takes pills whole with water.      Discharge: today; family to transport patient home w/ home cares, dme walker     Aggression Stoplight Tool: yellow; gets restless/anxious if family not at bedside

## 2024-11-06 NOTE — DISCHARGE INSTRUCTIONS
Know the warning signs and symptoms of stroke: BE FAST     B = Balance loss   E = Eyesight changes   F = Facial droop or numbness   A = Arm or leg weakness   S = Speech difficulty, slurred speech   T = Time to call 911 for help     HOMECARE NOTE:   Your doctor has ordered home care to help you after your hospital stay.  The staff will contact you to schedule your first visit.  This service will be provided by [facility].  If you have any question, or have not received a call within 48 hours of discharge, please call them at [phone], choose option #1 for Home Health, then choose option #1 for scheduling.    *please see homecare quality ratings for all homecares in your area at www.medicare.gov

## 2024-11-06 NOTE — PROGRESS NOTES
11/06/24 0900   Appointment Info   Signing Clinician's Name / Credentials (OT) Sophia Ashley, OTR/L   Living Environment   People in Home alone   Current Living Arrangements independent living facility   Home Accessibility no concerns   Living Environment Comments Pt initially stated she lives in a house with her spouse in [place], niece present later to confirm (as chart states) that pt lives alone in an ILF.   Self-Care   Usual Activity Tolerance moderate   Current Activity Tolerance moderate   Equipment Currently Used at Home   (niece believes pt has a shower chair, no walker)   Fall history within last six months no  (niece confirmed no recent fall hx)   Activity/Exercise/Self-Care Comment tub/shower   Instrumental Activities of Daily Living (IADL)   IADL Comments Pt stated she is ind with all IADLs, however, niece confirmed pt has assist for driving, med set up, shopping, finances. Meals at ILF.   General Information   Onset of Illness/Injury or Date of Surgery 11/04/24   Referring Physician Jack Herrera MD   Patient/Family Therapy Goal Statement (OT) not stated   Additional Occupational Profile Info/Pertinent History of Current Problem Berenice Krause is a 79 year old female with pertinent past medical history of HTN, HLD, COPD, history of memory loss, glaucoma, on PTA Lipitor 10 mg daily.  Not taking ASA.     She was seen by the retinal specialist 11/4/2024 due to left eye blurring/gray vision x 2.5-3 weeks.  She had woken up with the symptoms and have been persistent since.  She had seen her regular eye doctor a few days after symptoms started who referred her to retinal specialist.  Retinal saw concerning for retinal artery occlusion so referred her to the emergency department for stroke evaluation.  CT/CTA unremarkable.     She is a former smoker, quit 15 years ago.  Very rare social alcohol consumption not in the past year.  Denies nonprescription medication/herbal supplements/illicit  drug use.  She denies any associated eye/head/jaw pain, scalp tenderness or recent infections,fevers. No night sweats or unintentional weight loss.   Existing Precautions/Restrictions fall   Limitations/Impairments visual   Left Upper Extremity (Weight-bearing Status) full weight-bearing (FWB)   Right Upper Extremity (Weight-bearing Status) full weight-bearing (FWB)   Left Lower Extremity (Weight-bearing Status) full weight-bearing (FWB)   Right Lower Extremity (Weight-bearing Status) full weight-bearing (FWB)   Cognitive Status Examination   Cognitive Status Comments Pleassant/cooperative. Per chart and confirmed with niece, pt has diagnosed dementia. oriented to year with processing time needed. Not oriented to month. stated we are at the eye clinic.   Visual Perception   Impact of Vision Impairment on Function (Vision) L sided blurriness, has corrective lenses   Sensory   Sensory Quick Adds sensation intact   Pain Assessment   Patient Currently in Pain No   Posture   Posture kyphosis;protracted shoulders;forward head position   Range of Motion Comprehensive   Comment, General Range of Motion WNL   Strength Comprehensive (MMT)   Comment, General Manual Muscle Testing (MMT) Assessment global weakness, no asymmetries   Muscle Tone Assessment   Muscle Tone Quick Adds No deficits were identified   Coordination   Upper Extremity Coordination No deficits were identified   Bed Mobility   Comment (Bed Mobility) SBA   Transfers   Transfer Comments STS SBA/FWW   Balance   Balance Comments no LOB, mild unsteadiness (safer with FWW)   Activities of Daily Living   Additional Documentation   (slight impact on I/ADLs due to vision changes, otherwise appears pt is at baseline)   Clinical Impression   Criteria for Skilled Therapeutic Interventions Met (OT) Yes, treatment indicated   OT Diagnosis Change in function due to vision changes   OT Problem List-Impairments impacting ADL problems related  to;vision;balance;cognition;inability to direct their own care   Assessment of Occupational Performance 3-5 Performance Deficits   Identified Performance Deficits func navigation of env, I/ADLs affected by vision   Planned Therapy Interventions (OT) ADL retraining;visual perception;IADL retraining   Clinical Decision Making Complexity (OT) problem focused assessment/low complexity   Risk & Benefits of therapy have been explained evaluation/treatment results reviewed;care plan/treatment goals reviewed;risks/benefits reviewed;current/potential barriers reviewed;participants voiced agreement with care plan;participants included;patient  (niece)   Clinical Impression Comments Pt presents with L sided visual changes (baseline dementia). Will benefit from skilled IP OT to address safety and discharge rec.   OT Total Evaluation Time   OT Eval, Low Complexity Minutes (27953) 12   OT Goals   Therapy Frequency (OT) Daily   OT Predicted Duration/Target Date for Goal Attainment 11/20/24   OT Goals OT Goal 1;OT Goal 2   OT: Goal 1 Pt will demonstrate application of visual compensatory strategies during I/ADL tasks with less than min cuing.   OT: Goal 2 Pt will score 100% on home safety questions for inc safety determining level of assist pt would require at discharge.   Interventions   Interventions Quick Adds Self-Care/Home Management   Self-Care/Home Management   Self-Care/Home Mgmt/ADL, Compensatory, Meal Prep Minutes (43831) 18   Symptoms Noted During/After Treatment (Meal Preparation/Planning Training) none   Treatment Detail/Skilled Intervention Pt greeted for OT eval/treat, edu on role and intro with nursing assisting to explain role while completing neuro checks. Room set up completed. Pt declined all ADLs, stated she already completed. Able to demo figure 4/good ROM for ADL tasks. Bed mobility SBA, STS SBA/FWW. Tolerated approx 200 ft ambulation with SBA/FWW. OT engaged pt in functional scavenger hunt to address  vision/navigating environment. Edu on strategy of scanning. Needed cues to locate pictures in hallway. Inc difficulty locating room needing max assist. Returned to bed. At this time, niece present and able to provide accurate baseline info. Discussed d/c rec and recs for inc safety (initial 24/7 assist until HH OT/PT evals) and attention to activities such as med set up and supervision while pt takes pills. Edu on ensuring home env is safe/cleared for potential obstacles presenting as fall risk, nigustavo stated pt's place is very tidy. Discussed general ADLs should not be impacted greatly, but to supervise/assist as needed and OT will cont to screen ADLs (pt did not want to participate this date). Pt did well with general home safety questions this date. All needs met and alarm on for safety. Hand off provided to SW.   OT Discharge Planning   OT Plan visual hand out (letter cancellation), assess/provide edu to scan during ADL tasks (g/h, toileting), home safety questions, scavenger hunt/navigating hallway   OT Discharge Recommendation (DC Rec) home with assist;home with home care occupational therapy   OT Rationale for DC Rec Pt is likely near baseline, however, presenting with new vision changes. Lives alone in ILF. Has dementia at baseline with assist from family for med set up, driving, finances, cooking. Rec initial 24/7 and supervision while pt takes meds and completes ADLs/navigating env until  OT/PT initiated. Pt may benefit from FWW for inc safety.   OT Brief overview of current status SBA/FWW. Goals of therapy will be to address safe mobility and make recs for d/c to next level of care. Pt and RN will continue to follow all falls risk precautions as documented by RN staff while hospitalized.   OT Equipment Needed at Discharge walker, standard   Total Session Time   Timed Code Treatment Minutes 18   Total Session Time (sum of timed and untimed services) 30

## 2024-11-06 NOTE — PROGRESS NOTES
Care Management Follow Up    Length of Stay (days): 2    Expected Discharge Date: 11/06/2024     Concerns to be Addressed:       Patient plan of care discussed at interdisciplinary rounds: Yes    Anticipated Discharge Disposition: Home, Home Care              Anticipated Discharge Services:    Anticipated Discharge DME: Walker    Patient/family educated on Medicare website which has current facility and service quality ratings:    Education Provided on the Discharge Plan:    Patient/Family in Agreement with the Plan: yes    Referrals Placed by CM/SW: Homecare  Private pay costs discussed: Not applicable    Discussed  Partnership in Safe Discharge Planning  document with patient/family: No     Handoff Completed: No, handoff not indicated or clinically appropriate    Additional Information:  Met with patient and her niece Chari after OT evaluation.  Discussed recommendations for initial 24/7 assist, home care and use of a walker.  Patient does not have a walker but PT will issue one prior to discharge.  Zandra understands the recommendation for initial 24/7 assist and family will provide this.  She will also call facility to inquire about adding assisted living services for Nohemi Marcelino does not think TCU would be helpful.    Next Steps:  Continue to follow.    Addendum @ 1211:  Patient will discharge home with [facility].  Chari stated she will contact patient's PCP about hospital follow up as recommended.    Kay Welch RN, BSN, PHN  Inpatient Care Coordination  [facility]  Phone: [phone]

## 2024-11-06 NOTE — PLAN OF CARE
Goal Outcome Evaluation:  11/5/2024 [id number]  Reason for Admission: Central retinal artery occlusion     Cognitive/Mentation: A/Ox , confused but redirectable. Impulsive this shift, keep getting out of bed without calling.   Neuros/CMS: Intact ex baseline confusion and L eye vision loss. Generalized weakness.   VS: VSS on RA  Tele: NSR.  /GI: Continent. BS+. Passing gas  Pulmonary: LS diminshed.  Pain: denies.   Drains/Lines:2 PIV saline locked  Skin: scattered bruises, otherwise intact  Activity: Assist x SBA with GB and walker.  Diet: regular with thin liquids. Takes pills whole.      Therapies recs: return to assisted living with home PT/OT  Discharge: pending,  Prob today     Aggression Stoplight Tool: yellow      End of shift summary: Neuro exam stable.  Confused, but baseline per pt's niece.  Plan to discharge back to asst living when work-up is complete.

## 2024-11-07 NOTE — PLAN OF CARE
Occupational Therapy Discharge Summary    Reason for therapy discharge:    Discharged to home with home therapy.    Progress towards therapy goal(s). [facility] for goal details.  Goals partially met.  Barriers to achieving goals:   discharge from facility.    Therapy recommendation(s):      Continued therapy is recommended.  Rationale/Recommendations:  Pt is likely near baseline, however, presenting with new vision changes. Lives alone in ILF. Has dementia at baseline with assist from family for med set up, driving, finances, cooking. Rec initial 24/7 and supervision while pt takes meds and completes ADLs/navigating env until  OT/PT initiated. Pt may benefit from FWW for inc safety..     Writing therapist did not treat pt, note written based on previous treating therapist notes and recommendations. Please see chart and flow sheet for additional details.

## 2024-11-08 ENCOUNTER — PATIENT OUTREACH (OUTPATIENT)
Dept: CARE COORDINATION | Facility: CLINIC | Age: 79
End: 2024-11-08
Payer: COMMERCIAL

## 2024-11-08 NOTE — PROGRESS NOTES
Connected Care Resource Center:   [facility] Contact  [facility]/Voicemail     Clinical Data: Post-Discharge Outreach     Outreach attempted x 2.  Left message on patient's voicemail, providing [facility]'s central phone number of [phone] ([phone]) for questions/concerns and/or to schedule an appt with an [facility] provider, if they do not have a PCP.      Plan:  [facility] will do no further outreaches at this time.       SHELL Aviles  Connected Care Resource Center, [facility]    *Connected Care Resource Team does NOT follow patient ongoing. Referrals are identified based on internal discharge reports and the outreach is to ensure patient has an understanding of their discharge instructions.

## 2025-06-24 ENCOUNTER — HOSPITAL ENCOUNTER (INPATIENT)
Facility: CLINIC | Age: 80
End: 2025-06-24
Attending: EMERGENCY MEDICINE | Admitting: STUDENT IN AN ORGANIZED HEALTH CARE EDUCATION/TRAINING PROGRAM
Payer: MEDICARE

## 2025-06-24 ENCOUNTER — APPOINTMENT (OUTPATIENT)
Dept: GENERAL RADIOLOGY | Facility: CLINIC | Age: 80
DRG: 291 | End: 2025-06-24
Attending: EMERGENCY MEDICINE
Payer: MEDICARE

## 2025-06-24 DIAGNOSIS — I50.23 ACUTE ON CHRONIC SYSTOLIC CONGESTIVE HEART FAILURE (H): ICD-10-CM

## 2025-06-24 DIAGNOSIS — N32.89 BLADDER SPASM: Primary | ICD-10-CM

## 2025-06-24 DIAGNOSIS — J96.01 ACUTE HYPOXEMIC RESPIRATORY FAILURE (H): ICD-10-CM

## 2025-06-24 LAB
ALBUMIN SERPL BCG-MCNC: 3.6 G/DL (ref 3.5–5.2)
ALP SERPL-CCNC: 93 U/L (ref 40–150)
ALT SERPL W P-5'-P-CCNC: 30 U/L (ref 0–50)
ANION GAP SERPL CALCULATED.3IONS-SCNC: 10 MMOL/L (ref 7–15)
ANION GAP SERPL CALCULATED.3IONS-SCNC: 8 MMOL/L (ref 7–15)
AST SERPL W P-5'-P-CCNC: 25 U/L (ref 0–45)
BASOPHILS # BLD AUTO: 0.1 10E3/UL (ref 0–0.2)
BASOPHILS NFR BLD AUTO: 1 %
BILIRUB SERPL-MCNC: 0.9 MG/DL
BUN SERPL-MCNC: 25.5 MG/DL (ref 8–23)
BUN SERPL-MCNC: 30.7 MG/DL (ref 8–23)
CALCIUM SERPL-MCNC: 8.6 MG/DL (ref 8.8–10.4)
CALCIUM SERPL-MCNC: 8.8 MG/DL (ref 8.8–10.4)
CHLORIDE SERPL-SCNC: 102 MMOL/L (ref 98–107)
CHLORIDE SERPL-SCNC: 103 MMOL/L (ref 98–107)
CREAT SERPL-MCNC: 0.67 MG/DL (ref 0.51–0.95)
CREAT SERPL-MCNC: 0.76 MG/DL (ref 0.51–0.95)
CREAT SERPL-MCNC: 0.76 MG/DL (ref 0.51–0.95)
EGFRCR SERPLBLD CKD-EPI 2021: 79 ML/MIN/1.73M2
EGFRCR SERPLBLD CKD-EPI 2021: 79 ML/MIN/1.73M2
EGFRCR SERPLBLD CKD-EPI 2021: 88 ML/MIN/1.73M2
EOSINOPHIL # BLD AUTO: 0.1 10E3/UL (ref 0–0.7)
EOSINOPHIL NFR BLD AUTO: 1 %
ERYTHROCYTE [DISTWIDTH] IN BLOOD BY AUTOMATED COUNT: 15.2 % (ref 10–15)
EST. AVERAGE GLUCOSE BLD GHB EST-MCNC: 126 MG/DL
GLUCOSE SERPL-MCNC: 130 MG/DL (ref 70–99)
GLUCOSE SERPL-MCNC: 188 MG/DL (ref 70–99)
HBA1C MFR BLD: 6 %
HCO3 SERPL-SCNC: 31 MMOL/L (ref 22–29)
HCO3 SERPL-SCNC: 33 MMOL/L (ref 22–29)
HCT VFR BLD AUTO: 46.5 % (ref 35–47)
HGB BLD-MCNC: 14.9 G/DL (ref 11.7–15.7)
HOLD SPECIMEN: NORMAL
IMM GRANULOCYTES # BLD: 0.1 10E3/UL
IMM GRANULOCYTES NFR BLD: 1 %
LYMPHOCYTES # BLD AUTO: 1.8 10E3/UL (ref 0.8–5.3)
LYMPHOCYTES NFR BLD AUTO: 14 %
MAGNESIUM SERPL-MCNC: 1.8 MG/DL (ref 1.7–2.3)
MCH RBC QN AUTO: 30.5 PG (ref 26.5–33)
MCHC RBC AUTO-ENTMCNC: 32 G/DL (ref 31.5–36.5)
MCV RBC AUTO: 95 FL (ref 78–100)
MONOCYTES # BLD AUTO: 0.9 10E3/UL (ref 0–1.3)
MONOCYTES NFR BLD AUTO: 8 %
NEUTROPHILS # BLD AUTO: 9.4 10E3/UL (ref 1.6–8.3)
NEUTROPHILS NFR BLD AUTO: 77 %
NRBC # BLD AUTO: 0 10E3/UL
NRBC BLD AUTO-RTO: 0 /100
NT-PROBNP SERPL-MCNC: ABNORMAL PG/ML (ref 0–624)
PLATELET # BLD AUTO: 279 10E3/UL (ref 150–450)
POTASSIUM SERPL-SCNC: 3.8 MMOL/L (ref 3.4–5.3)
POTASSIUM SERPL-SCNC: 3.8 MMOL/L (ref 3.4–5.3)
POTASSIUM SERPL-SCNC: 3.9 MMOL/L (ref 3.4–5.3)
POTASSIUM SERPL-SCNC: 3.9 MMOL/L (ref 3.4–5.3)
PROT SERPL-MCNC: 6.7 G/DL (ref 6.4–8.3)
RBC # BLD AUTO: 4.89 10E6/UL (ref 3.8–5.2)
SODIUM SERPL-SCNC: 142 MMOL/L (ref 135–145)
SODIUM SERPL-SCNC: 145 MMOL/L (ref 135–145)
TROPONIN T SERPL HS-MCNC: <6 NG/L
WBC # BLD AUTO: 12.2 10E3/UL (ref 4–11)

## 2025-06-24 PROCEDURE — 83036 HEMOGLOBIN GLYCOSYLATED A1C: CPT | Performed by: STUDENT IN AN ORGANIZED HEALTH CARE EDUCATION/TRAINING PROGRAM

## 2025-06-24 PROCEDURE — 84155 ASSAY OF PROTEIN SERUM: CPT | Performed by: EMERGENCY MEDICINE

## 2025-06-24 PROCEDURE — 93005 ELECTROCARDIOGRAM TRACING: CPT

## 2025-06-24 PROCEDURE — 83880 ASSAY OF NATRIURETIC PEPTIDE: CPT | Performed by: EMERGENCY MEDICINE

## 2025-06-24 PROCEDURE — 96374 THER/PROPH/DIAG INJ IV PUSH: CPT

## 2025-06-24 PROCEDURE — 82565 ASSAY OF CREATININE: CPT | Performed by: STUDENT IN AN ORGANIZED HEALTH CARE EDUCATION/TRAINING PROGRAM

## 2025-06-24 PROCEDURE — 99222 1ST HOSP IP/OBS MODERATE 55: CPT | Mod: AI | Performed by: STUDENT IN AN ORGANIZED HEALTH CARE EDUCATION/TRAINING PROGRAM

## 2025-06-24 PROCEDURE — 85025 COMPLETE CBC W/AUTO DIFF WBC: CPT | Performed by: EMERGENCY MEDICINE

## 2025-06-24 PROCEDURE — 36415 COLL VENOUS BLD VENIPUNCTURE: CPT | Performed by: STUDENT IN AN ORGANIZED HEALTH CARE EDUCATION/TRAINING PROGRAM

## 2025-06-24 PROCEDURE — 84132 ASSAY OF SERUM POTASSIUM: CPT | Performed by: STUDENT IN AN ORGANIZED HEALTH CARE EDUCATION/TRAINING PROGRAM

## 2025-06-24 PROCEDURE — 120N000001 HC R&B MED SURG/OB

## 2025-06-24 PROCEDURE — 250N000013 HC RX MED GY IP 250 OP 250 PS 637: Performed by: STUDENT IN AN ORGANIZED HEALTH CARE EDUCATION/TRAINING PROGRAM

## 2025-06-24 PROCEDURE — 36415 COLL VENOUS BLD VENIPUNCTURE: CPT | Performed by: EMERGENCY MEDICINE

## 2025-06-24 PROCEDURE — 250N000011 HC RX IP 250 OP 636: Performed by: EMERGENCY MEDICINE

## 2025-06-24 PROCEDURE — 99285 EMERGENCY DEPT VISIT HI MDM: CPT | Mod: 25

## 2025-06-24 PROCEDURE — 83735 ASSAY OF MAGNESIUM: CPT | Performed by: STUDENT IN AN ORGANIZED HEALTH CARE EDUCATION/TRAINING PROGRAM

## 2025-06-24 PROCEDURE — 250N000009 HC RX 250: Performed by: STUDENT IN AN ORGANIZED HEALTH CARE EDUCATION/TRAINING PROGRAM

## 2025-06-24 PROCEDURE — 71046 X-RAY EXAM CHEST 2 VIEWS: CPT

## 2025-06-24 PROCEDURE — 84484 ASSAY OF TROPONIN QUANT: CPT | Performed by: EMERGENCY MEDICINE

## 2025-06-24 RX ORDER — AMOXICILLIN 250 MG
1 CAPSULE ORAL 2 TIMES DAILY PRN
Status: DISCONTINUED | OUTPATIENT
Start: 2025-06-24 | End: 2025-07-03 | Stop reason: HOSPADM

## 2025-06-24 RX ORDER — HYDRALAZINE HCL 10 MG
5 TABLET ORAL EVERY 4 HOURS PRN
Status: DISCONTINUED | OUTPATIENT
Start: 2025-06-24 | End: 2025-06-25

## 2025-06-24 RX ORDER — ATORVASTATIN CALCIUM 40 MG/1
40 TABLET, FILM COATED ORAL EVERY EVENING
Status: DISCONTINUED | OUTPATIENT
Start: 2025-06-24 | End: 2025-06-27

## 2025-06-24 RX ORDER — LATANOPROST 50 UG/ML
1 SOLUTION/ DROPS OPHTHALMIC AT BEDTIME
Status: DISCONTINUED | OUTPATIENT
Start: 2025-06-24 | End: 2025-07-03 | Stop reason: HOSPADM

## 2025-06-24 RX ORDER — BRIMONIDINE TARTRATE 2 MG/ML
1 SOLUTION/ DROPS OPHTHALMIC 2 TIMES DAILY
Status: DISCONTINUED | OUTPATIENT
Start: 2025-06-24 | End: 2025-07-03 | Stop reason: HOSPADM

## 2025-06-24 RX ORDER — ASPIRIN 81 MG/1
81 TABLET ORAL DAILY
Status: DISCONTINUED | OUTPATIENT
Start: 2025-06-25 | End: 2025-07-03 | Stop reason: HOSPADM

## 2025-06-24 RX ORDER — HYDRALAZINE HYDROCHLORIDE 20 MG/ML
5 INJECTION INTRAMUSCULAR; INTRAVENOUS EVERY 4 HOURS PRN
Status: DISCONTINUED | OUTPATIENT
Start: 2025-06-24 | End: 2025-06-25

## 2025-06-24 RX ORDER — CALCIUM CARBONATE 500 MG/1
1000 TABLET, CHEWABLE ORAL 4 TIMES DAILY PRN
Status: DISCONTINUED | OUTPATIENT
Start: 2025-06-24 | End: 2025-07-03 | Stop reason: HOSPADM

## 2025-06-24 RX ORDER — FLUTICASONE FUROATE AND VILANTEROL 100; 25 UG/1; UG/1
1 POWDER RESPIRATORY (INHALATION) DAILY
Status: DISCONTINUED | OUTPATIENT
Start: 2025-06-24 | End: 2025-07-03 | Stop reason: HOSPADM

## 2025-06-24 RX ORDER — ONDANSETRON 4 MG/1
4 TABLET, ORALLY DISINTEGRATING ORAL EVERY 6 HOURS PRN
Status: DISCONTINUED | OUTPATIENT
Start: 2025-06-24 | End: 2025-07-03 | Stop reason: HOSPADM

## 2025-06-24 RX ORDER — PROCHLORPERAZINE MALEATE 5 MG/1
5 TABLET ORAL EVERY 6 HOURS PRN
Status: DISCONTINUED | OUTPATIENT
Start: 2025-06-24 | End: 2025-07-03 | Stop reason: HOSPADM

## 2025-06-24 RX ORDER — LIDOCAINE 40 MG/G
CREAM TOPICAL
Status: DISCONTINUED | OUTPATIENT
Start: 2025-06-24 | End: 2025-07-03 | Stop reason: HOSPADM

## 2025-06-24 RX ORDER — DORZOLAMIDE HYDROCHLORIDE AND TIMOLOL MALEATE 20; 5 MG/ML; MG/ML
1 SOLUTION/ DROPS OPHTHALMIC 2 TIMES DAILY
Status: DISCONTINUED | OUTPATIENT
Start: 2025-06-24 | End: 2025-07-03 | Stop reason: HOSPADM

## 2025-06-24 RX ORDER — FUROSEMIDE 10 MG/ML
40 INJECTION INTRAMUSCULAR; INTRAVENOUS ONCE
Status: COMPLETED | OUTPATIENT
Start: 2025-06-24 | End: 2025-06-24

## 2025-06-24 RX ORDER — ENOXAPARIN SODIUM 100 MG/ML
30 INJECTION SUBCUTANEOUS EVERY 24 HOURS
Status: DISCONTINUED | OUTPATIENT
Start: 2025-06-24 | End: 2025-06-30

## 2025-06-24 RX ORDER — FUROSEMIDE 10 MG/ML
20 INJECTION INTRAMUSCULAR; INTRAVENOUS EVERY 12 HOURS
Status: DISCONTINUED | OUTPATIENT
Start: 2025-06-25 | End: 2025-06-25

## 2025-06-24 RX ORDER — AMOXICILLIN 250 MG
2 CAPSULE ORAL 2 TIMES DAILY PRN
Status: DISCONTINUED | OUTPATIENT
Start: 2025-06-24 | End: 2025-07-03 | Stop reason: HOSPADM

## 2025-06-24 RX ORDER — LISINOPRIL 10 MG/1
20 TABLET ORAL AT BEDTIME
Status: DISCONTINUED | OUTPATIENT
Start: 2025-06-24 | End: 2025-06-25

## 2025-06-24 RX ORDER — ONDANSETRON 2 MG/ML
4 INJECTION INTRAMUSCULAR; INTRAVENOUS EVERY 6 HOURS PRN
Status: DISCONTINUED | OUTPATIENT
Start: 2025-06-24 | End: 2025-07-03 | Stop reason: HOSPADM

## 2025-06-24 RX ADMIN — FUROSEMIDE 40 MG: 10 INJECTION, SOLUTION INTRAMUSCULAR; INTRAVENOUS at 15:06

## 2025-06-24 RX ADMIN — BRIMONIDINE TARTRATE 1 DROP: 2 SOLUTION/ DROPS OPHTHALMIC at 21:32

## 2025-06-24 RX ADMIN — ATORVASTATIN CALCIUM 40 MG: 40 TABLET, FILM COATED ORAL at 23:48

## 2025-06-24 RX ADMIN — LISINOPRIL 20 MG: 10 TABLET ORAL at 23:48

## 2025-06-24 RX ADMIN — LATANOPROST 1 DROP: 50 SOLUTION OPHTHALMIC at 21:38

## 2025-06-24 ASSESSMENT — ACTIVITIES OF DAILY LIVING (ADL)
ADLS_ACUITY_SCORE: 56
ADLS_ACUITY_SCORE: 40
ADLS_ACUITY_SCORE: 56
ADLS_ACUITY_SCORE: 58
ADLS_ACUITY_SCORE: 40
ADLS_ACUITY_SCORE: 56
ADLS_ACUITY_SCORE: 40
ADLS_ACUITY_SCORE: 40
ADLS_ACUITY_SCORE: 56
ADLS_ACUITY_SCORE: 56

## 2025-06-24 ASSESSMENT — COLUMBIA-SUICIDE SEVERITY RATING SCALE - C-SSRS
2. HAVE YOU ACTUALLY HAD ANY THOUGHTS OF KILLING YOURSELF IN THE PAST MONTH?: NO
6. HAVE YOU EVER DONE ANYTHING, STARTED TO DO ANYTHING, OR PREPARED TO DO ANYTHING TO END YOUR LIFE?: NO
1. IN THE PAST MONTH, HAVE YOU WISHED YOU WERE DEAD OR WISHED YOU COULD GO TO SLEEP AND NOT WAKE UP?: NO

## 2025-06-24 NOTE — ED PROVIDER NOTES
Emergency Department Note      History of Present Illness     Chief Complaint   Leg Swelling and Shortness of Breath      HPI   Berenice Krause is a 80 year old female with a history of COPD, hypertension, hypercholesteremia, and prediabetes who presents for evaluation of leg swelling and shortness of breath. Patient reports that she does not remember when the shortness of breath started. Denies any fever. To her knowledge, she has not been hospitalized for shortness of breath before. Patient's granddaughter denies any new or productive cough, although patient does have a cough at baseline due to a history of smoking. She adds that the leg swelling developed over the last week or so. Patient also has a large bruise on her right foot. Denies any memory of a fall or injury. Denies any pain in her foot and with movement of her ankle. Patient was sick with COVID a month ago, and had shortness of breath from that, but this had improved. Patient lives in a senior living facility and is mostly independent.     Independent Historian   Granddaughter as detailed above.    Review of External Notes   Reviewed hospitalization records from November last year. Pt had an echo that showed moderate aortic valve stenosis as well as EF of 40-45% with inferior and inferolateral akinesis.     Past Medical History     Medical History and Problem List   COPD   Glaucoma   Hypertension   Malnutrition   Central retinal artery occlusion of left eye   Idiopathic scoliosis of thoracic spine   Kyphosis of thoracic region   Prediabetes   Hypercholesteremia  Vitamin D deficiency    Ruptured ovarian cyst     Medications   Advair Diskus   Aspirin 81 mg   Lipitor   Lisinopril     Surgical History   Congenital atrial septal defect repair   Salpingo oophorectomy   Appendectomy     Physical Exam     Patient Vitals for the past 24 hrs:   BP Temp Temp src Pulse Resp SpO2   06/24/25 1809 (!) 148/90 97.9  F (36.6  C) Oral 91 22 93 %   06/24/25 1600 (!)  154/81 -- -- 95 -- 93 %   06/24/25 1535 (!) 130/105 -- -- 90 -- 92 %   06/24/25 1515 132/69 -- -- 96 -- 94 %   06/24/25 1440 -- -- -- -- -- 92 %   06/24/25 1439 -- -- -- -- -- 94 %   06/24/25 1438 -- -- -- -- -- 93 %   06/24/25 1436 -- -- -- -- -- 92 %   06/24/25 1435 -- -- -- -- -- 92 %   06/24/25 1434 -- -- -- -- -- 92 %   06/24/25 1430 124/66 -- -- 95 -- 93 %   06/24/25 1417 118/67 -- -- 91 -- 93 %   06/24/25 1403 117/79 -- -- 97 -- 94 %   06/24/25 1357 117/79 -- -- -- -- 93 %   06/24/25 1348 124/68 -- -- 102 -- 92 %   06/24/25 1331 120/70 -- -- 105 -- 95 %   06/24/25 1310 113/55 99.6  F (37.6  C) Temporal 98 22 (!) 91 %     Physical Exam  Nursing note and vitals reviewed.  HENT:   Mouth/Throat: Moist mucous membranes.   Eyes: EOMI, nonicteric sclera  Cardiovascular: Normal rate, regular rhythm, no murmur. Bilateral lower extremity edema.   Pulmonary/Chest: Effort normal and breath sounds normal. No respiratory distress. No wheezes. No rales.   Abdominal: Soft. Nontender, nondistended, no guarding or rigidity.   Musculoskeletal: Normal range of motion.  Significant bruising noted to right foot.  No pain to palpation.  Normal ankle and foot range of motion.  Neurological: Alert. Moves all extremities spontaneously.   Skin: Skin is warm and dry. No rash noted.         Diagnostics     Lab Results   Labs Ordered and Resulted from Time of ED Arrival to Time of ED Departure   COMPREHENSIVE METABOLIC PANEL - Abnormal       Result Value    Sodium 142      Potassium 3.8      Carbon Dioxide (CO2) 31 (*)     Anion Gap 8      Urea Nitrogen 30.7 (*)     Creatinine 0.67      GFR Estimate 88      Calcium 8.6 (*)     Chloride 103      Glucose 188 (*)     Alkaline Phosphatase 93      AST 25      ALT 30      Protein Total 6.7      Albumin 3.6      Bilirubin Total 0.9     NT-PROBNP - Abnormal    NT-proBNP 13,371 (*)    CBC WITH PLATELETS AND DIFFERENTIAL - Abnormal    WBC Count 12.2 (*)     RBC Count 4.89      Hemoglobin 14.9       Hematocrit 46.5      MCV 95      MCH 30.5      MCHC 32.0      RDW 15.2 (*)     Platelet Count 279      % Neutrophils 77      % Lymphocytes 14      % Monocytes 8      % Eosinophils 1      % Basophils 1      % Immature Granulocytes 1      NRBCs per 100 WBC 0      Absolute Neutrophils 9.4 (*)     Absolute Lymphocytes 1.8      Absolute Monocytes 0.9      Absolute Eosinophils 0.1      Absolute Basophils 0.1      Absolute Immature Granulocytes 0.1      Absolute NRBCs 0.0     TROPONIN T, HIGH SENSITIVITY - Normal    Troponin T, High Sensitivity <6     POTASSIUM - Normal    Potassium 3.8     HEMOGLOBIN A1C   CREATININE   MAGNESIUM   POTASSIUM       Imaging   Chest XR,  PA & LAT   Final Result   IMPRESSION:    Cardiomegaly. Severe thoracic kyphosis limits assessment. Mild streaky opacity suggested at the lower lungs that could be mild airspace disease versus atelectasis. Bilateral interstitial prominence and hazy opacities suggestive of mild edema.      Echocardiogram Complete    (Results Pending)       EKG   ECG results from 06/24/25   EKG 12 lead     Value    Systolic Blood Pressure     Diastolic Blood Pressure     Ventricular Rate 102    Atrial Rate 102    IL Interval 194    QRS Duration 118        QTc 456    P Axis 41    R AXIS -13    T Axis -29    Interpretation ECG      Sinus tachycardia with Premature atrial complexes  Minimal voltage criteria for LVH, may be normal variant ( Jah product )  Inferior infarct (cited on or before 04-Nov-2024)  Abnormal ECG  When compared with ECG of 04-Nov-2024 16:11,  Premature atrial complexes are now Present  ST now depressed in Lateral leads  Nonspecific T wave abnormality now evident in Anterior leads           Independent Interpretation   I independently reviewed the CXR. Significant rotation. Equivocal edema vs atelectasis.       ED Course      Medications Administered   Medications   aspirin EC tablet 81 mg (has no administration in time range)   atorvastatin (LIPITOR)  tablet 40 mg (has no administration in time range)   latanoprost (XALATAN) 0.005 % ophthalmic solution 1 drop (has no administration in time range)   brimonidine (ALPHAGAN) 0.2 % ophthalmic solution 1 drop (has no administration in time range)   lisinopril (ZESTRIL) tablet 20 mg (has no administration in time range)   fluticasone-vilanterol (BREO ELLIPTA) 100-25 MCG/ACT inhaler 1 puff (has no administration in time range)   lidocaine 1 % 0.1-1 mL (has no administration in time range)   lidocaine (LMX4) cream (has no administration in time range)   sodium chloride (PF) 0.9% PF flush 3 mL (has no administration in time range)   sodium chloride (PF) 0.9% PF flush 3 mL (has no administration in time range)   senna-docusate (SENOKOT-S/PERICOLACE) 8.6-50 MG per tablet 1 tablet (has no administration in time range)     Or   senna-docusate (SENOKOT-S/PERICOLACE) 8.6-50 MG per tablet 2 tablet (has no administration in time range)   calcium carbonate (TUMS) chewable tablet 1,000 mg (has no administration in time range)   enoxaparin ANTICOAGULANT (LOVENOX) injection 30 mg (has no administration in time range)   hydrALAZINE (APRESOLINE) half-tab 5 mg (has no administration in time range)     Or   hydrALAZINE (APRESOLINE) injection 5 mg (has no administration in time range)   ondansetron (ZOFRAN ODT) ODT tab 4 mg (has no administration in time range)     Or   ondansetron (ZOFRAN) injection 4 mg (has no administration in time range)   prochlorperazine (COMPAZINE) injection 5 mg (has no administration in time range)     Or   prochlorperazine (COMPAZINE) tablet 5 mg (has no administration in time range)   furosemide (LASIX) injection 20 mg (has no administration in time range)   Continuing ACE inhibitor/ARB/ARNI from home medication list OR ACE inhibitor/ARB/ARNI order already placed during this visit (has no administration in time range)   Continuing beta blocker from home medication list OR beta blocker order already placed  during this visit (has no administration in time range)   furosemide (LASIX) injection 40 mg (40 mg Intravenous $Given 6/24/25 1501)       Procedures   Procedures     Discussion of Management   Admitting hospitalist, Dr. Ennis    Additional Documentation  None    Medical Decision Making / Diagnosis     CMS Diagnoses: None    MIPS   None         MDM   Berenice Kraues is a 80 year old female who presents with chief complaint dyspnea.  Patient noted to be hypoxic in triage and ultimately required 4 L of oxygen to maintain sats above 90%.  Patient's actual chief complaint was lower extremity swelling and hypoxia was more incidental.  She has bilateral lower extremity swelling on exam with significant bruising to the right foot.  Patient does not recall any injury.  She has no pain to palpation of the foot to suggest need for imaging.  Top of differential included heart failure, also considered COPD, ACS, pneumothorax, pneumonia, PE, among other etiologies.  Workup is most suggestive of CHF given elevated BNP, pulmonary edema, and echocardiogram from within the last year suggesting systolic dysfunction.  Given small stature, 40 mg of IV Lasix initiated to begin diuresis.  Discussed with hospitalist, Dr. Ennis, who accepts patient for admission.  Discussed with patient and niece at bedside and answered all questions.  They are in agreement with the plan.    Disposition   The patient was admitted to the hospital.     Diagnosis     ICD-10-CM    1. Acute on chronic systolic congestive heart failure (H)  I50.23       2. Acute hypoxemic respiratory failure (H)  J96.01            Scribe Disclosure:  Noe ELENA, am serving as a scribe at 2:15 PM on 6/24/2025 to document services personally performed by Lg Mobley MD based on my observations and the provider's statements to me.        Lg Mobley MD  06/24/25 2464

## 2025-06-24 NOTE — ED NOTES
[facility]  ED Nurse Handoff Report    ED Chief complaint: Leg Swelling and Shortness of Breath  . ED Diagnosis:   Final diagnoses:   None       Allergies: No Known Allergies    Code Status: MD to discuss    Activity level - Baseline/Home:  walker.  Activity Level - Current:   in bed.   Lift room needed: No.   Bariatric: No   Needed: No   Isolation: No.   Infection: Not Applicable.     Respiratory status: Nasal cannula 2L    Vital Signs (within 30 minutes):   Vitals:    06/24/25 1440 06/24/25 1515 06/24/25 1535 06/24/25 1600   BP:  132/69 (!) 130/105 (!) 154/81   Pulse:  96 90 95   Resp:       Temp:       TempSrc:       SpO2: 92% 94% 92% 93%       Cardiac Rhythm:  ,      Pain level:    Patient confused: Yes.   Patient Falls Risk: bed/chair alarm on, nonskid shoes/slippers when out of bed, arm band in place, patient and family education, assistive device/personal items within reach, activity supervised, and mobility aid in reach.   Elimination Status: Has voided     Patient Report - Initial Complaint: Swollen feet. Bruised foot..   Focused Assessment:  Berenice Krause is a 80 year old female with a history of COPD, hypertension, hypercholesteremia, and prediabetes who presents for evaluation of leg swelling and shortness of breath. Patient reports that she does not remember when the shortness of breath started. Denies any fever. To her knowledge, she has not been hospitalized for shortness of breath before. Patient's granddaughter denies any new or productive cough, although patient does have a cough at baseline due to a history of smoking. She adds that the leg swelling developed over the last week or so. Patient also has a large bruise on her right foot. Denies any memory of a fall or injury. Denies any pain in her foot and with movement of her ankle. Patient was sick with COVID a month ago, and had shortness of breath from that, but this had improved. Patient lives in a senior  living facility and is mostly independent.     Abnormal Results:   Labs Ordered and Resulted from Time of ED Arrival to Time of ED Departure   COMPREHENSIVE METABOLIC PANEL - Abnormal       Result Value    Sodium 142      Potassium 3.8      Carbon Dioxide (CO2) 31 (*)     Anion Gap 8      Urea Nitrogen 30.7 (*)     Creatinine 0.67      GFR Estimate 88      Calcium 8.6 (*)     Chloride 103      Glucose 188 (*)     Alkaline Phosphatase 93      AST 25      ALT 30      Protein Total 6.7      Albumin 3.6      Bilirubin Total 0.9     NT-PROBNP - Abnormal    NT-proBNP 13,371 (*)    CBC WITH PLATELETS AND DIFFERENTIAL - Abnormal    WBC Count 12.2 (*)     RBC Count 4.89      Hemoglobin 14.9      Hematocrit 46.5      MCV 95      MCH 30.5      MCHC 32.0      RDW 15.2 (*)     Platelet Count 279      % Neutrophils 77      % Lymphocytes 14      % Monocytes 8      % Eosinophils 1      % Basophils 1      % Immature Granulocytes 1      NRBCs per 100 WBC 0      Absolute Neutrophils 9.4 (*)     Absolute Lymphocytes 1.8      Absolute Monocytes 0.9      Absolute Eosinophils 0.1      Absolute Basophils 0.1      Absolute Immature Granulocytes 0.1      Absolute NRBCs 0.0     TROPONIN T, HIGH SENSITIVITY - Normal    Troponin T, High Sensitivity <6          Chest XR,  PA & LAT   Final Result   IMPRESSION:    Cardiomegaly. Severe thoracic kyphosis limits assessment. Mild streaky opacity suggested at the lower lungs that could be mild airspace disease versus atelectasis. Bilateral interstitial prominence and hazy opacities suggestive of mild edema.          Treatments provided: See mar  Family Comments: Daughter at bedside  OBS brochure/video discussed/provided to patient:  No  ED Medications:   Medications   furosemide (LASIX) injection 40 mg (40 mg Intravenous $Given 6/24/25 7790)       Drips infusing:  No  For the majority of the shift this patient was Green.   Interventions performed were Lasix.    Sepsis treatment initiated:  No    Cares/treatment/interventions/medications to be completed following ED care: See orders    ED Nurse Name: Jordy Humphreys RN  4:05 PM  RECEIVING UNIT ED HANDOFF REVIEW    Above ED Nurse Handoff Report was reviewed: Yes  Reviewed by: Nathaly Banerjee RN on June 24, 2025 at 6:14 PM   ULICES Navarro called the ED to inform them the note was read: Yes

## 2025-06-24 NOTE — H&P
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    Probable Acute HFrEF Exacberbation  Moderate Aortic Stenosis  Dyspnea on Exertion  Bilateral Lower Extremity Edema  Presenting with BLE edema x1 week and some dyspnea on exertion noted by her assisted living staff. CXR with bilateral interstitial prominence and hazy interstital opacities consistent with probable mild pulmonary edema. NTproBNP 32124. Troponin <6; less likely due to active ischemia. TTE from 11/2024 with EF 40-45%; though patient reports no knowledge of previous diagnosis of heart failure. Will continue lasix 40mg BID and obtain repeat TTE in the morning. There is possibility that patient's symptoms are primarily from progressive aortic stenosis, so will opt for more gentle diuresis overnight until repeat TTE obtained. Continue strict I/Os and daily weights. Cardiology consulted, appreciate recommendations.    Right Foot Hematoma  Patient with significant bruising to dorsum of right foot with area of ballotable blood blister. Will obtain xray to ensure no fracture. Consider orthopedic vs general surgery consult to drain hematoma.    Prerenal Azotemia  BUN 30.7, creatinine 0.67. Continuing to monitor, especially in setting of diuresis for suspected heart failure exacerbation.    Hyperglycemia  Prediabetes  Glucose 188. A1C from 11/2024 6.4. Rechecking A1C as I suspect she has type 2 diabetes  mellitus at this point.    Hypertension: Continue PTA lisinopril.  Hyperlipidemia: Continue PTA statin.  CRAO of Left Eye: Continue PTA ASA + eye drops  Cognitive Impairment: Patient resides in assisted living facility with additional services being added.        Diet:  Cardiac; <2g Na, <1800ml  DVT Prophylaxis: Enoxaparin (Lovenox) SQ  Herbert Catheter: Not present  Lines: None     Cardiac Monitoring: None  Code Status:  DNR/DNI    Clinically Significant Risk Factors Present on Admission           # Hypocalcemia: Lowest Ca = 8.6 mg/dL in last 2 days, will monitor and replace as appropriate       # Drug Induced Platelet Defect: home medication list includes an antiplatelet medication   # Hypertension: Noted on problem list  # Heart failure, NOS: heart failure noted on the problem list and last echo with EF 40-50%              # Financial/Environmental Concerns:           Disposition Plan     Medically Ready for Discharge: Anticipated in 2-4 Days           Kumar Ennis MD  Hospitalist Service  [facility]  Securely message with [employer] (more info)  Text page via [employer] Paging/Directory     ______________________________________________________________________    Chief Complaint   Bilateral lower extremity edema    History is obtained from the patient and patient's niece    History of Present Illness   Berenice Krause is a 80 year old female with past medical history significant for HFmrEF w/ interior & interolateral akinesis, COPD, hypertension, hyperlipidemia, prediabetes, 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  She stated that her sister-in-law and her niece, who is present at bedside, other months with major concerns.  Her niece  reports that the patient has had bilateral lower extremity edema increasing in size for the past 1 week.  She reports that her assisted-living staff also told them about apparent shortness of breath with ambulation.  The patient reports that she has not noticed these, nor did she have any other concerns.    In the emergency department the patient was afebrile. Her heart rate was . Blood pressures 117-154/. She was requiring 3L via nasal cannula to maintain oxygen saturations >92%.  Laboratory studies were notable for bicarb 31, BUN 30.7, creatinine 0.67, glucose 188, NT proBNP 13,371, troponin less than 6, WBC 12.2.  ECG was obtained showing sinus tachycardia, but without ST segment dynamic changes.  She did have some PACs as well as T wave inversions in leads III, V1, and V2; morphology is similar to prior ECG obtained 11/2024.  Chest x-ray with interstitial prominence and hazy interstitial opacities. She received Lasix 40 mg IV and was admitted for further cares of suspected heart failure exacerbation.    Past Medical History    Past Medical History:   Diagnosis Date    COPD (chronic obstructive pulmonary disease) (H)     Glaucoma     HTN (hypertension)     Malnutrition        Past Surgical History   No past surgical history on file.    Prior to Admission Medications   Prior to Admission Medications   Prescriptions Last Dose Informant Patient Reported? Taking?   ADVAIR DISKUS 250-50 MCG/DOSE inhaler   No No   Sig: INHALE ONE PUFF BY MOUTH TWICE A DAY   aspirin 81 MG EC tablet   No No   Sig: Take 1 tablet (81 mg) by mouth daily.   atorvastatin (LIPITOR) 40 MG tablet   No No   Sig: Take 1 tablet (40 mg) by mouth every evening.   bimatoprost (LUMIGAN) 0.01 % SOLN  Self Yes No   Sig: Place 1 drop into both eyes At Bedtime   brimonidine (ALPHAGAN P) 0.1 % ophthalmic solution  Self Yes No   Sig: Place 1 drop into both eyes 2 times daily   cholecalciferol 50 MCG (2000 UT) CAPS   Yes No   Sig: Take 2,000 Units  "by mouth at bedtime.   dorzolamide-timolol (COSOPT) 2-0.5 % ophthalmic solution  Self Yes No   Sig: Place 1 drop into both eyes 2 times daily   lisinopril (ZESTRIL) 20 MG tablet   No No   Sig: Take 1 tablet (20 mg) by mouth daily   Patient taking differently: Take 20 mg by mouth at bedtime.      Facility-Administered Medications: None          Physical Exam   Temp: 97.9  F (36.6  C) Temp src: Oral BP: (!) 148/90 Pulse: 91   Resp: 22 SpO2: 93 % O2 Device: Nasal cannula Oxygen Delivery: 3 LPM      Estimated body mass index is 14.45 kg/m  as calculated from the following:    Height as of 11/4/24: 1.575 m (5' 2\").    Weight as of 11/4/24: 35.8 kg (79 lb).    General: Very pleasant female resting comfortably in hospital bed.  Awake, alert, interactive. Niece at bedside.  HEENT: Normocephalic, atraumatic.  PERRL, EOMI.  Conjunctiva clear, sclerae anicteric.  Mucous membranes moist.  Cardiac: Regular rate and rhythm without gallop, or rub.  Loud RUSB systolic murmur. 2-3+ peripheral edema to ankles.  Respiratory: Normal work of breathing.  Clear to auscultation bilaterally without wheezing, rales, or rhonchi.  Musculoskeletal: Significant kyphosis. Large hematoma with ballotable blood blister to dorsum or right foot.  Skin: No rashes or abrasions on exposed skin.  Neurologic: Alert and oriented x4.  Cranial nerves II through XII grossly intact.  Psychologic: Appropriate mood and affect.      Medical Decision Making       65 MINUTES SPENT BY ME on the date of service doing chart review, history, exam, documentation & further activities per the note.      Data     I have personally reviewed the following data over the past 24 hrs:    12.2 (H)  \   14.9   / 279     142 103 30.7 (H) /  188 (H)   3.8 31 (H) 0.67 \     ALT: 30 AST: 25 AP: 93 TBILI: 0.9   ALB: 3.6 TOT PROTEIN: 6.7 LIPASE: N/A     Trop: <6 BNP: 13,371 (H)       Imaging results reviewed over the past 24 hrs:   Recent Results (from the past 24 hours)   Chest XR,  PA " & LAT    Narrative    EXAM: XR CHEST 2 VIEWS  LOCATION: [facility]  DATE: 6/24/2025    INDICATION: Dyspnea, BLE swelling.  COMPARISON: 1/23/2018.      Impression    IMPRESSION:   Cardiomegaly. Severe thoracic kyphosis limits assessment. Mild streaky opacity suggested at the lower lungs that could be mild airspace disease versus atelectasis. Bilateral interstitial prominence and hazy opacities suggestive of mild edema.

## 2025-06-24 NOTE — ED NOTES
[facility]    ED Boarding Nurse Handoff Addendum Report:    Date/time: 6/24/2025, 5:43 PM    Neuro: Alert to and Self  Activity: are 1 Assist with Gait Belt. Pivot turn to bed side commode  Telemetry Monitoring: Yes -    Pain: denying pain.  : voiding using bedside commode, occasional incontinent.   O2: 3 L NC  LDA's: Peripheral  Fluids: is Saline locked.  Diet: Cardiac and 1800 ML fluid restriction  Living Situation:   Consults: PT, OT, and Cardiology  Discharge Disposition: TBD      Vital signs (within last 30 minutes):    Vitals:    06/24/25 1535 06/24/25 1600 06/24/25 1809 06/24/25 1900   BP: (!) 130/105 (!) 154/81 (!) 148/90 100/54   BP Location:   Left arm Right arm   Pulse: 90 95 91 89   Resp:   22 18   Temp:   97.9  F (36.6  C) 98.2  F (36.8  C)   TempSrc:   Oral Oral   SpO2: 92% 93% 93% 94%       ED Boarding Nurse name: Zuleima Meade RN

## 2025-06-24 NOTE — ED TRIAGE NOTES
Patient coming in with c/o increased lower leg swelling over the past week and increased SOB. Has Hx of COPD. Not normally on O2. Placed on 4L O2 in triage. ABCs intact.

## 2025-06-25 ENCOUNTER — APPOINTMENT (OUTPATIENT)
Dept: CARDIOLOGY | Facility: CLINIC | Age: 80
End: 2025-06-25
Attending: STUDENT IN AN ORGANIZED HEALTH CARE EDUCATION/TRAINING PROGRAM
Payer: MEDICARE

## 2025-06-25 ENCOUNTER — APPOINTMENT (OUTPATIENT)
Dept: GENERAL RADIOLOGY | Facility: CLINIC | Age: 80
DRG: 291 | End: 2025-06-25
Attending: STUDENT IN AN ORGANIZED HEALTH CARE EDUCATION/TRAINING PROGRAM
Payer: MEDICARE

## 2025-06-25 ENCOUNTER — APPOINTMENT (OUTPATIENT)
Dept: OCCUPATIONAL THERAPY | Facility: CLINIC | Age: 80
DRG: 291 | End: 2025-06-25
Attending: STUDENT IN AN ORGANIZED HEALTH CARE EDUCATION/TRAINING PROGRAM
Payer: MEDICARE

## 2025-06-25 LAB
ALBUMIN SERPL BCG-MCNC: 3.1 G/DL (ref 3.5–5.2)
ALP SERPL-CCNC: 80 U/L (ref 40–150)
ALT SERPL W P-5'-P-CCNC: 22 U/L (ref 0–50)
ANION GAP SERPL CALCULATED.3IONS-SCNC: 7 MMOL/L (ref 7–15)
AST SERPL W P-5'-P-CCNC: 28 U/L (ref 0–45)
ATRIAL RATE - MUSE: 102 BPM
BILIRUB SERPL-MCNC: 1 MG/DL
BUN SERPL-MCNC: 30.7 MG/DL (ref 8–23)
CALCIUM SERPL-MCNC: 8.3 MG/DL (ref 8.8–10.4)
CHLORIDE SERPL-SCNC: 100 MMOL/L (ref 98–107)
CREAT SERPL-MCNC: 0.93 MG/DL (ref 0.51–0.95)
CRP SERPL-MCNC: 11.47 MG/L
CYSTATIN C (ROCHE): 1.3 MG/L (ref 0.6–1)
DIASTOLIC BLOOD PRESSURE - MUSE: NORMAL MMHG
EGFRCR SERPLBLD CKD-EPI 2021: 62 ML/MIN/1.73M2
ERYTHROCYTE [DISTWIDTH] IN BLOOD BY AUTOMATED COUNT: 14.7 % (ref 10–15)
GFR/BSA.PRED SERPLBLD CYS-BASED-ARV: 47 ML/MIN/1.73M2
GLUCOSE SERPL-MCNC: 106 MG/DL (ref 70–99)
HCO3 SERPL-SCNC: 36 MMOL/L (ref 22–29)
HCT VFR BLD AUTO: 44.5 % (ref 35–47)
HGB BLD-MCNC: 14.1 G/DL (ref 11.7–15.7)
INTERPRETATION ECG - MUSE: NORMAL
LVEF ECHO: NORMAL
MCH RBC QN AUTO: 30.4 PG (ref 26.5–33)
MCHC RBC AUTO-ENTMCNC: 31.7 G/DL (ref 31.5–36.5)
MCV RBC AUTO: 96 FL (ref 78–100)
P AXIS - MUSE: 41 DEGREES
PLATELET # BLD AUTO: 218 10E3/UL (ref 150–450)
POTASSIUM SERPL-SCNC: 4.1 MMOL/L (ref 3.4–5.3)
PR INTERVAL - MUSE: 194 MS
PROCALCITONIN SERPL IA-MCNC: 0.12 NG/ML
PROT SERPL-MCNC: 6 G/DL (ref 6.4–8.3)
QRS DURATION - MUSE: 118 MS
QT - MUSE: 350 MS
QTC - MUSE: 456 MS
R AXIS - MUSE: -13 DEGREES
RBC # BLD AUTO: 4.64 10E6/UL (ref 3.8–5.2)
SODIUM SERPL-SCNC: 143 MMOL/L (ref 135–145)
SYSTOLIC BLOOD PRESSURE - MUSE: NORMAL MMHG
T AXIS - MUSE: -29 DEGREES
VENTRICULAR RATE- MUSE: 102 BPM
WBC # BLD AUTO: 11.6 10E3/UL (ref 4–11)

## 2025-06-25 PROCEDURE — 36415 COLL VENOUS BLD VENIPUNCTURE: CPT | Performed by: STUDENT IN AN ORGANIZED HEALTH CARE EDUCATION/TRAINING PROGRAM

## 2025-06-25 PROCEDURE — 93306 TTE W/DOPPLER COMPLETE: CPT | Mod: 26 | Performed by: INTERNAL MEDICINE

## 2025-06-25 PROCEDURE — 250N000011 HC RX IP 250 OP 636: Performed by: INTERNAL MEDICINE

## 2025-06-25 PROCEDURE — 73630 X-RAY EXAM OF FOOT: CPT | Mod: RT

## 2025-06-25 PROCEDURE — 250N000011 HC RX IP 250 OP 636: Performed by: STUDENT IN AN ORGANIZED HEALTH CARE EDUCATION/TRAINING PROGRAM

## 2025-06-25 PROCEDURE — 255N000002 HC RX 255 OP 636: Performed by: STUDENT IN AN ORGANIZED HEALTH CARE EDUCATION/TRAINING PROGRAM

## 2025-06-25 PROCEDURE — 99233 SBSQ HOSP IP/OBS HIGH 50: CPT | Performed by: INTERNAL MEDICINE

## 2025-06-25 PROCEDURE — 97166 OT EVAL MOD COMPLEX 45 MIN: CPT | Mod: GO | Performed by: OCCUPATIONAL THERAPIST

## 2025-06-25 PROCEDURE — 99223 1ST HOSP IP/OBS HIGH 75: CPT | Mod: 25 | Performed by: INTERNAL MEDICINE

## 2025-06-25 PROCEDURE — 94640 AIRWAY INHALATION TREATMENT: CPT

## 2025-06-25 PROCEDURE — 250N000013 HC RX MED GY IP 250 OP 250 PS 637: Performed by: STUDENT IN AN ORGANIZED HEALTH CARE EDUCATION/TRAINING PROGRAM

## 2025-06-25 PROCEDURE — 97535 SELF CARE MNGMENT TRAINING: CPT | Mod: GO | Performed by: OCCUPATIONAL THERAPIST

## 2025-06-25 PROCEDURE — 999N000208 ECHOCARDIOGRAM COMPLETE

## 2025-06-25 PROCEDURE — 85014 HEMATOCRIT: CPT | Performed by: STUDENT IN AN ORGANIZED HEALTH CARE EDUCATION/TRAINING PROGRAM

## 2025-06-25 PROCEDURE — 80053 COMPREHEN METABOLIC PANEL: CPT | Performed by: STUDENT IN AN ORGANIZED HEALTH CARE EDUCATION/TRAINING PROGRAM

## 2025-06-25 PROCEDURE — 36415 COLL VENOUS BLD VENIPUNCTURE: CPT | Performed by: INTERNAL MEDICINE

## 2025-06-25 PROCEDURE — 84145 PROCALCITONIN (PCT): CPT | Performed by: INTERNAL MEDICINE

## 2025-06-25 PROCEDURE — 86140 C-REACTIVE PROTEIN: CPT | Performed by: INTERNAL MEDICINE

## 2025-06-25 PROCEDURE — 82610 CYSTATIN C: CPT | Performed by: INTERNAL MEDICINE

## 2025-06-25 PROCEDURE — 120N000001 HC R&B MED SURG/OB

## 2025-06-25 PROCEDURE — 999N000157 HC STATISTIC RCP TIME EA 10 MIN

## 2025-06-25 RX ORDER — LISINOPRIL 2.5 MG/1
5 TABLET ORAL AT BEDTIME
Status: DISCONTINUED | OUTPATIENT
Start: 2025-06-25 | End: 2025-06-25

## 2025-06-25 RX ORDER — LISINOPRIL 2.5 MG/1
2.5 TABLET ORAL AT BEDTIME
Status: DISCONTINUED | OUTPATIENT
Start: 2025-06-25 | End: 2025-06-29

## 2025-06-25 RX ORDER — MAGNESIUM SULFATE HEPTAHYDRATE 40 MG/ML
2 INJECTION, SOLUTION INTRAVENOUS ONCE
Status: COMPLETED | OUTPATIENT
Start: 2025-06-25 | End: 2025-06-25

## 2025-06-25 RX ORDER — FUROSEMIDE 10 MG/ML
20 INJECTION INTRAMUSCULAR; INTRAVENOUS
Status: DISCONTINUED | OUTPATIENT
Start: 2025-06-25 | End: 2025-06-25

## 2025-06-25 RX ADMIN — BRIMONIDINE TARTRATE 1 DROP: 2 SOLUTION/ DROPS OPHTHALMIC at 09:33

## 2025-06-25 RX ADMIN — BRIMONIDINE TARTRATE 1 DROP: 2 SOLUTION/ DROPS OPHTHALMIC at 21:13

## 2025-06-25 RX ADMIN — LATANOPROST 1 DROP: 50 SOLUTION OPHTHALMIC at 21:12

## 2025-06-25 RX ADMIN — FUROSEMIDE 20 MG: 10 INJECTION, SOLUTION INTRAMUSCULAR; INTRAVENOUS at 06:22

## 2025-06-25 RX ADMIN — DORZOLAMIDE HYDROCHLORIDE AND TIMOLOL MALEATE 1 DROP: 20; 5 SOLUTION OPHTHALMIC at 21:12

## 2025-06-25 RX ADMIN — DORZOLAMIDE HYDROCHLORIDE AND TIMOLOL MALEATE 1 DROP: 20; 5 SOLUTION OPHTHALMIC at 09:33

## 2025-06-25 RX ADMIN — ATORVASTATIN CALCIUM 40 MG: 40 TABLET, FILM COATED ORAL at 21:12

## 2025-06-25 RX ADMIN — DORZOLAMIDE HYDROCHLORIDE AND TIMOLOL MALEATE 1 DROP: 20; 5 SOLUTION OPHTHALMIC at 00:26

## 2025-06-25 RX ADMIN — ENOXAPARIN SODIUM 30 MG: 30 INJECTION SUBCUTANEOUS at 18:14

## 2025-06-25 RX ADMIN — HUMAN ALBUMIN MICROSPHERES AND PERFLUTREN 4 ML (DILUTED): 10; .22 INJECTION, SOLUTION INTRAVENOUS at 08:08

## 2025-06-25 RX ADMIN — ASPIRIN 81 MG: 81 TABLET, DELAYED RELEASE ORAL at 09:33

## 2025-06-25 RX ADMIN — FLUTICASONE FUROATE AND VILANTEROL TRIFENATATE 1 PUFF: 100; 25 POWDER RESPIRATORY (INHALATION) at 08:27

## 2025-06-25 RX ADMIN — MAGNESIUM SULFATE HEPTAHYDRATE 2 G: 40 INJECTION, SOLUTION INTRAVENOUS at 10:59

## 2025-06-25 ASSESSMENT — ACTIVITIES OF DAILY LIVING (ADL)
ADLS_ACUITY_SCORE: 39
ADLS_ACUITY_SCORE: 39
ADLS_ACUITY_SCORE: 36
ADLS_ACUITY_SCORE: 39
ADLS_ACUITY_SCORE: 36
ADLS_ACUITY_SCORE: 37
ADLS_ACUITY_SCORE: 39
ADLS_ACUITY_SCORE: 39
ADLS_ACUITY_SCORE: 36
ADLS_ACUITY_SCORE: 37
ADLS_ACUITY_SCORE: 36
ADLS_ACUITY_SCORE: 36
ADLS_ACUITY_SCORE: 39
ADLS_ACUITY_SCORE: 36
ADLS_ACUITY_SCORE: 37
ADLS_ACUITY_SCORE: 40
ADLS_ACUITY_SCORE: 37
ADLS_ACUITY_SCORE: 39
ADLS_ACUITY_SCORE: 36

## 2025-06-25 NOTE — PLAN OF CARE
"Pt alert & intermittent confused. Bps soft. 2L NC Denies pain. 1800 fluid restriction. On tele running SR. On IV lasix. R foot bruised with 3+ edema. X-ray taken of R foot. Cardiology following.       Goal Outcome Evaluation:      Plan of Care Reviewed With: patient    Overall Patient Progress: improvingOverall Patient Progress: improving      Problem: Adult Inpatient Plan of Care  Goal: Plan of Care Review  Description: The Plan of Care Review/Shift note should be completed every shift.  The Outcome Evaluation is a brief statement about your assessment that the patient is improving, declining, or no change.  This information will be displayed automatically on your shift  note.  Outcome: Progressing  Flowsheets (Taken 6/25/2025 0557)  Plan of Care Reviewed With: patient  Overall Patient Progress: improving  Goal: Patient-Specific Goal (Individualized)  Description: You can add care plan individualizations to a care plan. Examples of Individualization might be:  \"Parent requests to be called daily at 9am for status\", \"I have a hard time hearing out of my right ear\", or \"Do not touch me to wake me up as it startles  me\".  Outcome: Progressing  Goal: Absence of Hospital-Acquired Illness or Injury  Outcome: Progressing  Intervention: Identify and Manage Fall Risk  Recent Flowsheet Documentation  Taken 6/24/2025 2140 by Martínez Felipe RN  Safety Promotion/Fall Prevention:   activity supervised   assistive device/personal items within reach   nonskid shoes/slippers when out of bed   patient and family education   room near nurse's station   safety round/check completed   supervised activity  Intervention: Prevent Skin Injury  Recent Flowsheet Documentation  Taken 6/24/2025 2140 by Martínez Felipe RN  Body Position: weight shifting  Goal: Optimal Comfort and Wellbeing  Outcome: Progressing  Goal: Readiness for Transition of Care  Outcome: Progressing  Intervention: Mutually Develop Transition Plan  Recent Flowsheet " Documentation  Taken 6/24/2025 1940 by Martínez Felipe RN  Equipment Currently Used at Home: walker, standard     Problem: Delirium  Goal: Optimal Coping  Outcome: Progressing  Goal: Improved Behavioral Control  Outcome: Progressing  Goal: Improved Attention and Thought Clarity  Outcome: Progressing  Goal: Improved Sleep  Outcome: Progressing     Problem: Heart Failure  Goal: Optimal Coping  Outcome: Progressing  Goal: Optimal Cardiac Output  Outcome: Progressing  Goal: Stable Heart Rate and Rhythm  Outcome: Progressing  Goal: Fluid and Electrolyte Balance  Outcome: Progressing  Goal: Optimal Functional Ability  Outcome: Progressing  Intervention: Optimize Functional Ability  Recent Flowsheet Documentation  Taken 6/24/2025 2140 by Martínez Felipe RN  Activity Management:   ambulated to bathroom   back to bed  Goal: Improved Oral Intake  Outcome: Progressing  Goal: Effective Oxygenation and Ventilation  Outcome: Progressing  Intervention: Promote Airway Secretion Clearance  Recent Flowsheet Documentation  Taken 6/24/2025 2140 by Martínez Felipe RN  Activity Management:   ambulated to bathroom   back to bed  Goal: Effective Breathing Pattern During Sleep  Outcome: Progressing

## 2025-06-25 NOTE — PLAN OF CARE
Goal Outcome Evaluation:      Plan of Care Reviewed With: family    Overall Patient Progress: no changeOverall Patient Progress: no change    Outcome Evaluation: Patient comes in from the [facility]. Her brother will meet with CM for plan of care. Discharge needs TBD.

## 2025-06-25 NOTE — PROGRESS NOTES
[facility]    Medicine Progress Note - Hospitalist Service    Date of Admission:  6/24/2025    Assessment & Plan   Berenice Krause is a 80 year old female admitted on 6/24/2025 lower extremity swelling and SOB due thought due to exacerbation of HFmrEF. He was also found to have an apparent large hematoma over the dorsum of the right foot.     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     In the emergency department the patient was afebrile. Her heart rate was . Blood pressures 117-154/. She was requiring 3L via nasal cannula to maintain oxygen saturations >92%.    Laboratory studies were notable for bicarb 31, BUN 30.7, creatinine 0.67, glucose 188, NT proBNP 13,371, troponin less than 6, WBC 12.2.    ECG: sinus tachycardia, but without ST segment dynamic changes.  She did have some PACs as well as T wave inversions in leads III, V1, and V2; morphology is similar to prior ECG obtained 11/2024.    Chest x-ray: interstitial prominence and hazy interstitial opacities.   ED interventions: Lasix 40 mg IV and was admitted for further cares of suspected heart failure exacerbation.     DX:  PRETTY and peripheral edema in the setting of known HFmrEF, moderate AORTIC STENOSIS and COPD (not oxygen-dependent). NT-BNP 13K and TTE from 11/2024 40-45%. Echo obtained today shows persistent LV EF 40-45% with mild RV dysfunction.  Mod MR, Mod TR, Mod AR (though the aortic regurgitation may be worse than documented).   Right foot hematoma. This is superficial and does NOT look acute. It looks to have significant edema involved in the flaccid fullness. NO PULSE IS FOUND WITH DOPPLER, BUT PT DENIES PAIN AND IS ABLE TO MOVE HER FOOT WITHOUT PROBLEMS.  Pre-renal azotemia.  Creat 0.67, BUN 30 on admission. Suspect the creatinine over-estimates his kidney function.   Subseqeuntly developed mild HARJIT (creat up to 0.93) after diuresis.   Hyperglycemia in the setting of pre-diabetes NIDDM. HbA1c 6.0.   Leukocytosis with left shift.   HTN.  Currently relatively hypotensive.  Central retinal artery occlusion, left eye.   Cognitive impairment is suggested in Dr. Ennis' note, though the pt is fully alert and appropriate to my evaluation. Her sister-in-law indicates that her level of awareness and alertness waxes and wanes.   Severe malnutrition. I suspect this is due to chronic aspiration.  She seems to cough with every swallow.   PLAN:  Was admitted on diuretics, but at this time the creat has already started to rise. Hold diuretics at this time.   Reduce Lisinopril to 2.5 mg daily.   Usual home meds are resumed.   Check Cystatin C.  Briefly discussed with the pt the cardiologist's assessment. At this time, the pt is not interested in considering hospice, but avoiding interventions is confirmed.           Diet: Combination Diet 2 gm NA Diet; Low Saturated Fat Na <2400mg Diet, No Caffeine Diet  Fluid restriction 1800 ML FLUID    DVT Prophylaxis: Enoxaparin (Lovenox) SQ  Herbert Catheter: Not present  Lines: None     Cardiac Monitoring: ACTIVE order. Indication: Acute decompensated heart failure (48 hours)  Code Status: No CPR- Do NOT Intubate      Clinically Significant Risk Factors Present on Admission           # Hypocalcemia: Lowest Ca = 8.6 mg/dL in last 2 days, will monitor and replace as appropriate       # Drug Induced Platelet Defect: home medication list includes an antiplatelet medication   # Hypertension: Noted on problem list  # Heart failure, NOS: heart failure noted on the problem list and last echo with EF 40-50%              # Financial/Environmental Concerns:           Social Drivers of Health    Tobacco Use: Medium Risk (11/19/2024)    [facility]    Patient History     Smoking Tobacco Use: Former     Smokeless Tobacco Use:  Never          Disposition Plan     Medically Ready for Discharge: Anticipated in 2-4 Days      Juan Solis MD  Hospitalist Service  [facility]  Securely message with [employer] (more info)  Text page via [employer] Paging/Directory   ______________________________________________________________________    Interval History   Chart reviewed, pt interviewed.    I spoke briefly with Dr. Wong regarding his assessment.     When I met with the pt, she was initially sleeping, but became more interactive as time went on. She then was answering questions appropriately. She denies pain, and her SINAN indicates that the pt was SOB and more edematous than usual for which reason she was brought to the ED last night.  Pt reports she is feeling better at this time. Still requiring supplemental O2 to maintain sats > 90%.    Physical Exam   Vital Signs: Temp: 98  F (36.7  C) Temp src: Axillary BP: (!) 100/39 Pulse: 76   Resp: 16 SpO2: 97 % O2 Device: Nasal cannula Oxygen Delivery: 3 LPM  Weight: 78 lbs 4.21 oz    General Appearance: Very thin, frail, elderly woman in NAD. Calm in bed on O2 at 2 LPM by NC.  Severe kyphosis noted. Marked decrease in muscular bulk and subcutaneous fat.   Respiratory: No increased WOB. Decreased air entry at the left posterior. No obvious rales or wheeze.   Cardiovascular: RRR with soft systolic Aortic as well as Tricuspid area murmurs.   GI: soft, NTND  Skin: the right foot is bruised-appearing with purple discoloration and flaccid edema over the dorsum of the foot. I am unable to find a pulse even with Doppler device.   Trace edema otherwise. Decreased muscular bulk noted over the upper mmore so than the lower extremities.   Other:  Denies areas of pain.     Medical Decision Making       60 MINUTES SPENT BY ME on the date of service doing chart review, history, exam, documentation & further activities per the note.      Data   Recent Results (from the past 24 hours)   Creatinine    Result Value Ref Range    Creatinine 0.76 0.51 - 0.95 mg/dL    GFR Estimate 79 >60 mL/min/1.73m2   Potassium   Result Value Ref Range    Potassium 3.9 3.4 - 5.3 mmol/L   Basic metabolic panel   Result Value Ref Range    Sodium 145 135 - 145 mmol/L    Potassium 3.9 3.4 - 5.3 mmol/L    Chloride 102 98 - 107 mmol/L    Carbon Dioxide (CO2) 33 (H) 22 - 29 mmol/L    Anion Gap 10 7 - 15 mmol/L    Urea Nitrogen 25.5 (H) 8.0 - 23.0 mg/dL    Creatinine 0.76 0.51 - 0.95 mg/dL    GFR Estimate 79 >60 mL/min/1.73m2    Calcium 8.8 8.8 - 10.4 mg/dL    Glucose 130 (H) 70 - 99 mg/dL   XR Foot Port Right 3 Views    Narrative    EXAM: XR FOOT PORT RIGHT 3 VIEWS  LOCATION: [facility]  DATE: 6/25/2025    INDICATION: large hematoma; r o underlying fracture  COMPARISON: None.      Impression    IMPRESSION: 3 views right foot. Marked soft tissue swelling about the dorsum of the metatarsals and tarsal metatarsal junction. No acute fractures seen. Right foot otherwise unremarkable. If continued clinical concern, additional cross-sectional imaging   should be considered.   Comprehensive metabolic panel   Result Value Ref Range    Sodium 143 135 - 145 mmol/L    Potassium 4.1 3.4 - 5.3 mmol/L    Carbon Dioxide (CO2) 36 (H) 22 - 29 mmol/L    Anion Gap 7 7 - 15 mmol/L    Urea Nitrogen 30.7 (H) 8.0 - 23.0 mg/dL    Creatinine 0.93 0.51 - 0.95 mg/dL    GFR Estimate 62 >60 mL/min/1.73m2    Calcium 8.3 (L) 8.8 - 10.4 mg/dL    Chloride 100 98 - 107 mmol/L    Glucose 106 (H) 70 - 99 mg/dL    Alkaline Phosphatase 80 40 - 150 U/L    AST 28 0 - 45 U/L    ALT 22 0 - 50 U/L    Protein Total 6.0 (L) 6.4 - 8.3 g/dL    Albumin 3.1 (L) 3.5 - 5.2 g/dL    Bilirubin Total 1.0 <=1.2 mg/dL   CBC with platelets   Result Value Ref Range    WBC Count 11.6 (H) 4.0 - 11.0 10e3/uL    RBC Count 4.64 3.80 - 5.20 10e6/uL    Hemoglobin 14.1 11.7 - 15.7 g/dL    Hematocrit 44.5 35.0 - 47.0 %    MCV 96 78 - 100 fL    MCH 30.4 26.5 - 33.0 pg    MCHC 31.7  31.5 - 36.5 g/dL    RDW 14.7 10.0 - 15.0 %    Platelet Count 218 150 - 450 10e3/uL   Echocardiogram Complete   Result Value Ref Range    LVEF  40-45%     [facility]    [id number]  [id number]  [id number]  [id number]^ROB^ROHIT^     [facility]  Echocardiography Laboratory  [address] [place] [address]     Name: SENA MCGOVERN  MRN: [id number]  : 1945  Study Date: 2025 07:30 AM  Age: 80 yrs  Gender: Female  Patient Location: [facility]  Reason For Study: Heart Failure  Ordering Physician: ROHIT RIVAS  Referring Physician: Valerie Vincent  Performed By: Jeanne Lezama     BSA: 1.8 m2  Height: 62 in  Weight: 178 lb  HR: 72  BP: 148/90 mmHg  ______________________________________________________________________________  Procedure  Echocardiogram with two-dimensional, color and spectral Doppler. Optison (NDC  #0759-7323) given intravenously.  ______________________________________________________________________________  Interpretation Summary     Left ventricular systolic function is mild to moderately reduced.The visual  ejection fraction is 40-45%.Basal to mid lateral wall hypokinesia, basal to  mid inferior wall hypokinesia, inferolateral wall hypokinesia  The right ventricular systolic function is mildly reduced.The right ventricle  is mildly dilated.  There is severe mitral annular calcification.There is mild mitral  stenosis.There is moderate (2+) mitral regurgitation.  There is moderate (2+) tricuspid regurgitation.  There is moderate (2+) aortic regurgitation-peak aortic valve velocity 3.5  m/s, mean gradients 30 mmHg, aortic valve area 1.5 cmÂ .Aortic regurgitation  Doppler pressure half-time is low at 230 ms-this could indicate worse AI then  visually estimated, alternatively this could be due to high LV filling  pressure (however E/E prime ratio is in the indeterminate range)  Pulmonary hypertension- RVSP 16 mm hg +RA.  IVC diameter >2.1 cm collapsing <50% with sniff suggests a  high RA pressure  estimated at 15 mmHg or greater.     Compared to prior study dated 11/5/2024 no significant changes     ______________________________________________________________________________  Left Ventricle  Left ventricular systolic function is mild to moderately reduced. The visual  ejection fraction is 40-45%. Basal to mid lateral wall hypokinesia, basal to  mid inferior wall hypokinesia, inferolateral wall hypokinesia.     Right Ventricle  The right ventricle is mildly dilated. The right ventricular systolic function  is mildly reduced.     Atria  The left atrium is moderately dilated. The right atrium is mildly dilated.  There is no color Doppler evidence of an atrial shunt.     Mitral Valve  There is severe mitral annular calcification. There is moderate (2+) mitral  regurgitation. There is mild mitral stenosis. The mean mitral valve gradient  is 3.5 mmHg.     Tricuspid Valve  There is moderate (2+) tricuspid regurgitation. The right ventricular systolic  pressure is approximated at 46.0 mmHg plus the right atrial pressure.  Pulmonary hypertension.     Aortic Valve  The aortic valve is trileaflet. There is moderate (2+) aortic regurgitation.  Aortic regurgitation Doppler pressure half-time is low at 230 ms-this could  indicate worse AI then visually estimated, alternatively this could be due to  high LV filling pressure (however E/E prime ratio is in the indeterminate  range). Moderate valvular aortic stenosis. Peak aortic valve velocity 3.5 m/s,  mean gradients 30 mmHg, aortic valve area 1.5 cmÂ .     Pulmonic Valve  There is mild (1+) pulmonic valvular regurgitation. There is no pulmonic  valvular stenosis.     Vessels  The aortic root is normal size. Normal size ascending aorta. IVC diameter >2.1  cm collapsing <50% with sniff suggests a high RA pressure estimated at 15 mmHg  or greater.     Pericardium  There is no pericardial effusion.     Rhythm  Sinus rhythm was  noted.  ______________________________________________________________________________  MMode/2D Measurements & Calculations  IVC diam: 2.2 cm     Ao root diam: 3.6 cm  LVOT diam: 2.1 cm  LVOT area: 3.4 cm2  Ao root diam index Ht(cm/m): 2.3  Ao root diam index BSA (cm/m2): 2.0  LA Volume (BP): 38.7 ml  LA Volume Index (BP): 21.3 ml/m2  RV Base: 3.4 cm  TAPSE: 1.2 cm     Doppler Measurements & Calculations  MV E max garica: 77.2 cm/sec  MV A max garcia: 143.7 cm/sec  MV E/A: 0.54  MV max P.8 mmHg  MV mean PG: 3.5 mmHg  MV V2 VTI: 36.4 cm  MVA(VTI): 3.2 cm2  MV dec slope: 509.7 cm/sec2  MV dec time: 0.15 sec  Ao V2 max: 343.1 cm/sec  Ao max P.2 mmHg  Ao V2 mean: 246.6 cm/sec  Ao mean P.7 mmHg  Ao V2 VTI: 76.4 cm  LIYA(I,D): 1.5 cm2  LIYA(V,D): 1.5 cm2  AI P1/2t: 218.0 msec  LV V1 max P.9 mmHg  LV V1 max: 157.2 cm/sec  LV V1 VTI: 34.8 cm  SV(LVOT): 116.7 ml  SI(LVOT): 64.1 ml/m2  PA V2 max: 115.5 cm/sec  PA max P.3 mmHg  PA acc time: 0.17 sec  PI end-d garcia: 187.7 cm/sec  TR max garcia: 322.8 cm/sec  TR max P.0 mmHg  AV Garcia Ratio (DI): 0.46  LIYA Index (cm2/m2): 0.84  E/E' avg: 10.6  Lateral E/e': 8.3  Medial E/e': 12.9  RV S Garcia: 9.5 cm/sec     ______________________________________________________________________________  Report approved by: Juve Glasgow MD on 2025 09:29 AM

## 2025-06-25 NOTE — PHARMACY-ADMISSION MEDICATION HISTORY
Pharmacist Admission Medication History    Admission medication history is complete. The information provided in this note is only as accurate as the sources available at the time of the update.    Information Source(s): Patient, Family member, and Facility after hours nurse via in-person and phone    Pertinent Information: Patient's family member was aware of PTA meds and states that the Lumigan eye drops were at bedtime and the Alphagan P and Cosopt were two times daily    Changes made to PTA medication list:  Added: None  Deleted: None  Changed: None    Allergies reviewed with patient and updates made in EHR: unable to assess    Medication History Completed By: Beryl [facility]der [facility] 6/24/2025 7:50 PM      PTA Med List   Medication Sig Last Dose/Taking    ADVAIR DISKUS 250-50 MCG/DOSE inhaler INHALE ONE PUFF BY MOUTH TWICE A DAY 6/24/2025 Morning    aspirin 81 MG EC tablet Take 1 tablet (81 mg) by mouth daily. 6/24/2025 Morning    atorvastatin (LIPITOR) 40 MG tablet Take 1 tablet (40 mg) by mouth every evening. 6/23/2025 Evening    bimatoprost (LUMIGAN) 0.01 % SOLN Place 1 drop into both eyes At Bedtime 6/23/2025 Bedtime    brimonidine (ALPHAGAN P) 0.1 % ophthalmic solution Place 1 drop into both eyes 2 times daily 6/24/2025 Morning    cholecalciferol 50 MCG (2000 UT) CAPS Take 2,000 Units by mouth daily. 6/24/2025 Morning    dorzolamide-timolol (COSOPT) 2-0.5 % ophthalmic solution Place 1 drop into both eyes 2 times daily 6/24/2025 Morning    lisinopril (ZESTRIL) 20 MG tablet Take 1 tablet (20 mg) by mouth daily 6/24/2025 Morning

## 2025-06-25 NOTE — PLAN OF CARE
"A&OX1, disoriented to place, time, and situation. No c/o of pain or dicomfort. Consulted with cards, see notes. On tele. On 2L of oxygen via NC. On mag and K protocol. Strict I&O's. Will continue with plan of care.     Goal Outcome Evaluation:      Plan of Care Reviewed With: patient, family    Overall Patient Progress: no changeOverall Patient Progress: no change    Outcome Evaluation: A&OX1, disoriented to place, time, and situation. No c/o of pain or dicomfort. Consulted with cards, see notes. On tele. On 2L of oxygen via NC. On mag and K protocol. Strict I&O's. Will continue with plan of care.      Problem: Adult Inpatient Plan of Care  Goal: Plan of Care Review  Description: The Plan of Care Review/Shift note should be completed every shift.  The Outcome Evaluation is a brief statement about your assessment that the patient is improving, declining, or no change.  This information will be displayed automatically on your shift  note.  Outcome: Progressing  Flowsheets (Taken 6/25/2025 1194)  Outcome Evaluation: A&OX1, disoriented to place, time, and situation. No c/o of pain or dicomfort. Consulted with cards, see notes. On tele. On 2L of oxygen via NC. On mag and K protocol. Strict I&O's. Will continue with plan of care.  Plan of Care Reviewed With:   patient   family  Overall Patient Progress: no change  Goal: Patient-Specific Goal (Individualized)  Description: You can add care plan individualizations to a care plan. Examples of Individualization might be:  \"Parent requests to be called daily at 9am for status\", \"I have a hard time hearing out of my right ear\", or \"Do not touch me to wake me up as it startles  me\".  Outcome: Progressing  Goal: Absence of Hospital-Acquired Illness or Injury  Outcome: Progressing  Intervention: Prevent Infection  Recent Flowsheet Documentation  Taken 6/25/2025 8099 by Antoinette Lopez  Infection Prevention: single patient room provided  Goal: Optimal Comfort and " Wellbeing  Outcome: Progressing  Goal: Readiness for Transition of Care  Outcome: Progressing     Problem: Delirium  Goal: Optimal Coping  Outcome: Progressing  Goal: Improved Behavioral Control  Outcome: Progressing  Goal: Improved Attention and Thought Clarity  Outcome: Progressing  Goal: Improved Sleep  Outcome: Progressing     Problem: Heart Failure  Goal: Optimal Coping  Outcome: Progressing  Goal: Optimal Cardiac Output  Outcome: Progressing  Goal: Stable Heart Rate and Rhythm  Outcome: Progressing  Goal: Fluid and Electrolyte Balance  Outcome: Progressing  Goal: Optimal Functional Ability  Outcome: Progressing  Goal: Improved Oral Intake  Outcome: Progressing  Goal: Effective Oxygenation and Ventilation  Outcome: Progressing  Goal: Effective Breathing Pattern During Sleep  Outcome: Progressing

## 2025-06-25 NOTE — PROGRESS NOTES
06/25/25 1400   Appointment Info   Signing Clinician's Name / Credentials (OT) Lia MercerRJ robersonRL   Living Environment   People in Home facility resident   Current Living Arrangements assisted living   Home Accessibility no concerns   Living Environment Comments Pt is a poor historian, unable to recall home set up, per chart review pt resides in an [facility]   Self-Care   Current Activity Tolerance fair   Regular Exercise No   Equipment Currently Used at Home walker, standard   Fall history within last six months no   Activity/Exercise/Self-Care Comment Pt is a poor historian, unable to recall prior level of function and how much assist she gets at baseline. Per chart review pt uses a walker   Instrumental Activities of Daily Living (IADL)   IADL Comments Appears pt has assist with IADLS at baseline   General Information   Onset of Illness/Injury or Date of Surgery 06/24/25   Referring Physician Kumar Ennis MD   Patient/Family Therapy Goal Statement (OT) Pt would like to rest   Additional Occupational Profile Info/Pertinent History of Current Problem 80 year old female with a history of COPD, hypertension, hypercholesteremia, and prediabetes who presents for evaluation of leg swelling and shortness of breath. Patient reports that she does not remember when the shortness of breath started. Denies any fever. To her knowledge, she has not been hospitalized for shortness of breath before. Patient's granddaughter denies any new or productive cough, although patient does have a cough at baseline due to a history of smoking. She adds that the leg swelling developed over the last week or so. Patient also has a large bruise on her right foot. Denies any memory of a fall or injury. Denies any pain in her foot and with movement of her ankle. Patient was sick with COVID a month ago, and had shortness of breath from that, but this had improved. Patient lives in a senior living facility and is mostly independent.   Existing  Precautions/Restrictions fall;oxygen therapy device and L/min   Limitations/Impairments safety/cognitive   Cognitive Status Examination   Orientation Status   (Pt able to report name, required cues for her birthday)   Affect/Mental Status (Cognitive) confused;low arousal/lethargic   Follows Commands follows one-step commands;50-74% accuracy   Memory Deficit severe deficit   Attention Deficit severe deficit   Executive Function Deficit severe deficit   Cognitive Status Comments Pt appears very lethargic this date requiring cues to open her eyes and attend to session. Pt has cognitive deficits at baseline per chart review   Visual Perception   Impact of Vision Impairment on Function (Vision) Difficult to assess due to pt inability to keep her eyes open. Per chart review pt has L eye retinal artery occlusion   Sensory   Sensory Comments Pt reports intact but does not feel the large bruise on her RLE   Pain Assessment   Patient Currently in Pain Yes, see Vital Sign flowsheet   Posture   Posture forward head position;protracted shoulders;kyphosis   Range of Motion Comprehensive   Comment, General Range of Motion Limited trunk control and UE ROM   Strength Comprehensive (MMT)   Comment, General Manual Muscle Testing (MMT) Assessment Global weakness   Coordination   Coordination Comments Impaired due to cognition   Bed Mobility   Comment (Bed Mobility) Mod assist   Transfers   Transfer Comments Unable to attempt this date due to fatigue and lethargy   Balance   Balance Comments Impaired in sitting   Activities of Daily Living   BADL Assessment/Intervention bathing;lower body dressing;grooming;toileting;upper body dressing   Bathing Assessment/Intervention   Comment, (Bathing) Dependent per clinical reasoning   Upper Body Dressing Assessment/Training   Comment, (Upper Body Dressing) Max assist   Lower Body Dressing Assessment/Training   Comment, (Lower Body Dressing) Max assist   Grooming Assessment/Training   Comment,  (Grooming) Mod assist in sitting   Toileting   Comment, (Toileting) Dependent via lift per clinical reasoning   Clinical Impression   Criteria for Skilled Therapeutic Interventions Met (OT) Yes, treatment indicated   OT Diagnosis Decline in ADL independence and safety   Influenced by the following impairments HF, cognitive decline   OT Problem List-Impairments impacting ADL problems related to;activity tolerance impaired;balance;cognition;coordination;mobility;range of motion (ROM);strength;pain;vision   Assessment of Occupational Performance 5 or more Performance Deficits   Identified Performance Deficits Impaired dressing bathing toileting and grooming independence with cognitive impairment   Planned Therapy Interventions (OT) ADL retraining;cognition;progressive activity/exercise   Clinical Decision Making Complexity (OT) detailed assessment/moderate complexity   Risk & Benefits of therapy have been explained evaluation/treatment results reviewed;risks/benefits reviewed;care plan/treatment goals reviewed;current/potential barriers reviewed;participants voiced agreement with care plan;participants included;patient   OT Total Evaluation Time   OT Eval, Moderate Complexity Minutes (36541) 8   OT Goals   Therapy Frequency (OT) 5 times/week   OT Predicted Duration/Target Date for Goal Attainment 07/04/25   OT Goals Hygiene/Grooming;Upper Body Dressing;Lower Body Dressing;Lower Body Bathing;Toilet Transfer/Toileting;Cognition   OT: Hygiene/Grooming supervision/stand-by assist;from wheelchair   OT: Upper Body Dressing Modified independent;using adaptive equipment   OT: Lower Body Dressing Modified independent;using adaptive equipment   OT: Lower Body Bathing Modified independent;using adaptive equipment   OT: Toilet Transfer/Toileting Modified independent;toilet transfer;cleaning and garment management;using adaptive equipment   OT: Cognitive Patient/caregiver will verbalize understanding of cognitive assessment  results/recommendations as needed for safe discharge planning   Self-Care/Home Management   Self-Care/Home Mgmt/ADL, Compensatory, Meal Prep Minutes (83657) 9   Symptoms Noted During/After Treatment (Meal Preparation/Planning Training) fatigue;increased pain   Treatment Detail/Skilled Intervention OT; Pt supine in bed, pt appears drowsy requiring repeated cues to attend to therapist and maintain open eyes. Pt completed supine to sit with mod assit for trunk support. Initially once EOB, able to maintain seated balanc iwht CGA. As pt fatigued requiring max assist attempting to lay down. Pt unable to recall if she walks or stands at baseline. Pt reporting fatigue requesting to lay down. Sit to supine wiht max assist for LE and mod assist to readjust in bed for lunch. Pt offered delirium prevention strategies including open blinds and reorientation. Pt in bed with alarm on and call light upon TO departure   OT Discharge Planning   OT Plan Ask family for PLOF, stand pivot with SS or Ax2, command following   OT Discharge Recommendation (DC Rec) Transitional Care Facility;Long term care facility   OT Rationale for DC Rec Patient is a poor historian and unable to recall level of assist or set up at baseline. If pt was not recieving assist from staff, she will require TCU to return to prior level of funciton. If pt has assist with all ADLs and mobility, she can return to facility, Ot will follow   OT Brief overview of current status Mod assist supine to sit, poor historian   OT Total Distance Amb During Session (feet) 0   Total Session Time   Timed Code Treatment Minutes 9   Total Session Time (sum of timed and untimed services) 17

## 2025-06-25 NOTE — CONSULTS
Care Management Initial Consult    General Information  Assessment completed with: Family, Bill  Type of CM/SW Visit: Initial Assessment    Primary Care Provider verified and updated as needed: Yes   Readmission within the last 30 days: no previous admission in last 30 days      Reason for Consult: care coordination/care conference, discharge planning  Advance Care Planning:            Communication Assessment  Patient's communication style: spoken language (English or Bilingual)    Hearing Difficulty or Deaf: no   Wear Glasses or Blind: yes    Cognitive  Cognitive/Neuro/Behavioral: .WDL except, level of consciousness  Level of Consciousness: intermittent confusion     Orientation: oriented x 4             Living Environment:   People in home: facility resident     Current living Arrangements: assisted living  Name of Facility: [facility]   Able to return to prior arrangements: yes       Family/Social Support:  Care provided by: other (see comments)  Provides care for: no one, unable/limited ability to care for self  Marital Status:   Support system: Sibling(s) (Niece)          Description of Support System: Supportive, Involved    Support Assessment: Adequate family and caregiver support    Current Resources:   Patient receiving home care services: No        Community Resources: None  Equipment currently used at home: walker, standard      Lifestyle & Psychosocial Needs:  Social Drivers of Health     Food Insecurity: Low Risk  (6/24/2025)    Food Insecurity     Within the past 12 months, did you worry that your food would run out before you got money to buy more?: No     Within the past 12 months, did the food you bought just not last and you didn t have money to get more?: No   Depression: Not at risk (9/17/2024)    [facility]    PHQ-2     PHQ-2 TOTAL SCORE: 0   Housing Stability: Low Risk  (6/24/2025)    Housing Stability     Do you have housing? : Yes     Are  you worried about losing your housing?: No   Tobacco Use: Medium Risk (11/19/2024)    Received from [facility]    Patient History     Smoking Tobacco Use: Former     Smokeless Tobacco Use: Never     Passive Exposure: Not on file   Financial Resource Strain: Low Risk  (6/24/2025)    Financial Resource Strain     Within the past 12 months, have you or your family members you live with been unable to get utilities (heat, electricity) when it was really needed?: No   Alcohol Use: Not on file   Transportation Needs: Low Risk  (6/24/2025)    Transportation Needs     Within the past 12 months, has lack of transportation kept you from medical appointments, getting your medicines, non-medical meetings or appointments, work, or from getting things that you need?: No   Physical Activity: Not on file   Interpersonal Safety: Low Risk  (6/24/2025)    Interpersonal Safety     Do you feel physically and emotionally safe where you currently live?: Yes     Within the past 12 months, have you been hit, slapped, kicked or otherwise physically hurt by someone?: No     Within the past 12 months, have you been humiliated or emotionally abused in other ways by your partner or ex-partner?: No   Stress: Not on file   Social Connections: Socially Integrated (9/17/2024)    Received from [facility]    Social Connections     Do you often feel lonely or isolated from those around you?: 0   Health Literacy: Not on file       Functional Status:  Prior to admission patient needed assistance:               Discussed  Partnership in Safe Discharge Planning  document with patient/family: No    Additional Information:  CM consulted for discharge planning needs. Patient was admitted with CHF exacerbation. She is being followed by Cardiology for recommendations and will be seen by Physical Therapy/Occupational Therapy. Chart review indicates patient is from [facility]ility.     Met with patient at bedside to confirm residence however patient was confused and could not answer questions asked. She was agreeable to having CM call her family to discuss home services and plan of care.    Attempted to call Rashida Marcelino X2 with messages left requesting a call back.    Attempted to call the [facility] Line [phone] X2 and messages left requesting a call back to discuss patient services. Still awaiting return call.     Call received from patient's brother Giorgio asking to meet with  to discuss plan of care moving forward for patient. Giorgio verified that patient lives at the [facility] in her own apartment. She gets meds and meals from them and some assistance with daily cares. He is not positive on all of the services she receives. He states patient is confused at baseline and has been getting more confused. He is agreeable to having CM speak to the [facility]. He will be at the hospital tomorrow morning and would like to talk to . Will update  on request and follow up with family on plan of care.    Cardiology notes indicate that patient is DNR/DNI and patient should be on comfort plan of care. He has spoken to patient's brother as well. Patient may benefit from a Palliative consult.     Next Steps: follow up with brothalecia Alvares regarding patient plan of care, he will be in hospital 6/26 at 1000. Follow up with the [facility] to verify services etc          Beryl Angel RN BSN CM  Inpatient Care Coordination  [facility]  [phone]

## 2025-06-25 NOTE — CONSULTS
[facility]    Cardiology Consultation     Date of Admission:  6/24/2025    Assessment & Plan   Acute systolic heart failure exacerbation with mild left ventricular systolic dysfunction  Ischemic cardiomyopathy  Moderate aortic stenosis  Moderate mitral regurgitation  Peripheral vascular disease with ischemic right foot  Dementia  COPD  Hypertension  Prediabetes mellitus  Central retinal artery occlusion    Patient is not on communicative.  Discussed the situation and prognosis with patient's brother and sister-in-law.  She is ready DNR DNI, both agree that we should initiate comfort measures.  Unfortunately blood pressure is too low, 100/39 for initiation of diuretics or beta-blockers.  Recommend holding lisinopril as well in view of hypotension.  No further recommendations from me at this time.  I will sign off, please call if any further questions    DR KOFI RODAS MD, MD    Primary Care Physician   Valerie Vincent    Reason for Consult   Reason for consult: I was asked by hospitalist service to evaluate this patient for congestive heart failure.    History of Present Illness   Berenice Krause is a 80 year old female who presents with bilateral lower limb edema which is worsening.  As far as patient was concerned she had no symptoms    PMH includes HFmrEF w/ interior & interolateral akinesis, COPD, hypertension, hyperlipidemia, prediabetes, central retinal artery occlusion of left eye and possible cognitive impairment.      In the emergency department the patient was afebrile. Her heart rate was . Blood pressures 117-154/. She was requiring 3L via nasal cannula to maintain oxygen saturations >92%.    Laboratory studies were notable for bicarb 31, BUN 30.7, creatinine 0.67, glucose 188, NT proBNP 13,371, troponin less than 6, WBC 12.2.    White cell count 11.6, hemoglobin 14.1, platelet count 218  Chest x-ray: interstitial prominence and hazy interstitial opacities.   ED  interventions: Lasix 40 mg IV and was admitted for further cares of suspected heart failure exacerbation    Echocardiogram from November 2024 demonstrated LVEF of 40 to 45%, inferior inferolateral wall motion abnormality, moderate aortic stenosis, moderate mitral regurgitation, normal RV function.  Echocardiogram on this current admission shows no major differences.  Patient is comatose.  History is obtained from her sister-in-law and brother.  Patient resides in an assisted living facility.  She has expressed desire to be managed conservatively prior to this current presentation.  She is DNR/DNI.  As far as her relatives are aware she has no complaints of chest pains or shortness of breath.  She had cardiac surgery performed by Dr. London as a child.  This is before the Voodoo of prosthetic valves I wonder if she may have had a mitral valve ostomy.  The reason she was brought in was because of bilateral ankle swelling.    Home medications include Advair discus, aspirin, atorvastatin 40 mg, cholecalciferol, lisinopril 20 mg daily    Past Medical History   Past Medical History:   Diagnosis Date    COPD (chronic obstructive pulmonary disease) (H)     Glaucoma     HTN (hypertension)     Malnutrition          Past Surgical History   No past surgical history on file.      Prior to Admission Medications   Prior to Admission Medications   Prescriptions Last Dose Informant Patient Reported? Taking?   ADVAIR DISKUS 250-50 MCG/DOSE inhaler 6/24/2025 Morning  No Yes   Sig: INHALE ONE PUFF BY MOUTH TWICE A DAY   aspirin 81 MG EC tablet 6/24/2025 Morning  No Yes   Sig: Take 1 tablet (81 mg) by mouth daily.   atorvastatin (LIPITOR) 40 MG tablet 6/23/2025 Evening  No Yes   Sig: Take 1 tablet (40 mg) by mouth every evening.   bimatoprost (LUMIGAN) 0.01 % SOLN 6/23/2025 Bedtime Self Yes Yes   Sig: Place 1 drop into both eyes At Bedtime   brimonidine (ALPHAGAN P) 0.1 % ophthalmic solution 6/24/2025 Morning Self Yes Yes   Sig: Place  1 drop into both eyes 2 times daily   cholecalciferol 50 MCG (2000 UT) CAPS 6/24/2025 Morning  Yes Yes   Sig: Take 2,000 Units by mouth daily.   dorzolamide-timolol (COSOPT) 2-0.5 % ophthalmic solution 6/24/2025 Morning Self Yes Yes   Sig: Place 1 drop into both eyes 2 times daily   lisinopril (ZESTRIL) 20 MG tablet 6/24/2025 Morning  No Yes   Sig: Take 1 tablet (20 mg) by mouth daily      Facility-Administered Medications: None     Current Facility-Administered Medications   Medication Dose Route Frequency Provider Last Rate Last Admin    aspirin EC tablet 81 mg  81 mg Oral Daily Kumar Ennis MD        atorvastatin (LIPITOR) tablet 40 mg  40 mg Oral QPM Kumar Ennis MD   40 mg at 06/24/25 2348    brimonidine (ALPHAGAN) 0.2 % ophthalmic solution 1 drop  1 drop Both Eyes BID Kumar Ennis MD   1 drop at 06/24/25 2132    calcium carbonate (TUMS) chewable tablet 1,000 mg  1,000 mg Oral 4x Daily PRN Kumar Ennis MD        Continuing ACE inhibitor/ARB/ARNI from home medication list OR ACE inhibitor/ARB/ARNI order already placed during this visit   Does not apply DOES NOT GO TO Kumar Loving MD        Continuing beta blocker from home medication list OR beta blocker order already placed during this visit   Does not apply DOES NOT GO TO Kumar Loving MD        dorzolamide-timolol (COSOPT) ophthalmic solution 1 drop  1 drop Both Eyes BID Kumar Ennis MD   1 drop at 06/25/25 0026    enoxaparin ANTICOAGULANT (LOVENOX) injection 30 mg  30 mg Subcutaneous Q24H Kumar Ennis MD        fluticasone-vilanterol (BREO ELLIPTA) 100-25 MCG/ACT inhaler 1 puff  1 puff Inhalation Daily Kumar Ennis MD   1 puff at 06/25/25 0827    furosemide (LASIX) injection 20 mg  20 mg Intravenous BID Juan Solis MD        hydrALAZINE (APRESOLINE) half-tab 5 mg  5 mg Oral Q4H PRN Kumar Ennis MD        Or    hydrALAZINE (APRESOLINE) injection 5 mg  5 mg Intravenous Q4H PRN Kumar Ennis MD        latanoprost (XALATAN) 0.005 % ophthalmic solution 1 drop  1 drop Both  Eyes At Bedtime Kumar Ennis MD   1 drop at 06/24/25 2138    lidocaine (LMX4) cream   Topical Q1H PRN Kumar Ennis MD        lidocaine 1 % 0.1-1 mL  0.1-1 mL Other Q1H PRN Kumar Ennis MD        lisinopril (ZESTRIL) tablet 20 mg  20 mg Oral At Bedtime Kumar Ennis MD   20 mg at 06/24/25 2348    ondansetron (ZOFRAN ODT) ODT tab 4 mg  4 mg Oral Q6H PRN Kumar Ennis MD        Or    ondansetron (ZOFRAN) injection 4 mg  4 mg Intravenous Q6H PRN Kumar Ennis MD        prochlorperazine (COMPAZINE) injection 5 mg  5 mg Intravenous Q6H PRN Kumar Ennis MD        Or    prochlorperazine (COMPAZINE) tablet 5 mg  5 mg Oral Q6H PRN Kumar Ennis MD        senna-docusate (SENOKOT-S/PERICOLACE) 8.6-50 MG per tablet 1 tablet  1 tablet Oral BID PRN Kumar Ennis MD        Or    senna-docusate (SENOKOT-S/PERICOLACE) 8.6-50 MG per tablet 2 tablet  2 tablet Oral BID PRN Kumar Ennis MD        sodium chloride (PF) 0.9% PF flush 3 mL  3 mL Intracatheter Q8H SHELDON Kumar Ennis MD   3 mL at 06/25/25 0623    sodium chloride (PF) 0.9% PF flush 3 mL  3 mL Intracatheter q1 min prn Kumar Ennis MD         Current Facility-Administered Medications   Medication Dose Route Frequency Provider Last Rate Last Admin    aspirin EC tablet 81 mg  81 mg Oral Daily Kumar Ennis MD        atorvastatin (LIPITOR) tablet 40 mg  40 mg Oral QPM Kumar Ennis MD   40 mg at 06/24/25 2348    brimonidine (ALPHAGAN) 0.2 % ophthalmic solution 1 drop  1 drop Both Eyes BID Kumar Ennis MD   1 drop at 06/24/25 2132    calcium carbonate (TUMS) chewable tablet 1,000 mg  1,000 mg Oral 4x Daily PRN Kumar Ennis MD        Continuing ACE inhibitor/ARB/ARNI from home medication list OR ACE inhibitor/ARB/ARNI order already placed during this visit   Does not apply DOES NOT GO TO Kumar Loving MD        Continuing beta blocker from home medication list OR beta blocker order already placed during this visit   Does not apply DOES NOT GO TO Kumar Loving MD        dorzolamide-timolol (COSOPT) ophthalmic solution 1  drop  1 drop Both Eyes BID Kumar Ennis MD   1 drop at 06/25/25 0026    enoxaparin ANTICOAGULANT (LOVENOX) injection 30 mg  30 mg Subcutaneous Q24H Kumar Ennis MD        fluticasone-vilanterol (BREO ELLIPTA) 100-25 MCG/ACT inhaler 1 puff  1 puff Inhalation Daily Kumar Ennis MD   1 puff at 06/25/25 0827    furosemide (LASIX) injection 20 mg  20 mg Intravenous BID Juan Solis MD        hydrALAZINE (APRESOLINE) half-tab 5 mg  5 mg Oral Q4H PRN Kumar Ennis MD        Or    hydrALAZINE (APRESOLINE) injection 5 mg  5 mg Intravenous Q4H PRN Kumar Ennis MD        latanoprost (XALATAN) 0.005 % ophthalmic solution 1 drop  1 drop Both Eyes At Bedtime Kumar Ennis MD   1 drop at 06/24/25 2138    lidocaine (LMX4) cream   Topical Q1H PRN Kumar Ennis MD        lidocaine 1 % 0.1-1 mL  0.1-1 mL Other Q1H PRN Kumar Ennis MD        lisinopril (ZESTRIL) tablet 20 mg  20 mg Oral At Bedtime Kumar Ennis MD   20 mg at 06/24/25 2348    ondansetron (ZOFRAN ODT) ODT tab 4 mg  4 mg Oral Q6H PRN Kumar Ennis MD        Or    ondansetron (ZOFRAN) injection 4 mg  4 mg Intravenous Q6H PRN Kumar Ennis MD        prochlorperazine (COMPAZINE) injection 5 mg  5 mg Intravenous Q6H PRN Kumar Ennis MD        Or    prochlorperazine (COMPAZINE) tablet 5 mg  5 mg Oral Q6H PRN Kumar Ennis MD        senna-docusate (SENOKOT-S/PERICOLACE) 8.6-50 MG per tablet 1 tablet  1 tablet Oral BID PRN Kumar Ennis MD        Or    senna-docusate (SENOKOT-S/PERICOLACE) 8.6-50 MG per tablet 2 tablet  2 tablet Oral BID PRN Kumar Ennis MD        sodium chloride (PF) 0.9% PF flush 3 mL  3 mL Intracatheter Q8H SHELDON Kumar Ennis MD   3 mL at 06/25/25 0623    sodium chloride (PF) 0.9% PF flush 3 mL  3 mL Intracatheter q1 min prn Kumar Ennis MD         Allergies   No Known Allergies    Social History    reports that she has quit smoking. She has quit using smokeless tobacco. She reports that she does not drink alcohol and does not use drugs.      Family History            Review of Systems   A  "comprehensive review of system was performed and is negative other than that noted in the HPI or here.     Physical Exam   Vital Signs with Ranges  Temp:  [97.6  F (36.4  C)-99.6  F (37.6  C)] 98  F (36.7  C)  Pulse:  [] 80  Resp:  [16-22] 18  BP: ()/() 100/39  SpO2:  [91 %-100 %] 100 %  Wt Readings from Last 4 Encounters:   06/25/25 35.5 kg (78 lb 4.2 oz)   11/04/24 35.8 kg (79 lb)   02/28/22 34.6 kg (76 lb 3.2 oz)   05/24/21 34.3 kg (75 lb 9.6 oz)     I/O last 3 completed shifts:  In: 480 [P.O.:480]  Out: 300 [Urine:300]      Vitals: BP (!) 100/39 (BP Location: Left arm)   Pulse 80   Temp 98  F (36.7  C) (Axillary)   Resp 18   Wt 35.5 kg (78 lb 4.2 oz)   LMP  (LMP Unknown)   SpO2 100%   BMI 14.31 kg/m      Physical Exam:   General - Alert and oriented to time place and person in no acute distress  Eyes - No scleral icterus  HEENT - Neck supple, moist mucous membranes  Cardiovascular -regular heart rhythm, cardiac apex displaced, 2 /6 systolic murmur at cardiac apex  Extremities - There is 1+ bilateral edema  Blood blister on right foot, significantly diminished foot pulses bilaterally  Respiratory -pectus carinatum with severe thoracic kyphoscoliosis, quiet breath sounds with no added wheezes or crackles  Skin - No pallor or cyanosis  Gastrointestinal - Non tender and non distended without rebound or guarding  Psych - Appropriate affect   Neurological - No gross motor neurological focal deficits    No lab results found in last 7 days.    Invalid input(s): \"TROPONINIES\"    Recent Labs   Lab 06/25/25  0721 06/24/25  1859 06/24/25  1345   WBC 11.6*  --  12.2*   HGB 14.1  --  14.9   MCV 96  --  95     --  279    145 142   POTASSIUM 4.1 3.9  3.9 3.8  3.8   CHLORIDE 100 102 103   CO2 36* 33* 31*   BUN 30.7* 25.5* 30.7*   CR 0.93 0.76  0.76 0.67   GFRESTIMATED 62 79  79 88   ANIONGAP 7 10 8   RICHARD 8.3* 8.8 8.6*   * 130* 188*   ALBUMIN 3.1*  --  3.6   PROTTOTAL 6.0*  --  6.7 " "  BILITOTAL 1.0  --  0.9   ALKPHOS 80  --  93   ALT 22  --  30   AST 28  --  25     Recent Labs   Lab Test 11/04/24  1856 02/28/22  0952   CHOL 188 213*   HDL 57 78   * 116*   TRIG 88 94     Recent Labs   Lab 06/25/25  0721 06/24/25  1345   WBC 11.6* 12.2*   HGB 14.1 14.9   HCT 44.5 46.5   MCV 96 95    279     No results for input(s): \"PH\", \"PHV\", \"PO2\", \"PO2V\", \"SAT\", \"PCO2\", \"PCO2V\", \"HCO3\", \"HCO3V\" in the last 168 hours.  Recent Labs   Lab 06/24/25  1345   NTBNP 13,371*     No results for input(s): \"DD\" in the last 168 hours.  No results for input(s): \"SED\", \"CRP\" in the last 168 hours.  Recent Labs   Lab 06/25/25  0721 06/24/25  1345    279     No results for input(s): \"TSH\" in the last 168 hours.  No results for input(s): \"COLOR\", \"APPEARANCE\", \"URINEGLC\", \"URINEBILI\", \"URINEKETONE\", \"SG\", \"UBLD\", \"URINEPH\", \"PROTEIN\", \"UROBILINOGEN\", \"NITRITE\", \"LEUKEST\", \"RBCU\", \"WBCU\" in the last 168 hours.    Imaging:  Recent Results (from the past 48 hours)   Chest XR,  PA & LAT    Narrative    EXAM: XR CHEST 2 VIEWS  LOCATION: [facility]  DATE: 6/24/2025    INDICATION: Dyspnea, BLE swelling.  COMPARISON: 1/23/2018.      Impression    IMPRESSION:   Cardiomegaly. Severe thoracic kyphosis limits assessment. Mild streaky opacity suggested at the lower lungs that could be mild airspace disease versus atelectasis. Bilateral interstitial prominence and hazy opacities suggestive of mild edema.   XR Foot Port Right 3 Views    Narrative    EXAM: XR FOOT PORT RIGHT 3 VIEWS  LOCATION: [facility]  DATE: 6/25/2025    INDICATION: large hematoma; r o underlying fracture  COMPARISON: None.      Impression    IMPRESSION: 3 views right foot. Marked soft tissue swelling about the dorsum of the metatarsals and tarsal metatarsal junction. No acute fractures seen. Right foot otherwise unremarkable. If continued clinical concern, additional cross-sectional imaging   should be " considered.       Echo:  No results found for this or any previous visit (from the past 4320 hours).    Clinically Significant Risk Factors Present on Admission               # Hypoalbuminemia: Lowest albumin = 3.1 g/dL at 6/25/2025  7:21 AM, will monitor as appropriate   # Drug Induced Platelet Defect: home medication list includes an antiplatelet medication   # Hypertension: Noted on problem list  # Heart failure, NOS: heart failure noted on the problem list and last echo with EF 40-50%              # Financial/Environmental Concerns:

## 2025-06-26 ENCOUNTER — APPOINTMENT (OUTPATIENT)
Dept: CT IMAGING | Facility: CLINIC | Age: 80
DRG: 291 | End: 2025-06-26
Attending: INTERNAL MEDICINE
Payer: MEDICARE

## 2025-06-26 VITALS
TEMPERATURE: 98.1 F | HEART RATE: 92 BPM | RESPIRATION RATE: 17 BRPM | WEIGHT: 84.22 LBS | BODY MASS INDEX: 15.4 KG/M2 | SYSTOLIC BLOOD PRESSURE: 120 MMHG | DIASTOLIC BLOOD PRESSURE: 60 MMHG | OXYGEN SATURATION: 93 %

## 2025-06-26 LAB
ANION GAP SERPL CALCULATED.3IONS-SCNC: 5 MMOL/L (ref 7–15)
BUN SERPL-MCNC: 40.3 MG/DL (ref 8–23)
CALCIUM SERPL-MCNC: 7.9 MG/DL (ref 8.8–10.4)
CHLORIDE SERPL-SCNC: 97 MMOL/L (ref 98–107)
CREAT SERPL-MCNC: 1.09 MG/DL (ref 0.51–0.95)
EGFRCR SERPLBLD CKD-EPI 2021: 51 ML/MIN/1.73M2
ERYTHROCYTE [DISTWIDTH] IN BLOOD BY AUTOMATED COUNT: 14.6 % (ref 10–15)
GLUCOSE SERPL-MCNC: 107 MG/DL (ref 70–99)
HCO3 SERPL-SCNC: 34 MMOL/L (ref 22–29)
HCT VFR BLD AUTO: 41.6 % (ref 35–47)
HGB BLD-MCNC: 13.4 G/DL (ref 11.7–15.7)
MAGNESIUM SERPL-MCNC: 2.4 MG/DL (ref 1.7–2.3)
MCH RBC QN AUTO: 30.6 PG (ref 26.5–33)
MCHC RBC AUTO-ENTMCNC: 32.2 G/DL (ref 31.5–36.5)
MCV RBC AUTO: 95 FL (ref 78–100)
NT-PROBNP SERPL-MCNC: 5847 PG/ML (ref 0–624)
PLATELET # BLD AUTO: 205 10E3/UL (ref 150–450)
POTASSIUM SERPL-SCNC: 3.6 MMOL/L (ref 3.4–5.3)
RBC # BLD AUTO: 4.38 10E6/UL (ref 3.8–5.2)
SODIUM SERPL-SCNC: 136 MMOL/L (ref 135–145)
WBC # BLD AUTO: 10.5 10E3/UL (ref 4–11)

## 2025-06-26 PROCEDURE — 85014 HEMATOCRIT: CPT | Performed by: INTERNAL MEDICINE

## 2025-06-26 PROCEDURE — 999N000157 HC STATISTIC RCP TIME EA 10 MIN

## 2025-06-26 PROCEDURE — 99233 SBSQ HOSP IP/OBS HIGH 50: CPT | Performed by: INTERNAL MEDICINE

## 2025-06-26 PROCEDURE — 99222 1ST HOSP IP/OBS MODERATE 55: CPT | Performed by: NURSE PRACTITIONER

## 2025-06-26 PROCEDURE — 71250 CT THORAX DX C-: CPT

## 2025-06-26 PROCEDURE — 83880 ASSAY OF NATRIURETIC PEPTIDE: CPT | Performed by: INTERNAL MEDICINE

## 2025-06-26 PROCEDURE — 83735 ASSAY OF MAGNESIUM: CPT | Performed by: INTERNAL MEDICINE

## 2025-06-26 PROCEDURE — 94640 AIRWAY INHALATION TREATMENT: CPT

## 2025-06-26 PROCEDURE — 250N000013 HC RX MED GY IP 250 OP 250 PS 637: Performed by: INTERNAL MEDICINE

## 2025-06-26 PROCEDURE — 120N000001 HC R&B MED SURG/OB

## 2025-06-26 PROCEDURE — 36415 COLL VENOUS BLD VENIPUNCTURE: CPT | Performed by: INTERNAL MEDICINE

## 2025-06-26 PROCEDURE — 250N000013 HC RX MED GY IP 250 OP 250 PS 637: Performed by: STUDENT IN AN ORGANIZED HEALTH CARE EDUCATION/TRAINING PROGRAM

## 2025-06-26 PROCEDURE — 80048 BASIC METABOLIC PNL TOTAL CA: CPT | Performed by: INTERNAL MEDICINE

## 2025-06-26 PROCEDURE — 250N000011 HC RX IP 250 OP 636: Performed by: STUDENT IN AN ORGANIZED HEALTH CARE EDUCATION/TRAINING PROGRAM

## 2025-06-26 RX ORDER — ACETAMINOPHEN 500 MG
1000 TABLET ORAL EVERY 6 HOURS PRN
Status: DISCONTINUED | OUTPATIENT
Start: 2025-06-26 | End: 2025-07-03 | Stop reason: HOSPADM

## 2025-06-26 RX ORDER — METHOCARBAMOL 750 MG/1
750 TABLET, FILM COATED ORAL ONCE
Status: COMPLETED | OUTPATIENT
Start: 2025-06-26 | End: 2025-06-26

## 2025-06-26 RX ORDER — POTASSIUM CHLORIDE 750 MG/1
10 TABLET, EXTENDED RELEASE ORAL ONCE
Status: COMPLETED | OUTPATIENT
Start: 2025-06-26 | End: 2025-06-26

## 2025-06-26 RX ADMIN — ASPIRIN 81 MG: 81 TABLET, DELAYED RELEASE ORAL at 08:48

## 2025-06-26 RX ADMIN — FLUTICASONE FUROATE AND VILANTEROL TRIFENATATE 1 PUFF: 100; 25 POWDER RESPIRATORY (INHALATION) at 08:04

## 2025-06-26 RX ADMIN — POTASSIUM CHLORIDE 10 MEQ: 750 TABLET, FILM COATED, EXTENDED RELEASE ORAL at 10:54

## 2025-06-26 RX ADMIN — METHOCARBAMOL 750 MG: 750 TABLET ORAL at 21:18

## 2025-06-26 RX ADMIN — DORZOLAMIDE HYDROCHLORIDE AND TIMOLOL MALEATE 1 DROP: 20; 5 SOLUTION OPHTHALMIC at 08:46

## 2025-06-26 RX ADMIN — ATORVASTATIN CALCIUM 40 MG: 40 TABLET, FILM COATED ORAL at 21:19

## 2025-06-26 RX ADMIN — DORZOLAMIDE HYDROCHLORIDE AND TIMOLOL MALEATE 1 DROP: 20; 5 SOLUTION OPHTHALMIC at 21:25

## 2025-06-26 RX ADMIN — ENOXAPARIN SODIUM 30 MG: 30 INJECTION SUBCUTANEOUS at 18:19

## 2025-06-26 RX ADMIN — BRIMONIDINE TARTRATE 1 DROP: 2 SOLUTION/ DROPS OPHTHALMIC at 08:49

## 2025-06-26 RX ADMIN — LATANOPROST 1 DROP: 50 SOLUTION OPHTHALMIC at 21:25

## 2025-06-26 RX ADMIN — BRIMONIDINE TARTRATE 1 DROP: 2 SOLUTION/ DROPS OPHTHALMIC at 21:25

## 2025-06-26 ASSESSMENT — ACTIVITIES OF DAILY LIVING (ADL)
ADLS_ACUITY_SCORE: 40
ADLS_ACUITY_SCORE: 48
ADLS_ACUITY_SCORE: 43
ADLS_ACUITY_SCORE: 37
ADLS_ACUITY_SCORE: 47
ADLS_ACUITY_SCORE: 37
ADLS_ACUITY_SCORE: 37
ADLS_ACUITY_SCORE: 47
ADLS_ACUITY_SCORE: 37
ADLS_ACUITY_SCORE: 37
ADLS_ACUITY_SCORE: 40
ADLS_ACUITY_SCORE: 37
ADLS_ACUITY_SCORE: 37
ADLS_ACUITY_SCORE: 38
ADLS_ACUITY_SCORE: 37
ADLS_ACUITY_SCORE: 43
ADLS_ACUITY_SCORE: 40
ADLS_ACUITY_SCORE: 43
ADLS_ACUITY_SCORE: 37
ADLS_ACUITY_SCORE: 48
ADLS_ACUITY_SCORE: 38
ADLS_ACUITY_SCORE: 43
ADLS_ACUITY_SCORE: 40

## 2025-06-26 NOTE — PLAN OF CARE
"Alert to only self.on tele SR with BBB.on 2L NC.on K,Mg protocols.voided 750mL urine.chest barrell.metroplol held due to soft BP.plan is for a possible comfort care.      Goal Outcome Evaluation:      Plan of Care Reviewed With: patient    Overall Patient Progress: improvingOverall Patient Progress: improving           Problem: Adult Inpatient Plan of Care  Goal: Plan of Care Review  Description: The Plan of Care Review/Shift note should be completed every shift.  The Outcome Evaluation is a brief statement about your assessment that the patient is improving, declining, or no change.  This information will be displayed automatically on your shift  note.  Outcome: Not Progressing  Flowsheets (Taken 6/26/2025 0400)  Plan of Care Reviewed With: patient  Overall Patient Progress: improving  Goal: Patient-Specific Goal (Individualized)  Description: You can add care plan individualizations to a care plan. Examples of Individualization might be:  \"Parent requests to be called daily at 9am for status\", \"I have a hard time hearing out of my right ear\", or \"Do not touch me to wake me up as it startles  me\".  Outcome: Not Progressing  Goal: Absence of Hospital-Acquired Illness or Injury  Outcome: Not Progressing  Intervention: Identify and Manage Fall Risk  Recent Flowsheet Documentation  Taken 6/25/2025 2101 by lEoise Light RN  Safety Promotion/Fall Prevention:   clutter free environment maintained   safety round/check completed  Goal: Optimal Comfort and Wellbeing  Outcome: Not Progressing  Goal: Readiness for Transition of Care  Outcome: Not Progressing     Problem: Delirium  Goal: Optimal Coping  Outcome: Not Progressing  Goal: Improved Behavioral Control  Outcome: Not Progressing  Goal: Improved Attention and Thought Clarity  Outcome: Not Progressing  Goal: Improved Sleep  Outcome: Not Progressing         "

## 2025-06-26 NOTE — PLAN OF CARE
"Goal Outcome Evaluation:      Plan of Care Reviewed With: patient    Overall Patient Progress: decliningOverall Patient Progress: declining    Outcome Evaluation: Confused, SOB at rest. Denied pain. Postvoid bladder scan 615, st cath per protocol.      Problem: Adult Inpatient Plan of Care  Goal: Plan of Care Review  Description: The Plan of Care Review/Shift note should be completed every shift.  The Outcome Evaluation is a brief statement about your assessment that the patient is improving, declining, or no change.  This information will be displayed automatically on your shift  note.  Outcome: Not Progressing  Flowsheets (Taken 6/26/2025 1359)  Outcome Evaluation: Confused, SOB at rest. Denied pain. Postvoid bladder scan 615, st cath per protocol.  Plan of Care Reviewed With: patient  Overall Patient Progress: declining  Goal: Patient-Specific Goal (Individualized)  Description: You can add care plan individualizations to a care plan. Examples of Individualization might be:  \"Parent requests to be called daily at 9am for status\", \"I have a hard time hearing out of my right ear\", or \"Do not touch me to wake me up as it startles  me\".  Outcome: Not Progressing  Goal: Absence of Hospital-Acquired Illness or Injury  Outcome: Not Progressing  Intervention: Identify and Manage Fall Risk  Recent Flowsheet Documentation  Taken 6/26/2025 0900 by Romana Arora RN  Safety Promotion/Fall Prevention:   activity supervised   patient and family education   supervised activity   toileting scheduled  Intervention: Prevent Skin Injury  Recent Flowsheet Documentation  Taken 6/26/2025 0900 by Romana Arora RN  Body Position: position changed independently  Intervention: Prevent and Manage VTE (Venous Thromboembolism) Risk  Recent Flowsheet Documentation  Taken 6/26/2025 0900 by Romana Arora RN  VTE Prevention/Management: SCDs on (sequential compression devices)  Goal: Optimal Comfort and Wellbeing  Outcome: Not " Progressing  Goal: Readiness for Transition of Care  Outcome: Not Progressing     Problem: Delirium  Goal: Optimal Coping  Outcome: Not Progressing  Goal: Improved Behavioral Control  Outcome: Not Progressing  Intervention: Minimize Safety Risk  Recent Flowsheet Documentation  Taken 6/26/2025 0900 by Romana Arora RN  Enhanced Safety Measures: pain management  Goal: Improved Attention and Thought Clarity  Outcome: Not Progressing  Goal: Improved Sleep  Outcome: Not Progressing     Problem: Heart Failure  Goal: Optimal Coping  Outcome: Not Progressing  Goal: Optimal Cardiac Output  Outcome: Not Progressing  Goal: Stable Heart Rate and Rhythm  Outcome: Not Progressing  Goal: Fluid and Electrolyte Balance  Outcome: Not Progressing  Goal: Optimal Functional Ability  Outcome: Not Progressing  Intervention: Optimize Functional Ability  Recent Flowsheet Documentation  Taken 6/26/2025 0900 by Romana Arora RN  Activity Management: activity adjusted per tolerance  Goal: Improved Oral Intake  Outcome: Not Progressing  Goal: Effective Oxygenation and Ventilation  Outcome: Not Progressing  Intervention: Promote Airway Secretion Clearance  Recent Flowsheet Documentation  Taken 6/26/2025 0900 by Romana Arora RN  Activity Management: activity adjusted per tolerance  Goal: Effective Breathing Pattern During Sleep  Outcome: Not Progressing

## 2025-06-26 NOTE — CONSULTS
Palliative Care Consultation Note  [facility]      Patient: Berenice Krause  Date of Admission:  6/24/2025    Requesting Clinician / Team: hospitalist, Dr. Juan Slois  Reason for consult: Goals of care  Patient and family support       Recommendations & Counseling     GOALS OF CARE:   Life-prolonging with limits   Family care conference with Berenice's brother Giorgio and sister-in-law Anna on Friday 6/27 at 1000.    ADVANCE CARE PLANNING:  Advance Directive has been requested - Giorgio has copy of HCD at home and will bring it in tomorrow at the family conference.    There is a POLST form on file, but will need to be updated prior to discharge.  Code status: No CPR- Do NOT Intubate    MEDICAL MANAGEMENT:   We are not actively managing symptoms at this time.    PSYCHOSOCIAL/SPIRITUAL SUPPORT:  Family : Brother Giorgio, sister in law Anna and candida Chari.  Berenice is not  and did not have children.  Has a sister with whom she is estranged.    Berenice lived next door to Giorgio and Anna prior to her move to [facility] [place].  Giorgio states she sold her house to Chari Field. States they are very close and they look after her and assist her with finances.   Susanne community: [employer]     Palliative Care will continue to follow. Thank you for the consult and allowing us to aid in the care of Berenice Krause.    These recommendations have been discussed with patient's family, hospitalist, nursing staff, [facility].    ULI Noel CNP  [facility], Palliative Care  Securely message with the [employer] Mobile Web Console (learn more here) or  Text page via 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  She presented on 6/24 with shortness of breath and BLE edema, thought to  "be due to HF exacerbation.  Admitted for further work up and medical management.    Patient on diuretics at baseline, labs indicated elevated Cr question diuretic intolerance.  Cardiology consulted.  Per hospitalist conversation, patient has not shown interest in further interventions.  Patient has become more confused throughout hospitalization with significant change on 6/26.    Today, the patient was seen for:  Introduction to palliative care  Goals of care  Patient and family support    History of Present Illness   Met with Berenice at the bedside.  She is significantly confused inquiring: \"where am I?\", \"should I be going home now?\", \"why am I here?\".  Addressed her questions and updated her that her brother Giorgio will be contacted.    Called and spoke to Giorgio over the phone.     I introduced our role as an extra layer of support and how we help patients and families dealing with serious, potentially life-limiting illnesses. I explained the composition of the palliative care team.  Palliative care helps patients and families navigate their care while focusing on the whole person; providing emotional, social and spiritual support  Palliative care often assists with symptom management, information sharing about what to expect from the illness, available treatment options and what effect those options may have on the disease course, and provide effective communication and caring support.    Giorgio outlined recent functional decline that Berenice has been going through.  He states that there has been marked decline for the past 2 weeks.  He states that she was fairly independent when she first moved to [facility] [place] (7 years ago), but that her dementia has progressed significantly and she has required more and more assistance.  He states that he and his wife support her and manage all of her finances.  He voices concern that today especially, he sees that she has significantly declined.  He wants to review the plan of care.  " States he will bring in health care directive tomorrow.    Prognosis, Goals, & Planning:   Functional Status just prior to this current hospitalization:  Outpatient Palliative Performance Score (PPS) 60%  Significant disease.  Normal or reduced intake. Normal LOC or confusion. Reduced ambulation, some assist w/self-care, unable to work/do housework.      Prognosis, Goals, and/or Advance Care Planning:  We discussed general treatment options (full/restorative, selective/conservatives, and comfort only/hospice). Giorgio wishes to hold family conference to review goals of care.  No change to plan of care today    Code Status was addressed today:   No : code status established    Patient's decision making preferences: unable to assess        Patient has decision-making capacity today for complex decisions: Lacks decision making capacity - cognitive impairment at baseline, acute worsening, not oriented or able to hold insight to current medical condition          Coping, Meaning, & Spirituality:   Mood, coping, and/or meaning in the context of serious illness were addressed today: No    Social:   Living situation: resides in assisted living [facility]  Important relationships/caregivers: Close to her brother Giorgio, his wife Anna and niece Chari.  Lived many years next door to Giorgio, then sold her house to her niece when she moved into [facility] [place] 7 years ago.    Medications:  Reviewed this patient's medication profile and medications from this hospitalization.   [place] Board of Pharmacy Data Base Reviewed: Yes:   reviewed - no record of controlled substances prescribed..    ROS:  Comprehensive ROS is reviewed and is negative except as here & per HPI:     Physical Exam   Vital Signs with Ranges  Temp:  [97.6  F (36.4  C)-98.9  F (37.2  C)] 97.6  F (36.4  C)  Pulse:  [72-88] 85  Resp:  [16-18] 16  BP: ()/(43-48) 100/43  SpO2:  [89 %-95 %] 92 %  Wt Readings from Last 10 Encounters:   06/26/25 38.2 kg (84 lb  3.5 oz)   11/04/24 35.8 kg (79 lb)   02/28/22 34.6 kg (76 lb 3.2 oz)   05/24/21 34.3 kg (75 lb 9.6 oz)   02/02/18 36.7 kg (80 lb 12.8 oz)   01/29/18 36.7 kg (81 lb)   01/21/18 36 kg (79 lb 6.4 oz)     84 lbs 3.45 oz    PHYSICAL EXAM:  Constitutional: healthy, alert, and mild distress   Cardiovascular: RRR, BLE edema  Respiratory: no increased work of breathing  Psychiatric: anxious and confused  Abdomen: Abdomen soft, non-tender.  NEURO: no focal deficits, not oriented.    Data reviewed:  Recent Results (from the past 24 hours)   Magnesium   Result Value Ref Range    Magnesium 2.4 (H) 1.7 - 2.3 mg/dL   Basic metabolic panel   Result Value Ref Range    Sodium 136 135 - 145 mmol/L    Potassium 3.6 3.4 - 5.3 mmol/L    Chloride 97 (L) 98 - 107 mmol/L    Carbon Dioxide (CO2) 34 (H) 22 - 29 mmol/L    Anion Gap 5 (L) 7 - 15 mmol/L    Urea Nitrogen 40.3 (H) 8.0 - 23.0 mg/dL    Creatinine 1.09 (H) 0.51 - 0.95 mg/dL    GFR Estimate 51 (L) >60 mL/min/1.73m2    Calcium 7.9 (L) 8.8 - 10.4 mg/dL    Glucose 107 (H) 70 - 99 mg/dL   NT-proBNP   Result Value Ref Range    NT-proBNP 5,847 (H) 0 - 624 pg/mL   CBC with platelets   Result Value Ref Range    WBC Count 10.5 4.0 - 11.0 10e3/uL    RBC Count 4.38 3.80 - 5.20 10e6/uL    Hemoglobin 13.4 11.7 - 15.7 g/dL    Hematocrit 41.6 35.0 - 47.0 %    MCV 95 78 - 100 fL    MCH 30.6 26.5 - 33.0 pg    MCHC 32.2 31.5 - 36.5 g/dL    RDW 14.6 10.0 - 15.0 %    Platelet Count 205 150 - 450 10e3/uL       Medical Decision Making       60 MINUTES SPENT BY ME on the date of service doing chart review, history, exam, documentation & further activities per the note.

## 2025-06-26 NOTE — PROGRESS NOTES
"Care Management Follow Up    Length of Stay (days): 2    Expected Discharge Date: 06/28/2025     Concerns to be Addressed: discharge planning     Patient plan of care discussed at interdisciplinary rounds: Yes    Education Provided on the Discharge Plan:    Patient/Family in Agreement with the Plan: yes    Discussed  Partnership in Safe Discharge Planning  document with patient/family: No     Handoff Completed: No, handoff not indicated or clinically appropriate    Additional Information:  Met with pt, brother and sister-in-law at bedside. Therapy is recommending TCU vs LTC. Pt resides at [facility]. Pt states she has no desire to go to TCU to get stronger, she just \"wants to drink her coffee.\" Pt does appear to have some basic understanding of Hospice and she said she just wants to be comfortable. Requested a Palliative Care consult.  Call placed to [facility] to obtain baseline information, waiting call back.    Next Steps: Follow up with [facility]. Continue to follow for discharge planning.    ADDENDUM: [facility] returned call to verify AL services:  Patient receives services as follows: Med management, escort to meals, bathing, toileting.    [facility] contact (name/number): Sherron [phone]  Fax #: [phone]  Barriers to pt returning: [facility] will likely need to complete an assessment before returning home.  Baseline mobility: Up independently with her walker  Time of preferred return: M-F before 1400  New meds/prescriptions/Pharmacy: A&E  Transportation: TBD    Other: Informed [facility] that Palliative Care will be meeting with pt tomorrow at 1000 to assist with goals of care. [facility] currently does not have a Care Suite opening, would need to complete an assessment to determine if they would be able to support pt in her own apartment with Care Suite level of care services.    RN CC/SW will continue to follow for discharge planning and return to facility.     Zuleima Bright RN   Inpatient Care " Coordination  [facility]   Phone: [phone]

## 2025-06-26 NOTE — PLAN OF CARE
"Goal Outcome Evaluation:      Plan of Care Reviewed With: patient    Overall Patient Progress: no changeOverall Patient Progress: no change    Outcome Evaluation: confused, occasionally tries to get out of bed but redirectable, CT lungs done, o2 continues        Problem: Adult Inpatient Plan of Care  Goal: Plan of Care Review  Description: The Plan of Care Review/Shift note should be completed every shift.  The Outcome Evaluation is a brief statement about your assessment that the patient is improving, declining, or no change.  This information will be displayed automatically on your shift  note.  Outcome: Progressing  Flowsheets (Taken 6/26/2025 4227)  Outcome Evaluation: confused, occasionally tries to get out of bed but redirectable, CT lungs done, o2 continues  Plan of Care Reviewed With: patient  Overall Patient Progress: no change  Goal: Patient-Specific Goal (Individualized)  Description: You can add care plan individualizations to a care plan. Examples of Individualization might be:  \"Parent requests to be called daily at 9am for status\", \"I have a hard time hearing out of my right ear\", or \"Do not touch me to wake me up as it startles  me\".  Outcome: Progressing  Goal: Absence of Hospital-Acquired Illness or Injury  Outcome: Progressing  Goal: Optimal Comfort and Wellbeing  Outcome: Progressing  Goal: Readiness for Transition of Care  Outcome: Progressing     Problem: Delirium  Goal: Optimal Coping  Outcome: Progressing  Goal: Improved Behavioral Control  Outcome: Progressing  Goal: Improved Attention and Thought Clarity  Outcome: Progressing  Goal: Improved Sleep  Outcome: Progressing     Problem: Heart Failure  Goal: Optimal Coping  Outcome: Progressing  Goal: Optimal Cardiac Output  Outcome: Progressing  Goal: Stable Heart Rate and Rhythm  Outcome: Progressing  Goal: Fluid and Electrolyte Balance  Outcome: Progressing  Goal: Optimal Functional Ability  Outcome: Progressing  Goal: Improved Oral " Intake  Outcome: Progressing  Goal: Effective Oxygenation and Ventilation  Outcome: Progressing  Goal: Effective Breathing Pattern During Sleep  Outcome: Progressing

## 2025-06-26 NOTE — PROGRESS NOTES
[facility]    Medicine Progress Note - Hospitalist Service    Date of Admission:  6/24/2025    Assessment & Plan   Berenice Krause is a 80 year old female admitted on 6/24/2025 lower extremity swelling and SOB due thought due to exacerbation of HFmrEF. He was also found to have an apparent large hematoma over the dorsum of the right foot.     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     In the emergency department the patient was afebrile. Her heart rate was . Blood pressures 117-154/. She was requiring 3L via nasal cannula to maintain oxygen saturations >92%.    Laboratory studies were notable for bicarb 31, BUN 30.7, creatinine 0.67, glucose 188, NT proBNP 13,371, troponin less than 6, WBC 12.2.    ECG: sinus tachycardia, but without ST segment dynamic changes.  She did have some PACs as well as T wave inversions in leads III, V1, and V2; morphology is similar to prior ECG obtained 11/2024.    Chest x-ray: interstitial prominence and hazy interstitial opacities.   ED interventions: Lasix 40 mg IV and was admitted for further cares of suspected heart failure exacerbation.     Ms. Krause was admitted to a medical bed on telemetry after having received a dose of Furosemide 40 mg IV in the ED. She got a second dose before it was noted that her creatinine was rising and her BP was falling. With these findings, Lisinopril was reduced to 2.5 mg daily with parameters to hold and the lasix was stopped.     Her Echo is not significantly different than the one completed in 11/2024, but note that she has both reduced EF and multiple moderate valvular insufficiencies.     Most notably, though, the pt is quite limited in function. She walks several feet, but with a walker and assist. Family has recognized that she has developed significant memory  "deficits. She is unable to answer many questions about her preferences for longer term goals of care. She appears to make sense, but mostly seems to avoid answering. Her brother and SINAN are close and involved family members.  They are willing to be decision makers for her, but they are not \"designated\" POA.     DX:  SOB and peripheral edema in the setting of known HFmrEF, moderate AORTIC STENOSIS and COPD (not oxygen-dependent). NT-BNP 13K and TTE from 11/2024 40-45%. Echo obtained hospital stay unchanged from previous. Mild hypoxia may be related to CHF v poss infiltrates.  Right foot hematoma. This is superficial and does NOT look acute. It looks to have significant edema involved in the flaccid fullness. NO PULSE IS FOUND WITH DOPPLER, BUT PT DENIES PAIN AND IS ABLE TO MOVE HER FOOT WITHOUT PROBLEMS.  HARJIT due to intolerance to diuresis. This bodes poorly for CHF management. Baseline creat suggests slightly better than actual GFR based on GFR est on Cystatin C.  Mild hyperglycemia in the setting of pre-diabetes NIDDM. HbA1c 6.0.   Leukocytosis with left shift.  Resolved. CRP 11 and Procalcitonin 0.12 - not consistent with significant infection.   HTN.  Currently relatively hypotensive.  Central retinal artery occlusion, left eye.   Cognitive impairment is suggested in Dr. Ennis' admission note, and although she appears to be able to seem appropriate, she evades questions that are difficult and often is frankly confused. 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   Severe malnutrition, suspect that this is related to undiagnosed aspiration. I suspect this is due to chronic aspiration.  She seems to cough with every swallow and has a wet cough with eating. She has known diffuse changes on MRI of the Brain \"typically associated with chronic hypertension or amyloid angiopathy\".   Goals of care. Discussed with family that the pt is likely at least, to need to return to the hospital relatively frequently due to her many combined " issues.   PLAN:  Cont to hold diuretics and hold reduced dose of Lisinopril due to worsening HARJIT.  Other usual home meds are resumed.   Briefly discussed with the pt the cardiologist's assessment. At this time, the pt is not interested in considering hospice, but avoiding interventions is confirmed. Palliative care consultation to help family with planning.  CT Chest without contrast. Need to establish whether she is aspirating, has COPD or whether this is all CHF.           Diet: Combination Diet 2 gm NA Diet; Low Saturated Fat Na <2400mg Diet, No Caffeine Diet  Fluid restriction 1800 ML FLUID    DVT Prophylaxis: Enoxaparin (Lovenox) SQ  Herbert Catheter: Not present  Lines: None     Cardiac Monitoring: ACTIVE order. Indication: Acute decompensated heart failure (48 hours)  Code Status: No CPR- Do NOT Intubate      Clinically Significant Risk Factors          # Hypochloremia: Lowest Cl = 97 mmol/L in last 2 days, will monitor as appropriate      # Hypoalbuminemia: Lowest albumin = 3.1 g/dL at 6/25/2025  7:21 AM, will monitor as appropriate     # Hypertension: Noted on problem list    # Heart failure, NOS: heart failure noted on the problem list and last echo with EF 40-50%               # Financial/Environmental Concerns:           Social Drivers of Health    Tobacco Use: Medium Risk (11/19/2024)    [facility]    Patient History     Smoking Tobacco Use: Former     Smokeless Tobacco Use: Never          Disposition Plan     Medically Ready for Discharge: Anticipated in 2-4 Days      Juan Solis MD  Hospitalist Service  [facility]  Securely message with [employer] (more info)  Text page via [facility] Paging/Directory   ______________________________________________________________________    Interval History   Pt wakeful and interactive today, but clearly not oriented to place or situation. Brother and SINAN present at bedside.     Physical Exam   Vital  Signs: Temp: 97.6  F (36.4  C) Temp src: Oral BP: 100/43 Pulse: 85   Resp: 16 SpO2: 92 % O2 Device: Nasal cannula Oxygen Delivery: 2 LPM  Weight: 84 lbs 3.45 oz    General Appearance: Very thin, frail, elderly woman in NAD. Calm in bed on O2 at 2 LPM by NC.  Severe kyphosis noted. Marked decrease in muscular bulk and subcutaneous fat.   Respiratory: No increased WOB. Decreased air entry at the left posterior. No obvious rales or wheeze.   Cardiovascular: RRR with soft systolic Aortic as well as Tricuspid area murmurs.   GI: soft, NTND  Skin: the right foot is bruised-appearing with purple discoloration and flaccid edema over the dorsum of the foot. I am unable to find a pulse even with Doppler device.   Trace edema otherwise. Decreased muscular bulk noted over the upper mmore so than the lower extremities.   Other:  Denies areas of pain.     Medical Decision Making       45 MINUTES SPENT BY ME on the date of service doing chart review, history, exam, documentation & further activities per the note.      Data   Recent Results (from the past 24 hours)   Magnesium   Result Value Ref Range    Magnesium 2.4 (H) 1.7 - 2.3 mg/dL   Basic metabolic panel   Result Value Ref Range    Sodium 136 135 - 145 mmol/L    Potassium 3.6 3.4 - 5.3 mmol/L    Chloride 97 (L) 98 - 107 mmol/L    Carbon Dioxide (CO2) 34 (H) 22 - 29 mmol/L    Anion Gap 5 (L) 7 - 15 mmol/L    Urea Nitrogen 40.3 (H) 8.0 - 23.0 mg/dL    Creatinine 1.09 (H) 0.51 - 0.95 mg/dL    GFR Estimate 51 (L) >60 mL/min/1.73m2    Calcium 7.9 (L) 8.8 - 10.4 mg/dL    Glucose 107 (H) 70 - 99 mg/dL   NT-proBNP   Result Value Ref Range    NT-proBNP 5,847 (H) 0 - 624 pg/mL   CBC with platelets   Result Value Ref Range    WBC Count 10.5 4.0 - 11.0 10e3/uL    RBC Count 4.38 3.80 - 5.20 10e6/uL    Hemoglobin 13.4 11.7 - 15.7 g/dL    Hematocrit 41.6 35.0 - 47.0 %    MCV 95 78 - 100 fL    MCH 30.6 26.5 - 33.0 pg    MCHC 32.2 31.5 - 36.5 g/dL    RDW 14.6 10.0 - 15.0 %    Platelet Count  205 150 - 450 10e3/uL

## 2025-06-27 LAB
ANION GAP SERPL CALCULATED.3IONS-SCNC: 4 MMOL/L (ref 7–15)
BUN SERPL-MCNC: 32.3 MG/DL (ref 8–23)
CALCIUM SERPL-MCNC: 8 MG/DL (ref 8.8–10.4)
CHLORIDE SERPL-SCNC: 99 MMOL/L (ref 98–107)
CREAT SERPL-MCNC: 0.67 MG/DL (ref 0.51–0.95)
EGFRCR SERPLBLD CKD-EPI 2021: 88 ML/MIN/1.73M2
GLUCOSE SERPL-MCNC: 108 MG/DL (ref 70–99)
HCO3 SERPL-SCNC: 37 MMOL/L (ref 22–29)
MAGNESIUM SERPL-MCNC: 2 MG/DL (ref 1.7–2.3)
MCV RBC AUTO: 96 FL (ref 78–100)
PLATELET # BLD AUTO: 200 10E3/UL (ref 150–450)
POTASSIUM SERPL-SCNC: 3.7 MMOL/L (ref 3.4–5.3)
SODIUM SERPL-SCNC: 140 MMOL/L (ref 135–145)

## 2025-06-27 PROCEDURE — 36415 COLL VENOUS BLD VENIPUNCTURE: CPT | Performed by: STUDENT IN AN ORGANIZED HEALTH CARE EDUCATION/TRAINING PROGRAM

## 2025-06-27 PROCEDURE — 250N000013 HC RX MED GY IP 250 OP 250 PS 637: Performed by: STUDENT IN AN ORGANIZED HEALTH CARE EDUCATION/TRAINING PROGRAM

## 2025-06-27 PROCEDURE — 80048 BASIC METABOLIC PNL TOTAL CA: CPT | Performed by: INTERNAL MEDICINE

## 2025-06-27 PROCEDURE — 250N000013 HC RX MED GY IP 250 OP 250 PS 637: Performed by: CLINICAL NURSE SPECIALIST

## 2025-06-27 PROCEDURE — 85049 AUTOMATED PLATELET COUNT: CPT | Performed by: STUDENT IN AN ORGANIZED HEALTH CARE EDUCATION/TRAINING PROGRAM

## 2025-06-27 PROCEDURE — 120N000001 HC R&B MED SURG/OB

## 2025-06-27 PROCEDURE — 83735 ASSAY OF MAGNESIUM: CPT | Performed by: INTERNAL MEDICINE

## 2025-06-27 PROCEDURE — 250N000011 HC RX IP 250 OP 636: Performed by: STUDENT IN AN ORGANIZED HEALTH CARE EDUCATION/TRAINING PROGRAM

## 2025-06-27 PROCEDURE — 99232 SBSQ HOSP IP/OBS MODERATE 35: CPT | Performed by: INTERNAL MEDICINE

## 2025-06-27 PROCEDURE — 99233 SBSQ HOSP IP/OBS HIGH 50: CPT | Performed by: CLINICAL NURSE SPECIALIST

## 2025-06-27 PROCEDURE — 99418 PROLNG IP/OBS E/M EA 15 MIN: CPT | Performed by: CLINICAL NURSE SPECIALIST

## 2025-06-27 RX ORDER — NALOXONE HYDROCHLORIDE 0.4 MG/ML
0.4 INJECTION, SOLUTION INTRAMUSCULAR; INTRAVENOUS; SUBCUTANEOUS
Status: DISCONTINUED | OUTPATIENT
Start: 2025-06-27 | End: 2025-07-03 | Stop reason: HOSPADM

## 2025-06-27 RX ORDER — ATROPINE SULFATE 10 MG/ML
2 SOLUTION/ DROPS OPHTHALMIC EVERY 4 HOURS PRN
Status: DISCONTINUED | OUTPATIENT
Start: 2025-06-27 | End: 2025-07-03 | Stop reason: HOSPADM

## 2025-06-27 RX ORDER — LORAZEPAM 2 MG/ML
1 CONCENTRATE ORAL EVERY 4 HOURS PRN
Status: DISCONTINUED | OUTPATIENT
Start: 2025-06-27 | End: 2025-07-03 | Stop reason: HOSPADM

## 2025-06-27 RX ORDER — CARBOXYMETHYLCELLULOSE SODIUM 5 MG/ML
1-2 SOLUTION/ DROPS OPHTHALMIC
Status: DISCONTINUED | OUTPATIENT
Start: 2025-06-27 | End: 2025-07-03 | Stop reason: HOSPADM

## 2025-06-27 RX ORDER — GLYCOPYRROLATE 0.2 MG/ML
0.2 INJECTION, SOLUTION INTRAMUSCULAR; INTRAVENOUS EVERY 4 HOURS PRN
Status: DISCONTINUED | OUTPATIENT
Start: 2025-06-27 | End: 2025-07-03 | Stop reason: HOSPADM

## 2025-06-27 RX ORDER — NALOXONE HYDROCHLORIDE 0.4 MG/ML
0.2 INJECTION, SOLUTION INTRAMUSCULAR; INTRAVENOUS; SUBCUTANEOUS
Status: DISCONTINUED | OUTPATIENT
Start: 2025-06-27 | End: 2025-07-03 | Stop reason: HOSPADM

## 2025-06-27 RX ORDER — OXYCODONE HCL 20 MG/ML
2.5 CONCENTRATE, ORAL ORAL
Refills: 0 | Status: DISCONTINUED | OUTPATIENT
Start: 2025-06-27 | End: 2025-07-03 | Stop reason: HOSPADM

## 2025-06-27 RX ORDER — BISACODYL 10 MG
10 SUPPOSITORY, RECTAL RECTAL DAILY PRN
Status: DISCONTINUED | OUTPATIENT
Start: 2025-06-27 | End: 2025-07-03 | Stop reason: HOSPADM

## 2025-06-27 RX ORDER — MINERAL OIL/HYDROPHIL PETROLAT
OINTMENT (GRAM) TOPICAL
Status: DISCONTINUED | OUTPATIENT
Start: 2025-06-27 | End: 2025-07-03 | Stop reason: HOSPADM

## 2025-06-27 RX ADMIN — ENOXAPARIN SODIUM 30 MG: 30 INJECTION SUBCUTANEOUS at 18:06

## 2025-06-27 RX ADMIN — BRIMONIDINE TARTRATE 1 DROP: 2 SOLUTION/ DROPS OPHTHALMIC at 20:51

## 2025-06-27 RX ADMIN — DORZOLAMIDE HYDROCHLORIDE AND TIMOLOL MALEATE 1 DROP: 20; 5 SOLUTION OPHTHALMIC at 09:42

## 2025-06-27 RX ADMIN — BRIMONIDINE TARTRATE 1 DROP: 2 SOLUTION/ DROPS OPHTHALMIC at 09:41

## 2025-06-27 RX ADMIN — DORZOLAMIDE HYDROCHLORIDE AND TIMOLOL MALEATE 1 DROP: 20; 5 SOLUTION OPHTHALMIC at 21:04

## 2025-06-27 RX ADMIN — ASPIRIN 81 MG: 81 TABLET, DELAYED RELEASE ORAL at 09:41

## 2025-06-27 RX ADMIN — LATANOPROST 1 DROP: 50 SOLUTION OPHTHALMIC at 22:05

## 2025-06-27 RX ADMIN — OXYCODONE HYDROCHLORIDE 2.5 MG: 100 SOLUTION ORAL at 14:20

## 2025-06-27 ASSESSMENT — ACTIVITIES OF DAILY LIVING (ADL)
ADLS_ACUITY_SCORE: 47
ADLS_ACUITY_SCORE: 48
ADLS_ACUITY_SCORE: 46
ADLS_ACUITY_SCORE: 48
ADLS_ACUITY_SCORE: 48
ADLS_ACUITY_SCORE: 47
ADLS_ACUITY_SCORE: 46
ADLS_ACUITY_SCORE: 48
ADLS_ACUITY_SCORE: 46
ADLS_ACUITY_SCORE: 47
ADLS_ACUITY_SCORE: 46
ADLS_ACUITY_SCORE: 47
ADLS_ACUITY_SCORE: 47
ADLS_ACUITY_SCORE: 48
ADLS_ACUITY_SCORE: 47
ADLS_ACUITY_SCORE: 48
ADLS_ACUITY_SCORE: 46
ADLS_ACUITY_SCORE: 46

## 2025-06-27 NOTE — PLAN OF CARE
"Alert to self only.on 2L NC on tele SR PRN Robaxin given for back and leg pain.Lisinopril bedtime held MD aware.chest barrell.confused redirected.straight cathed 600ml yellow clear urine      Goal Outcome Evaluation:      Plan of Care Reviewed With: patient    Overall Patient Progress: no changeOverall Patient Progress: no change       Problem: Adult Inpatient Plan of Care  Goal: Plan of Care Review  Description: The Plan of Care Review/Shift note should be completed every shift.  The Outcome Evaluation is a brief statement about your assessment that the patient is improving, declining, or no change.  This information will be displayed automatically on your shift  note.  Outcome: Progressing  Flowsheets (Taken 6/27/2025 0516)  Plan of Care Reviewed With: patient  Overall Patient Progress: no change  Goal: Patient-Specific Goal (Individualized)  Description: You can add care plan individualizations to a care plan. Examples of Individualization might be:  \"Parent requests to be called daily at 9am for status\", \"I have a hard time hearing out of my right ear\", or \"Do not touch me to wake me up as it startles  me\".  Outcome: Progressing  Goal: Absence of Hospital-Acquired Illness or Injury  Outcome: Progressing  Intervention: Identify and Manage Fall Risk  Recent Flowsheet Documentation  Taken 6/26/2025 2056 by Eloise Light RN  Safety Promotion/Fall Prevention:   safety round/check completed   clutter free environment maintained  Intervention: Prevent and Manage VTE (Venous Thromboembolism) Risk  Recent Flowsheet Documentation  Taken 6/26/2025 2056 by Eloise Light RN  VTE Prevention/Management: SCDs on (sequential compression devices)  Goal: Optimal Comfort and Wellbeing  Outcome: Progressing  Intervention: Monitor Pain and Promote Comfort  Recent Flowsheet Documentation  Taken 6/26/2025 2054 by Eloise Light RN  Pain Management Interventions: medication (see MAR)  Goal: Readiness for Transition of " Care  Outcome: Progressing     Problem: Delirium  Goal: Optimal Coping  Outcome: Progressing  Goal: Improved Behavioral Control  Outcome: Progressing  Goal: Improved Attention and Thought Clarity  Outcome: Progressing  Goal: Improved Sleep  Outcome: Progressing     Problem: Heart Failure  Goal: Optimal Coping  Outcome: Progressing  Goal: Optimal Cardiac Output  Outcome: Progressing  Goal: Stable Heart Rate and Rhythm  Outcome: Progressing  Goal: Fluid and Electrolyte Balance  Outcome: Progressing  Goal: Optimal Functional Ability  Outcome: Progressing  Goal: Improved Oral Intake  Outcome: Progressing  Goal: Effective Oxygenation and Ventilation  Outcome: Progressing  Intervention: Promote Airway Secretion Clearance  Recent Flowsheet Documentation  Taken 6/26/2025 2056 by Eloise Light RN  Cough And Deep Breathing: done independently per patient  Goal: Effective Breathing Pattern During Sleep  Outcome: Progressing

## 2025-06-27 NOTE — PLAN OF CARE
"Goal Outcome Evaluation:      Plan of Care Reviewed With: patient    Overall Patient Progress: decliningOverall Patient Progress: declining    Outcome Evaluation: Patient on comfort care, SOB. Sleeping btw care.Had BM.          Problem: Adult Inpatient Plan of Care  Goal: Plan of Care Review  Description: The Plan of Care Review/Shift note should be completed every shift.  The Outcome Evaluation is a brief statement about your assessment that the patient is improving, declining, or no change.  This information will be displayed automatically on your shift  note.  6/27/2025 1355 by Romana Arora RN  Reactivated  6/27/2025 1353 by Romana Arora RN  Outcome: Unable to Meet  Flowsheets (Taken 6/27/2025 1353)  Outcome Evaluation: Patient on comfort care, SOB. Sleeping btw care.Had BM.  Plan of Care Reviewed With: patient  Overall Patient Progress: declining  Goal: Patient-Specific Goal (Individualized)  Description: You can add care plan individualizations to a care plan. Examples of Individualization might be:  \"Parent requests to be called daily at 9am for status\", \"I have a hard time hearing out of my right ear\", or \"Do not touch me to wake me up as it startles  me\".  6/27/2025 1355 by Romana Arora RN  Reactivated  6/27/2025 1353 by Romana Arora RN  Outcome: Unable to Meet  Goal: Absence of Hospital-Acquired Illness or Injury  6/27/2025 1355 by Romana Arora RN  Reactivated  6/27/2025 1353 by Romana Arora RN  Outcome: Unable to Meet  Intervention: Identify and Manage Fall Risk  Recent Flowsheet Documentation  Taken 6/27/2025 0838 by Romana Arora RN  Safety Promotion/Fall Prevention:   activity supervised   toileting scheduled   supervised activity   room organization consistent   nonskid shoes/slippers when out of bed  Intervention: Prevent Skin Injury  Recent Flowsheet Documentation  Taken 6/27/2025 0838 by Romana Arora RN  Body Position: position changed " independently  Intervention: Prevent and Manage VTE (Venous Thromboembolism) Risk  Recent Flowsheet Documentation  Taken 6/27/2025 0838 by Romana Arora RN  VTE Prevention/Management: SCDs on (sequential compression devices)  Intervention: Prevent Infection  Recent Flowsheet Documentation  Taken 6/27/2025 0838 by Romana Arora RN  Infection Prevention:   single patient room provided   rest/sleep promoted  Goal: Optimal Comfort and Wellbeing  6/27/2025 1355 by Romana Arora RN  Reactivated  6/27/2025 1353 by Romana Arora RN  Outcome: Unable to Meet  Goal: Readiness for Transition of Care  6/27/2025 1355 by Romana Aorra RN  Reactivated  6/27/2025 1353 by Romana Arora RN  Outcome: Unable to Meet     Problem: Delirium  Goal: Optimal Coping  Outcome: Unable to Meet  Goal: Improved Behavioral Control  Outcome: Unable to Meet  Goal: Improved Attention and Thought Clarity  Outcome: Unable to Meet  Goal: Improved Sleep  Outcome: Unable to Meet     Problem: Heart Failure  Goal: Optimal Coping  Outcome: Unable to Meet  Goal: Optimal Cardiac Output  Outcome: Unable to Meet  Goal: Stable Heart Rate and Rhythm  Outcome: Unable to Meet  Goal: Fluid and Electrolyte Balance  Outcome: Unable to Meet  Goal: Optimal Functional Ability  Outcome: Unable to Meet  Goal: Improved Oral Intake  Outcome: Unable to Meet  Goal: Effective Oxygenation and Ventilation  Outcome: Unable to Meet  Goal: Effective Breathing Pattern During Sleep  Outcome: Unable to Meet  Intervention: Monitor and Manage Obstructive Sleep Apnea  Recent Flowsheet Documentation  Taken 6/27/2025 0838 by Romana Arora RN  Medication Review/Management:   medications reviewed   high-risk medications identified

## 2025-06-27 NOTE — PROGRESS NOTES
PALLIATIVE CARE PROGRESS NOTE  [facility]     Patient Name: Berenice Krause  Date of Admission: 6/24/2025   Today the patient was seen for: Goals of care - family care conference     Recommendations & Counseling     GOALS OF CARE:   Comfort-focused treatment - Family is hopeful she will return to [facility] with support of hospice as the facility is only 4 minutes away from their home.   Family aware of the risk of aspiration and agree with plan for comfort-focused eating.      ADVANCE CARE PLANNING:  Advance Directive dated 5/21/2008 has been sent to [employer] to be verified.  The document does not name a Health Care agent.   New POLST form dated 6/27/2025 can be found in ACP docs.   Code status: No CPR- Do NOT Intubate     MEDICAL MANAGEMENT:   Comfort Care   Below are common symptoms routinely seen in patients with comfort focused goals and at the end of life. This patient may not currently exhibit all of these symptoms; however, if these were to occur our recommendations are as follows:    #Pain and #Air hunger/Dyspnea   Given CRF (Creatine Clearance 37.2 mL/min) would avoid the use of Morphine  Roxicodone PRN for pain, dyspnea or respiratory rate greater than 20.    #Anxiety    Lorazepam PO/SL 1 mg  q 3 hour as needed.     #Secretion burden   Robinul 0.2 mg (PO/IV) q 4 hours as needed. If ineffective, increase up to 0.3 mg   Consider atropine if Robinul ineffective after dose increase      #Nausea   Zofran 4 mg q 6 hours as needed. Can increase to 8 mg   Zyprexa 5 mg q 6 hours as needed     #Agitation  Aggressive control of other symptoms first, then  1st choice: Zyprexa 5 mg ODT or IM   2nd choice: Increase dose of Zyprexa to 10 mg or consider switch to Haldol   3rd choice: Lorazepam as above      PSYCHOSOCIAL/SPIRITUAL SUPPORT:  Family : Brother Giorgio, sister in law Anna and their daughter Zandra.  Berenice is not  and did not have children.  Has a sister with whom she  "is estranged.    Berenice lived next door to Giorgio and Anna prior to her move to [facility] 7 years ago.  Giorgio states she sold her house to Zandra Field. States they are very close and they look after her and assist her with finances.   Susanne community: [facility] consulted for support.       Palliative Care will continue to follow. Thank you for the consult and allowing us to aid in the care of Berenice Krause.    These recommendations have been discussed with Hospitalist Juan Solis MD and bedside nurse YARELY Avendano.    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  She presented on 6/24 with shortness of breath and BLE edema, thought to be due to HF exacerbation.  Admitted for further work up and medical management.     Patient on diuretics at baseline, labs indicated elevated Cr question diuretic intolerance.  Cardiology consulted.  Per Hospitalist conversation, patient has not shown interest in further interventions.  Patient has become more confused throughout hospitalization with significant change on 6/26.     Today, the patient was seen for:  Introduction to palliative care  Goals of care  Patient and family support      Interval History:     Multidisciplinary collaboration:  Appreciate input of the bedside nursing staff, as Berenice is clearly having issues with swallowing.   Patient/family narrative  Met with the patient's brother Giorgio and sister-in-law JamilDavid at bedside. Giorgio acknowledged his understanding \"She's been dwindling the past couple of months. It's been a rapid slide. We weren't quite ready for this.\" He " "explained Berenice has been residing at [facility] for the past 7 years. 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 \"It's really nice having the grand kids next door.\" He explained the three of them are very close. It's only a 4 minute drive to visit her at [facility] and 6 minutes to drive to the hospital.      Review of Systems:     Besides above, ROS was reviewed and is unremarkable        Physical Exam:   Temp:  [98.1  F (36.7  C)] 98.1  F (36.7  C)  Pulse:  [91-92] 91  Resp:  [17-18] 18  BP: (120-132)/(60-65) 132/65  SpO2:  [93 %-95 %] 95 %  77 lbs 8 oz    Physical Exam  GEN:  Cachetic and frail elderly female. Alert, oriented to self only, appears comfortable, no apparent distress.  HEENT:  Normocephalic/atraumatic, no scleral icterus, no nasal discharge, mouth moist.  CV:  RRR, S1, S2; + murmurs..  +3 DP/PT pulses bilatererally; no edema BLE.  RESP:  Severely kyphotic thoracic spine. Clear to auscultation bilaterally without rales/rhonchi/wheezing/retractions.  Symmetric chest rise on inhalation noted.  Normal respiratory effort on oxygen via nasal cannula.  ABD:  Rounded, soft, non-tender/non-distended.  +BS    M/S:   Denies pain with palpation of extremities.    SKIN:  Warm and dry to touch, bruised right foot.   PAIN BEHAVIOR: Cooperative  Psych:  Flat affect.  Calm, cooperative, confused conversation.       Data Reviewed:     Recent Results (from the past 24 hours)   CT Chest w/o Contrast    Narrative    EXAM: CT CHEST W/O CONTRAST  LOCATION: [facility]  DATE: 6/26/2025    INDICATION: Patient with severe kyphoscoliosis here with hypoxia. unclear whether CHF, advanced COPD or aspiration problems.  COMPARISON: Chest radiograph 6/24/2025.  TECHNIQUE: CT chest without IV contrast. Multiplanar reformats were obtained. Dose reduction techniques were used.  CONTRAST: None.    FINDINGS:   LUNGS AND PLEURA: Severe upper lobe " predominant panlobular emphysema. There is some trace interlobular septal thickening in the lung bases with small bilateral pleural effusions and associated compressive atelectasis, left greater than right. No lobar   airspace consolidation or pneumothorax. 4 mm noncalcified right lower lobe pulmonary nodule (series 4 image 115). Large calcified granuloma in the left upper lobe.    MEDIASTINUM/AXILLAE: Prior median sternotomy. No suspicious lymphadenopathy. No pericardial effusion.    CORONARY ARTERY CALCIFICATION: Severe.    UPPER ABDOMEN: Unremarkable.    MUSCULOSKELETAL: Severe, exaggerated thoracic kyphosis with multiple mid and lower thoracic vertebral body compression deformities, not significantly changed from most recent chest radiograph. No definite acute bony abnormality.      Impression    IMPRESSION:   1.  Severe emphysema with likely mild superimposed pulmonary edema and small bilateral pleural effusions.  2.  Incidental 4 mm noncalcified right lower lobe pulmonary nodule, consider follow-up described below.    REFERENCE:  Guidelines for Management of Incidental Pulmonary Nodules Detected on CT Images: From the Fleischner Society 2017.   Guidelines apply to incidental nodules in patients who are 35 years or older.  Guidelines do not apply to lung cancer screening, patients with immunosuppression, or patients with known primary cancer.    SINGLE NODULE  Nodule size <6 mm  Low-risk patients: No follow-up needed.  High-risk patients: Optional follow-up CT at 12 months.         Platelet count   Result Value Ref Range    Platelet Count 200 150 - 450 10e3/uL    MCV 96 78 - 100 fL   Magnesium   Result Value Ref Range    Magnesium 2.0 1.7 - 2.3 mg/dL   Basic metabolic panel   Result Value Ref Range    Sodium 140 135 - 145 mmol/L    Potassium 3.7 3.4 - 5.3 mmol/L    Chloride 99 98 - 107 mmol/L    Carbon Dioxide (CO2) 37 (H) 22 - 29 mmol/L    Anion Gap 4 (L) 7 - 15 mmol/L    Urea Nitrogen 32.3 (H) 8.0 - 23.0  mg/dL    Creatinine 0.67 0.51 - 0.95 mg/dL    GFR Estimate 88 >60 mL/min/1.73m2    Calcium 8.0 (L) 8.8 - 10.4 mg/dL    Glucose 108 (H) 70 - 99 mg/dL       MANAGEMENT DISCUSSED with the following over the past 24 hours: Hospitalist Juan Solis MD; bedside nurse YARELY Avendano and  YUNIER Sosa.    90 MINUTES SPENT BY ME on the date of service doing chart review, history, exam, documentation & further activities per the note.

## 2025-06-27 NOTE — CONSULTS
"CLINICAL NUTRITION SERVICES    Chart reviewed for Provider Order - \"need to document the degree of malnutrition\".   Informed decision made to change pt s status to comfort care. Completing consult. No nutrition interventions planned at this time. RD can be consulted if needed.    RD signing off on 6/27/2025.    "

## 2025-06-27 NOTE — PROGRESS NOTES
Care Management Follow Up    Length of Stay (days): 3    Expected Discharge Date: 06/28/2025     Concerns to be Addressed: discharge planning     Patient plan of care discussed at interdisciplinary rounds: Yes    Anticipated Discharge Disposition:                Anticipated Discharge Services:    Anticipated Discharge DME:      Patient/family educated on Medicare website which has current facility and service quality ratings:    Education Provided on the Discharge Plan:    Patient/Family in Agreement with the Plan: yes    Referrals Placed by CM/SW:    Private pay costs discussed: Not applicable    Discussed  Partnership in Safe Discharge Planning  document with patient/family: No     Handoff Completed: No, handoff not indicated or clinically appropriate    Additional Information:  [facility] met with pt's brother and sister in law regarding discharge plans. Pt is now comfort cares and the discharge plan is to a care suite with hospice.     [facility] called the [facility] to inquire about pt's ability to return. Facility [facility], stated that they currently do not have a care suite available and that is what would be needed to provide the level of care the pt requires.     SW provided this update to pt's family. Pt's niece is now looking into new facilities that have care suites available. SW to support with this process as needed.     SW provided further information of hospice and what the benefit provides. Hospice agency to be decided upon following a set facility for time of discharge.     Next Steps: SW to follow for discharge needs.    DARIO Patel, LGSW  [facility] Care Coordinator/ Med Surg [address] [place] [address]  [phone]

## 2025-06-27 NOTE — CONSULTS
"SPIRITUAL HEALTH SERVICES - Consult Note  MS 3    Referral Source/ Reason for Visit: Staff consult for emotional support.    Summary and Recommendations -     Sat with patient; who states that she is in terrible pain and cannot get comfortable.    Staff was called to reposition her.    She then began to engage in life review.  Patient named some highlights of her life; including her marriage to her her ; her love of dogs, her opportunities to travel - especially cruises to the [facility] and [place]; and the blessing of having a brother.    Patient shared that she has no unfinished business and is not afraid of death. \"I'm Mu-ism, but I'm not sure what happens after death. I'm not worried about it.\"    \"I AM afraid of being in pain,\" she concluded.  If I'm going to die, I want to be comfortable.    Patient asked if my colleagues or I could come back if she needed to talk again. I responded that we are available by asking her nurse to contact us.      Plan: No additional needs at this time.  [facility] remains available upon request.    Rev. KAYLIN Thacker.  Staff     SHS available 24/7 for emergent requests/ referrals, either by paging the on-call  or by entering an ASAP/STAT consult in [employer], which will also page the on-call .      Assessment    Saw pt Berenice Krause, her brother and his wife regarding staff consult for emotional support.    Patient/Family Understanding of Illness and Goals of Care - Patient understands that she is in the hospital and that she is very sick.  She lives with progressive dementia and does not understand her health status.  She believes she has cancer.  Medical record lists serious cardiac concerns, COPD, hypertension, hyperlipidemia, visual impairment and other hospital problems.      Distress and Loss - She named the death of her  long ago as a forever loss.    Strengths, Coping and Resources - She listed her brother, Giorgio and his wife, Anna " as sources of support.     Meaning, Beliefs and Spirituality - Patient says she is Holiness.  She has no home Yazidism at this time.

## 2025-06-27 NOTE — PROGRESS NOTES
[facility]    Medicine Progress Note - Hospitalist Service    Date of Admission:  6/24/2025    Assessment & Plan   Berenice Krause is a 80 year old female admitted on 6/24/2025 lower extremity swelling and SOB due thought due to exacerbation of HFmrEF. He was also found to have an apparent large hematoma over the dorsum of the right foot.     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     In the emergency department the patient was afebrile. Her heart rate was . Blood pressures 117-154/. She was requiring 3L via nasal cannula to maintain oxygen saturations >92%.    Laboratory studies were notable for bicarb 31, BUN 30.7, creatinine 0.67, glucose 188, NT proBNP 13,371, troponin less than 6, WBC 12.2.    ECG: sinus tachycardia, but without ST segment dynamic changes.  She did have some PACs as well as T wave inversions in leads III, V1, and V2; morphology is similar to prior ECG obtained 11/2024.    Chest x-ray: interstitial prominence and hazy interstitial opacities.   ED interventions: Lasix 40 mg IV and was admitted for further cares of suspected heart failure exacerbation.     Ms. Krause was admitted to a medical bed on telemetry after having received a dose of Furosemide 40 mg IV in the ED. She got a second dose before it was noted that her creatinine was rising and her BP was falling. With these findings, Lisinopril was reduced to 2.5 mg daily with parameters to hold and the lasix was stopped.     Her Echo is not significantly different than the one completed in 11/2024, but note that she has both reduced EF and multiple moderate valvular insufficiencies.     Most notably, though, the pt is quite limited in function. She walks several feet, but with a walker and assist. Family has recognized that she has developed significant memory  "deficits. She is unable to answer many questions about her preferences for longer term goals of care. She appears to make sense, but mostly seems to avoid answering. Her brother and SINAN are close and involved family members.  They are willing to be decision makers for her, but they are not \"designated\" POA.     DX:  SOB and peripheral edema in the setting of known HFmrEF, moderate AORTIC STENOSIS and COPD (not oxygen-dependent). NT-BNP 13K and TTE from 11/2024 40-45%. Echo obtained hospital stay unchanged from previous. Mild hypoxia may be related to CHF v poss infiltrates.  Right foot hematoma. This is superficial and does NOT look acute. It looks to have significant edema involved in the flaccid fullness. NO PULSE IS FOUND WITH DOPPLER, BUT PT DENIES PAIN AND IS ABLE TO MOVE HER FOOT WITHOUT PROBLEMS.  HARJIT due to intolerance to diuresis. This bodes poorly for CHF management. Baseline creat suggests slightly better than actual GFR based on GFR est on Cystatin C.  Mild hyperglycemia in the setting of pre-diabetes NIDDM. HbA1c 6.0.   Leukocytosis with left shift.  Resolved. CRP 11 and Procalcitonin 0.12 - not consistent with significant infection.   HTN.  Currently relatively hypotensive.  Central retinal artery occlusion, left eye.   Cognitive impairment is suggested in Dr. Ennis' admission note, and although she appears to be able to seem appropriate, she evades questions that are difficult and often is frankly confused. 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   Severe malnutrition, suspect that this is related to undiagnosed aspiration. I suspect this is due to chronic aspiration.  She seems to cough with every swallow and has a wet cough with eating. She has known diffuse changes on MRI of the Brain \"typically associated with chronic hypertension or amyloid angiopathy\".   Goals of care. Discussed with family that the pt is likely at least, to need to return to the hospital relatively frequently due to her many combined " issues.   PLAN:  Cont to hold diuretics and hold reduced dose of Lisinopril due to worsening HARJIT.  Other usual home meds are resumed.   Briefly discussed with the pt the cardiologist's assessment. At this time, the pt is not interested in considering hospice, but avoiding interventions is confirmed. Palliative care consultation to help family with planning.  CT Chest without contrast. Need to establish whether she is aspirating, has COPD or whether this is all CHF.           Diet: Fluid restriction 1800 ML FLUID  Regular Diet Adult    DVT Prophylaxis: Enoxaparin (Lovenox) SQ  Herbert Catheter: Not present  Lines: None     Cardiac Monitoring: None  Code Status: No CPR- Do NOT Intubate      Clinically Significant Risk Factors          # Hypochloremia: Lowest Cl = 97 mmol/L in last 2 days, will monitor as appropriate      # Hypoalbuminemia: Lowest albumin = 3.1 g/dL at 6/25/2025  7:21 AM, will monitor as appropriate     # Hypertension: Noted on problem list    # Heart failure, NOS: heart failure noted on the problem list and last echo with EF 40-50%               # Financial/Environmental Concerns:           Social Drivers of Health    Tobacco Use: Medium Risk (11/19/2024)    Received from [facility]    Patient History     Smoking Tobacco Use: Former     Smokeless Tobacco Use: Never          Disposition Plan     Medically Ready for Discharge: Anticipated in 2-4 Days      Juan Solis MD  Hospitalist Service  [facility]  Securely message with [employer] (more info)  Text page via [facility] Paging/Directory   ______________________________________________________________________    Interval History   Less interactive today.  More sleepy.  She evidently ate quite well earlier in the day.    I discussed with the patient's brother and sister-in-law that she would be appropriate for hospice if they feel that that is consistent with her previously stated wishes.  We asked for  palliative care to see the patient and family. Hospice discussion and tentative plans related to that today.     Physical Exam   Vital Signs: Temp: 98.5  F (36.9  C) Temp src: Oral BP: 119/56 Pulse: 86   Resp: 18 SpO2: 93 % O2 Device: Nasal cannula Oxygen Delivery: 3 LPM  Weight: 77 lbs 8 oz    General Appearance: Very thin, frail, elderly woman in NAD. Calm in bed on O2 at 2 LPM by NC.  Severe kyphosis noted. Marked decrease in muscular bulk and subcutaneous fat. Less interactive today than yesterday.   Respiratory: No increased WOB. Decreased air entry at the left posterior. No obvious rales or wheeze.   Cardiovascular: RRR with soft systolic Aortic as well as Tricuspid area murmurs.   GI: soft, NTND  Skin: the right foot is bruised-appearing with purple discoloration and flaccid edema over the dorsum of the foot. I am unable to find a pulse even with Doppler device.   Trace edema otherwise. Decreased muscular bulk noted over the upper mmore so than the lower extremities.   Other:  Denies areas of pain.     Medical Decision Making       45 MINUTES SPENT BY ME on the date of service doing chart review, history, exam, documentation & further activities per the note.      Data   Recent Results (from the past 24 hours)   Platelet count   Result Value Ref Range    Platelet Count 200 150 - 450 10e3/uL    MCV 96 78 - 100 fL   Magnesium   Result Value Ref Range    Magnesium 2.0 1.7 - 2.3 mg/dL   Basic metabolic panel   Result Value Ref Range    Sodium 140 135 - 145 mmol/L    Potassium 3.7 3.4 - 5.3 mmol/L    Chloride 99 98 - 107 mmol/L    Carbon Dioxide (CO2) 37 (H) 22 - 29 mmol/L    Anion Gap 4 (L) 7 - 15 mmol/L    Urea Nitrogen 32.3 (H) 8.0 - 23.0 mg/dL    Creatinine 0.67 0.51 - 0.95 mg/dL    GFR Estimate 88 >60 mL/min/1.73m2    Calcium 8.0 (L) 8.8 - 10.4 mg/dL    Glucose 108 (H) 70 - 99 mg/dL

## 2025-06-27 NOTE — PROGRESS NOTES
Crosscover paged at 2000 pt was in pain visible and states her back and legs hurt MD emily rodriguez aware and prescribed a onetime med of  methocarbamol 750mg.

## 2025-06-28 PROCEDURE — 250N000013 HC RX MED GY IP 250 OP 250 PS 637: Performed by: STUDENT IN AN ORGANIZED HEALTH CARE EDUCATION/TRAINING PROGRAM

## 2025-06-28 PROCEDURE — 250N000013 HC RX MED GY IP 250 OP 250 PS 637: Performed by: INTERNAL MEDICINE

## 2025-06-28 PROCEDURE — 999N000157 HC STATISTIC RCP TIME EA 10 MIN

## 2025-06-28 PROCEDURE — 999N000156 HC STATISTIC RCP CONSULT EA 30 MIN

## 2025-06-28 PROCEDURE — 250N000011 HC RX IP 250 OP 636: Performed by: STUDENT IN AN ORGANIZED HEALTH CARE EDUCATION/TRAINING PROGRAM

## 2025-06-28 PROCEDURE — 94640 AIRWAY INHALATION TREATMENT: CPT

## 2025-06-28 PROCEDURE — 120N000001 HC R&B MED SURG/OB

## 2025-06-28 PROCEDURE — 99232 SBSQ HOSP IP/OBS MODERATE 35: CPT | Performed by: INTERNAL MEDICINE

## 2025-06-28 PROCEDURE — 250N000013 HC RX MED GY IP 250 OP 250 PS 637: Performed by: CLINICAL NURSE SPECIALIST

## 2025-06-28 PROCEDURE — 999N000147 HC STATISTIC PT IP EVAL DEFER

## 2025-06-28 PROCEDURE — 250N000009 HC RX 250: Performed by: INTERNAL MEDICINE

## 2025-06-28 RX ORDER — ALBUTEROL SULFATE 0.83 MG/ML
2.5 SOLUTION RESPIRATORY (INHALATION)
Status: DISCONTINUED | OUTPATIENT
Start: 2025-06-28 | End: 2025-07-03 | Stop reason: HOSPADM

## 2025-06-28 RX ORDER — IPRATROPIUM BROMIDE AND ALBUTEROL SULFATE 2.5; .5 MG/3ML; MG/3ML
3 SOLUTION RESPIRATORY (INHALATION)
Status: DISCONTINUED | OUTPATIENT
Start: 2025-06-28 | End: 2025-07-01

## 2025-06-28 RX ADMIN — BRIMONIDINE TARTRATE 1 DROP: 2 SOLUTION/ DROPS OPHTHALMIC at 09:52

## 2025-06-28 RX ADMIN — OXYCODONE HYDROCHLORIDE 2.5 MG: 100 SOLUTION ORAL at 03:40

## 2025-06-28 RX ADMIN — DORZOLAMIDE HYDROCHLORIDE AND TIMOLOL MALEATE 1 DROP: 20; 5 SOLUTION OPHTHALMIC at 09:51

## 2025-06-28 RX ADMIN — IPRATROPIUM BROMIDE AND ALBUTEROL SULFATE 3 ML: .5; 3 SOLUTION RESPIRATORY (INHALATION) at 20:05

## 2025-06-28 RX ADMIN — ACETAMINOPHEN 1000 MG: 500 TABLET, FILM COATED ORAL at 13:29

## 2025-06-28 RX ADMIN — ASPIRIN 81 MG: 81 TABLET, DELAYED RELEASE ORAL at 09:51

## 2025-06-28 RX ADMIN — ENOXAPARIN SODIUM 30 MG: 30 INJECTION SUBCUTANEOUS at 17:46

## 2025-06-28 RX ADMIN — FLUTICASONE FUROATE AND VILANTEROL TRIFENATATE 1 PUFF: 100; 25 POWDER RESPIRATORY (INHALATION) at 09:52

## 2025-06-28 ASSESSMENT — ACTIVITIES OF DAILY LIVING (ADL)
ADLS_ACUITY_SCORE: 46
ADLS_ACUITY_SCORE: 44
ADLS_ACUITY_SCORE: 44
ADLS_ACUITY_SCORE: 46
ADLS_ACUITY_SCORE: 44
ADLS_ACUITY_SCORE: 46
ADLS_ACUITY_SCORE: 44
ADLS_ACUITY_SCORE: 46
ADLS_ACUITY_SCORE: 44
ADLS_ACUITY_SCORE: 46
ADLS_ACUITY_SCORE: 46
ADLS_ACUITY_SCORE: 44
ADLS_ACUITY_SCORE: 46
ADLS_ACUITY_SCORE: 46
ADLS_ACUITY_SCORE: 44
ADLS_ACUITY_SCORE: 46
ADLS_ACUITY_SCORE: 44

## 2025-06-28 NOTE — PROGRESS NOTES
[facility]    Medicine Progress Note - Hospitalist Service    Date of Admission:  6/24/2025    Assessment & Plan   Berenice Krause is a 80 year old female admitted on 6/24/2025 lower extremity swelling and SOB due thought due to exacerbation of HFmrEF. He was also found to have an apparent large hematoma over the dorsum of the right foot.     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     In the emergency department the patient was afebrile. Her heart rate was . Blood pressures 117-154/. She was requiring 3L via nasal cannula to maintain oxygen saturations >92%.    Laboratory studies were notable for bicarb 31, BUN 30.7, creatinine 0.67, glucose 188, NT proBNP 13,371, troponin less than 6, WBC 12.2.    ECG: sinus tachycardia, but without ST segment dynamic changes.  She did have some PACs as well as T wave inversions in leads III, V1, and V2; morphology is similar to prior ECG obtained 11/2024.    Chest x-ray: interstitial prominence and hazy interstitial opacities.   ED interventions: Lasix 40 mg IV and was admitted for further cares of suspected heart failure exacerbation.     Ms. Krause was admitted to a medical bed on telemetry after having received a dose of Furosemide 40 mg IV in the ED. She got a second dose before it was noted that her creatinine was rising and her BP was falling. With these findings, Lisinopril was reduced to 2.5 mg daily with parameters to hold and the lasix was stopped.     Her Echo is not significantly different than the one completed in 11/2024, but note that she has both reduced EF and multiple moderate valvular insufficiencies.     Most notably, though, the pt is quite limited in function. She walks several feet, but with a walker and assist. Family has recognized that she has developed significant memory  "deficits. She is unable to answer many questions about her preferences for longer term goals of care. She appears to make sense, but mostly seems to avoid answering. Her brother and SINAN are close and involved family members.  They are willing to be decision makers for her, but they are not \"designated\" POA.     DX:  Hypoxic resp failure and peripheral edema in the setting of known HFmrEF, moderate AORTIC STENOSIS and Severe Emphysema (not previously oxygen-dependent). NT-BNP 13K and TTE from 11/2024 40-45%. Echo obtained hospital stay unchanged from previous. Hypoxia may be related to CHF v poss infiltrates.  Right foot hematoma. This is superficial and does NOT look acute. It looks to have significant edema involved in the flaccid fullness. NO PULSE IS FOUND WITH DOPPLER, BUT PT DENIES PAIN AND IS ABLE TO MOVE HER FOOT WITHOUT PROBLEMS.  HARJIT due to intolerance to diuresis. This bodes poorly for CHF management. Baseline creat suggests slightly better than actual GFR based on GFR est on Cystatin C.  Mild hyperglycemia in the setting of pre-diabetes NIDDM. HbA1c 6.0.   Leukocytosis with left shift.  Resolved. CRP 11 and Procalcitonin 0.12 - not consistent with significant infection.   HTN.  Currently relatively hypotensive.  Central retinal artery occlusion, left eye.   Cognitive impairment is suggested in Dr. Ennis' admission note, and although she appears to be able to seem appropriate, she evades questions that are difficult and often is frankly confused. 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   Severe malnutrition, suspect that this is related to undiagnosed aspiration.  She seems to cough with every swallow and has a wet cough with eating.   Dementia. She has known diffuse changes on MRI of the Brain \"typically associated with chronic hypertension or amyloid angiopathy\".   Goals of care. Discussed with family that the pt is likely at least, to need to return to the hospital relatively frequently due to her many combined " issues.   PLAN:  Cont to hold diuretics and hold reduced dose of Lisinopril due to worsening HARJIT.  Other usual home meds are resumed.   Briefly discussed with the pt the cardiologist's assessment. At this time, the pt is not interested in considering hospice, but avoiding interventions is confirmed. Palliative care consultation to help family with planning.  Add steroid and beta-agonist to treatment.           Diet: Fluid restriction 1800 ML FLUID  Regular Diet Adult    DVT Prophylaxis: Enoxaparin (Lovenox) SQ  Hebrert Catheter: Not present  Lines: None     Cardiac Monitoring: None  Code Status: No CPR- Do NOT Intubate      Clinically Significant Risk Factors               # Hypoalbuminemia: Lowest albumin = 3.1 g/dL at 6/25/2025  7:21 AM, will monitor as appropriate     # Hypertension: Noted on problem list    # Heart failure, NOS: heart failure noted on the problem list and last echo with EF 40-50%               # Financial/Environmental Concerns:           Social Drivers of Health    Tobacco Use: Medium Risk (11/19/2024)    [email or web address]    Patient History     Smoking Tobacco Use: Former     Smokeless Tobacco Use: Never          Disposition Plan     Medically Ready for Discharge: Anticipated in 2-4 Days      Juan Solis MD  Hospitalist Service  [facility]  Securely message with [employer] (more info)  Text page via [employer] Paging/Directory   ______________________________________________________________________    Interval History   Stable night.     Pt not completely clear about how she feels. Denies pain. Eating well per nurse.    Physical Exam   Vital Signs: Temp: 98.5  F (36.9  C) Temp src: Oral BP: 101/54 Pulse: 83   Resp: 20 SpO2: 92 % O2 Device: Nasal cannula Oxygen Delivery: 3 LPM  Weight: 77 lbs 8 oz    General Appearance: Very thin, frail, elderly woman in NAD. Calm in bed on O2 at 2 LPM by NC.  Severe kyphosis noted. Marked decrease in  muscular bulk and subcutaneous fat. Less interactive today than yesterday.   Respiratory: No increased WOB. Decreased air entry at the left posterior. No obvious rales or wheeze.  Severe kyphoscoliosis.  Cardiovascular: RRR with soft systolic Aortic as well as Tricuspid area murmurs.   GI: soft, NTND  Skin: the right foot is bruised-appearing with purple discoloration and flaccid edema over the dorsum of the foot. I am unable to find a pulse even with Doppler device.   Trace edema otherwise. Decreased muscular bulk noted over the upper mmore so than the lower extremities.   Other:  Denies areas of pain.     Medical Decision Making       45 MINUTES SPENT BY ME on the date of service doing chart review, history, exam, documentation & further activities per the note.      Data   Results for orders placed or performed during the hospital encounter of 06/24/25   Chest XR,  PA & LAT     Status: None    Narrative    EXAM: XR CHEST 2 VIEWS  LOCATION: [facility]  DATE: 6/24/2025    INDICATION: Dyspnea, BLE swelling.  COMPARISON: 1/23/2018.      Impression    IMPRESSION:   Cardiomegaly. Severe thoracic kyphosis limits assessment. Mild streaky opacity suggested at the lower lungs that could be mild airspace disease versus atelectasis. Bilateral interstitial prominence and hazy opacities suggestive of mild edema.   XR Foot Port Right 3 Views     Status: None    Narrative    EXAM: XR FOOT PORT RIGHT 3 VIEWS  LOCATION: [facility]  DATE: 6/25/2025    INDICATION: large hematoma; r o underlying fracture  COMPARISON: None.      Impression    IMPRESSION: 3 views right foot. Marked soft tissue swelling about the dorsum of the metatarsals and tarsal metatarsal junction. No acute fractures seen. Right foot otherwise unremarkable. If continued clinical concern, additional cross-sectional imaging   should be considered.   CT Chest w/o Contrast     Status: None    Narrative    EXAM: CT CHEST W/O  CONTRAST  LOCATION: [facility]  DATE: 6/26/2025    INDICATION: Patient with severe kyphoscoliosis here with hypoxia. unclear whether CHF, advanced COPD or aspiration problems.  COMPARISON: Chest radiograph 6/24/2025.  TECHNIQUE: CT chest without IV contrast. Multiplanar reformats were obtained. Dose reduction techniques were used.  CONTRAST: None.    FINDINGS:   LUNGS AND PLEURA: Severe upper lobe predominant panlobular emphysema. There is some trace interlobular septal thickening in the lung bases with small bilateral pleural effusions and associated compressive atelectasis, left greater than right. No lobar   airspace consolidation or pneumothorax. 4 mm noncalcified right lower lobe pulmonary nodule (series 4 image 115). Large calcified granuloma in the left upper lobe.    MEDIASTINUM/AXILLAE: Prior median sternotomy. No suspicious lymphadenopathy. No pericardial effusion.    CORONARY ARTERY CALCIFICATION: Severe.    UPPER ABDOMEN: Unremarkable.    MUSCULOSKELETAL: Severe, exaggerated thoracic kyphosis with multiple mid and lower thoracic vertebral body compression deformities, not significantly changed from most recent chest radiograph. No definite acute bony abnormality.      Impression    IMPRESSION:   1.  Severe emphysema with likely mild superimposed pulmonary edema and small bilateral pleural effusions.  2.  Incidental 4 mm noncalcified right lower lobe pulmonary nodule, consider follow-up described below.    REFERENCE:  Guidelines for Management of Incidental Pulmonary Nodules Detected on CT Images: From the Fleischner Society 2017.   Guidelines apply to incidental nodules in patients who are 35 years or older.  Guidelines do not apply to lung cancer screening, patients with immunosuppression, or patients with known primary cancer.    SINGLE NODULE  Nodule size <6 mm  Low-risk patients: No follow-up needed.  High-risk patients: Optional follow-up CT at 12 months.         Kimber Thomas      Status: None    Narrative    The following orders were created for panel order [place] Draw.  Procedure                               Abnormality         Status                     ---------                               -----------         ------                     Extra Blue Top Tube[[id number]]                             Final result               Extra Red Top Tube[[id number]]                              Final result               Extra Green Top (Lithiu...[[id number]]                      Final result               Extra Purple Top Tube[[id number]]                           Final result                 Please view results for these tests on the individual orders.   Extra Blue Top Tube     Status: None   Result Value Ref Range    Hold Specimen JIC    Extra Red Top Tube     Status: None   Result Value Ref Range    Hold Specimen JIC    Extra Green Top (Lithium Heparin) Tube     Status: None   Result Value Ref Range    Hold Specimen JIC    Extra Purple Top Tube     Status: None   Result Value Ref Range    Hold Specimen JIC    Comprehensive metabolic panel     Status: Abnormal   Result Value Ref Range    Sodium 142 135 - 145 mmol/L    Potassium 3.8 3.4 - 5.3 mmol/L    Carbon Dioxide (CO2) 31 (H) 22 - 29 mmol/L    Anion Gap 8 7 - 15 mmol/L    Urea Nitrogen 30.7 (H) 8.0 - 23.0 mg/dL    Creatinine 0.67 0.51 - 0.95 mg/dL    GFR Estimate 88 >60 mL/min/1.73m2    Calcium 8.6 (L) 8.8 - 10.4 mg/dL    Chloride 103 98 - 107 mmol/L    Glucose 188 (H) 70 - 99 mg/dL    Alkaline Phosphatase 93 40 - 150 U/L    AST 25 0 - 45 U/L    ALT 30 0 - 50 U/L    Protein Total 6.7 6.4 - 8.3 g/dL    Albumin 3.6 3.5 - 5.2 g/dL    Bilirubin Total 0.9 <=1.2 mg/dL   NT-proBNP     Status: Abnormal   Result Value Ref Range    NT-proBNP 13,371 (H) 0 - 624 pg/mL   Troponin T, High Sensitivity     Status: Normal   Result Value Ref Range    Troponin T, High Sensitivity <6 <=14 ng/L   CBC with platelets and differential     Status: Abnormal   Result  Value Ref Range    WBC Count 12.2 (H) 4.0 - 11.0 10e3/uL    RBC Count 4.89 3.80 - 5.20 10e6/uL    Hemoglobin 14.9 11.7 - 15.7 g/dL    Hematocrit 46.5 35.0 - 47.0 %    MCV 95 78 - 100 fL    MCH 30.5 26.5 - 33.0 pg    MCHC 32.0 31.5 - 36.5 g/dL    RDW 15.2 (H) 10.0 - 15.0 %    Platelet Count 279 150 - 450 10e3/uL    % Neutrophils 77 %    % Lymphocytes 14 %    % Monocytes 8 %    % Eosinophils 1 %    % Basophils 1 %    % Immature Granulocytes 1 %    NRBCs per 100 WBC 0 <1 /100    Absolute Neutrophils 9.4 (H) 1.6 - 8.3 10e3/uL    Absolute Lymphocytes 1.8 0.8 - 5.3 10e3/uL    Absolute Monocytes 0.9 0.0 - 1.3 10e3/uL    Absolute Eosinophils 0.1 0.0 - 0.7 10e3/uL    Absolute Basophils 0.1 0.0 - 0.2 10e3/uL    Absolute Immature Granulocytes 0.1 <=0.4 10e3/uL    Absolute NRBCs 0.0 10e3/uL   Hemoglobin A1c     Status: Abnormal   Result Value Ref Range    Estimated Average Glucose 126 (H) <117 mg/dL    Hemoglobin A1C 6.0 (H) <5.7 %   Creatinine     Status: Normal   Result Value Ref Range    Creatinine 0.76 0.51 - 0.95 mg/dL    GFR Estimate 79 >60 mL/min/1.73m2   Magnesium     Status: Normal   Result Value Ref Range    Magnesium 1.8 1.7 - 2.3 mg/dL   Potassium     Status: Normal   Result Value Ref Range    Potassium 3.8 3.4 - 5.3 mmol/L   Potassium     Status: Normal   Result Value Ref Range    Potassium 3.9 3.4 - 5.3 mmol/L   Basic metabolic panel     Status: Abnormal   Result Value Ref Range    Sodium 145 135 - 145 mmol/L    Potassium 3.9 3.4 - 5.3 mmol/L    Chloride 102 98 - 107 mmol/L    Carbon Dioxide (CO2) 33 (H) 22 - 29 mmol/L    Anion Gap 10 7 - 15 mmol/L    Urea Nitrogen 25.5 (H) 8.0 - 23.0 mg/dL    Creatinine 0.76 0.51 - 0.95 mg/dL    GFR Estimate 79 >60 mL/min/1.73m2    Calcium 8.8 8.8 - 10.4 mg/dL    Glucose 130 (H) 70 - 99 mg/dL   Comprehensive metabolic panel     Status: Abnormal   Result Value Ref Range    Sodium 143 135 - 145 mmol/L    Potassium 4.1 3.4 - 5.3 mmol/L    Carbon Dioxide (CO2) 36 (H) 22 - 29 mmol/L     Anion Gap 7 7 - 15 mmol/L    Urea Nitrogen 30.7 (H) 8.0 - 23.0 mg/dL    Creatinine 0.93 0.51 - 0.95 mg/dL    GFR Estimate 62 >60 mL/min/1.73m2    Calcium 8.3 (L) 8.8 - 10.4 mg/dL    Chloride 100 98 - 107 mmol/L    Glucose 106 (H) 70 - 99 mg/dL    Alkaline Phosphatase 80 40 - 150 U/L    AST 28 0 - 45 U/L    ALT 22 0 - 50 U/L    Protein Total 6.0 (L) 6.4 - 8.3 g/dL    Albumin 3.1 (L) 3.5 - 5.2 g/dL    Bilirubin Total 1.0 <=1.2 mg/dL   CBC with platelets     Status: Abnormal   Result Value Ref Range    WBC Count 11.6 (H) 4.0 - 11.0 10e3/uL    RBC Count 4.64 3.80 - 5.20 10e6/uL    Hemoglobin 14.1 11.7 - 15.7 g/dL    Hematocrit 44.5 35.0 - 47.0 %    MCV 96 78 - 100 fL    MCH 30.4 26.5 - 33.0 pg    MCHC 31.7 31.5 - 36.5 g/dL    RDW 14.7 10.0 - 15.0 %    Platelet Count 218 150 - 450 10e3/uL   Cystatin C with GFR     Status: Abnormal   Result Value Ref Range    Cystatin C 1.3 (H) 0.6 - 1.0 mg/L    GFR Calculated with Cystatin C 47 (L) >=60 mL/min/1.73m2    Narrative    eGFRcys in adults is calculated using the 2012 CKD-EPI cystatin c equation which includes age and gender (Rex et al., NEJM, DOI: 10.1056/JHJKka0797563)   Procalcitonin     Status: Normal   Result Value Ref Range    Procalcitonin 0.12 <0.50 ng/mL   CRP inflammation     Status: Abnormal   Result Value Ref Range    CRP Inflammation 11.47 (H) <5.00 mg/L   Magnesium     Status: Abnormal   Result Value Ref Range    Magnesium 2.4 (H) 1.7 - 2.3 mg/dL   Basic metabolic panel     Status: Abnormal   Result Value Ref Range    Sodium 136 135 - 145 mmol/L    Potassium 3.6 3.4 - 5.3 mmol/L    Chloride 97 (L) 98 - 107 mmol/L    Carbon Dioxide (CO2) 34 (H) 22 - 29 mmol/L    Anion Gap 5 (L) 7 - 15 mmol/L    Urea Nitrogen 40.3 (H) 8.0 - 23.0 mg/dL    Creatinine 1.09 (H) 0.51 - 0.95 mg/dL    GFR Estimate 51 (L) >60 mL/min/1.73m2    Calcium 7.9 (L) 8.8 - 10.4 mg/dL    Glucose 107 (H) 70 - 99 mg/dL   NT-proBNP     Status: Abnormal   Result Value Ref Range    NT-proBNP 5,847  (H) 0 - 624 pg/mL   CBC with platelets     Status: Normal   Result Value Ref Range    WBC Count 10.5 4.0 - 11.0 10e3/uL    RBC Count 4.38 3.80 - 5.20 10e6/uL    Hemoglobin 13.4 11.7 - 15.7 g/dL    Hematocrit 41.6 35.0 - 47.0 %    MCV 95 78 - 100 fL    MCH 30.6 26.5 - 33.0 pg    MCHC 32.2 31.5 - 36.5 g/dL    RDW 14.6 10.0 - 15.0 %    Platelet Count 205 150 - 450 10e3/uL   Platelet count     Status: Normal   Result Value Ref Range    Platelet Count 200 150 - 450 10e3/uL    MCV 96 78 - 100 fL   Magnesium     Status: Normal   Result Value Ref Range    Magnesium 2.0 1.7 - 2.3 mg/dL   Basic metabolic panel     Status: Abnormal   Result Value Ref Range    Sodium 140 135 - 145 mmol/L    Potassium 3.7 3.4 - 5.3 mmol/L    Chloride 99 98 - 107 mmol/L    Carbon Dioxide (CO2) 37 (H) 22 - 29 mmol/L    Anion Gap 4 (L) 7 - 15 mmol/L    Urea Nitrogen 32.3 (H) 8.0 - 23.0 mg/dL    Creatinine 0.67 0.51 - 0.95 mg/dL    GFR Estimate 88 >60 mL/min/1.73m2    Calcium 8.0 (L) 8.8 - 10.4 mg/dL    Glucose 108 (H) 70 - 99 mg/dL   EKG 12 lead     Status: None   Result Value Ref Range    Systolic Blood Pressure  mmHg    Diastolic Blood Pressure  mmHg    Ventricular Rate 102 BPM    Atrial Rate 102 BPM    OH Interval 194 ms    QRS Duration 118 ms     ms    QTc 456 ms    P Axis 41 degrees    R AXIS -13 degrees    T Axis -29 degrees    Interpretation ECG       Sinus tachycardia with Premature atrial complexes  Minimal voltage criteria for LVH, may be normal variant ( Jah product )  Inferior infarct (cited on or before 04-Nov-2024)  Abnormal ECG  When compared with ECG of 04-Nov-2024 16:11,  Premature atrial complexes are now Present  ST now depressed in Lateral leads  Nonspecific T wave abnormality now evident in Anterior leads  Unconfirmed report - interpretation of this ECG is computer generated - see medical record for final interpretation  Confirmed by - EMERGENCY ROOM, PHYSICIAN ([id number]),  BERNIE CAMPUZANO ([id number]) on 6/25/2025  6:35:01 AM     Echocardiogram Complete     Status: None   Result Value Ref Range    LVEF  40-45%     [facility]    [id number]  [id number]  [id number]  [id number]^ROB^ROHIT^     [facility]  Echocardiography Laboratory  [address] [place] [address]     Name: SENA MCGOVERN  MRN: [id number]  : 1945  Study Date: 2025 07:30 AM  Age: 80 yrs  Gender: Female  Patient Location: [facility] [place]  Reason For Study: Heart Failure  Ordering Physician: ROHIT RIVAS  Referring Physician: Valerie Vincent  Performed By: Jeanne Lezama     BSA: 1.8 m2  Height: 62 in  Weight: 178 lb  HR: 72  BP: 148/90 mmHg  ______________________________________________________________________________  Procedure  Echocardiogram with two-dimensional, color and spectral Doppler. Optison (NDC  #[id number]) given intravenously.  ______________________________________________________________________________  Interpretation Summary     Left ventricular systolic function is mild to moderately reduced.The visual  ejection fraction is 40-45%.Basal to mid lateral wall hypokinesia, basal to  mid inferior wall hypokinesia, inferolateral wall hypokinesia  The right ventricular systolic function is mildly reduced.The right ventricle  is mildly dilated.  There is severe mitral annular calcification.There is mild mitral  stenosis.There is moderate (2+) mitral regurgitation.  There is moderate (2+) tricuspid regurgitation.  There is moderate (2+) aortic regurgitation-peak aortic valve velocity 3.5  m/s, mean gradients 30 mmHg, aortic valve area 1.5 cmÂ .Aortic regurgitation  Doppler pressure half-time is low at 230 ms-this could indicate worse AI then  visually estimated, alternatively this could be due to high LV filling  pressure (however E/E prime ratio is in the indeterminate range)  Pulmonary hypertension- RVSP 16 mm hg +RA.  IVC diameter >2.1 cm collapsing <50% with sniff suggests a high RA pressure  estimated at 15 mmHg or greater.      Compared to prior study dated 11/5/2024 no significant changes     ______________________________________________________________________________  Left Ventricle  Left ventricular systolic function is mild to moderately reduced. The visual  ejection fraction is 40-45%. Basal to mid lateral wall hypokinesia, basal to  mid inferior wall hypokinesia, inferolateral wall hypokinesia.     Right Ventricle  The right ventricle is mildly dilated. The right ventricular systolic function  is mildly reduced.     Atria  The left atrium is moderately dilated. The right atrium is mildly dilated.  There is no color Doppler evidence of an atrial shunt.     Mitral Valve  There is severe mitral annular calcification. There is moderate (2+) mitral  regurgitation. There is mild mitral stenosis. The mean mitral valve gradient  is 3.5 mmHg.     Tricuspid Valve  There is moderate (2+) tricuspid regurgitation. The right ventricular systolic  pressure is approximated at 46.0 mmHg plus the right atrial pressure.  Pulmonary hypertension.     Aortic Valve  The aortic valve is trileaflet. There is moderate (2+) aortic regurgitation.  Aortic regurgitation Doppler pressure half-time is low at 230 ms-this could  indicate worse AI then visually estimated, alternatively this could be due to  high LV filling pressure (however E/E prime ratio is in the indeterminate  range). Moderate valvular aortic stenosis. Peak aortic valve velocity 3.5 m/s,  mean gradients 30 mmHg, aortic valve area 1.5 cmÂ .     Pulmonic Valve  There is mild (1+) pulmonic valvular regurgitation. There is no pulmonic  valvular stenosis.     Vessels  The aortic root is normal size. Normal size ascending aorta. IVC diameter >2.1  cm collapsing <50% with sniff suggests a high RA pressure estimated at 15 mmHg  or greater.     Pericardium  There is no pericardial effusion.     Rhythm  Sinus rhythm was  noted.  ______________________________________________________________________________  MMode/2D Measurements & Calculations  IVC diam: 2.2 cm     Ao root diam: 3.6 cm  LVOT diam: 2.1 cm  LVOT area: 3.4 cm2  Ao root diam index Ht(cm/m): 2.3  Ao root diam index BSA (cm/m2): 2.0  LA Volume (BP): 38.7 ml  LA Volume Index (BP): 21.3 ml/m2  RV Base: 3.4 cm  TAPSE: 1.2 cm     Doppler Measurements & Calculations  MV E max garcia: 77.2 cm/sec  MV A max garcia: 143.7 cm/sec  MV E/A: 0.54  MV max P.8 mmHg  MV mean PG: 3.5 mmHg  MV V2 VTI: 36.4 cm  MVA(VTI): 3.2 cm2  MV dec slope: 509.7 cm/sec2  MV dec time: 0.15 sec  Ao V2 max: 343.1 cm/sec  Ao max P.2 mmHg  Ao V2 mean: 246.6 cm/sec  Ao mean P.7 mmHg  Ao V2 VTI: 76.4 cm  LIYA(I,D): 1.5 cm2  LIYA(V,D): 1.5 cm2  AI P1/2t: 218.0 msec  LV V1 max P.9 mmHg  LV V1 max: 157.2 cm/sec  LV V1 VTI: 34.8 cm  SV(LVOT): 116.7 ml  SI(LVOT): 64.1 ml/m2  PA V2 max: 115.5 cm/sec  PA max P.3 mmHg  PA acc time: 0.17 sec  PI end-d garcia: 187.7 cm/sec  TR max garcia: 322.8 cm/sec  TR max P.0 mmHg  AV Garcia Ratio (DI): 0.46  LIYA Index (cm2/m2): 0.84  E/E' avg: 10.6  Lateral E/e': 8.3  Medial E/e': 12.9  RV S Garcia: 9.5 cm/sec     ______________________________________________________________________________  Report approved by: Juve Glasgow MD on 2025 09:29 AM         CBC with differential     Status: Abnormal    Narrative    The following orders were created for panel order CBC with differential.  Procedure                               Abnormality         Status                     ---------                               -----------         ------                     CBC with platelets and ...[[id number]]  Abnormal            Final result                 Please view results for these tests on the individual orders.

## 2025-06-28 NOTE — PLAN OF CARE
"92% on 3L 02. SOB and PRETTY. Diminished LS. +1 edema to bilateral ankles. +2 edema to bilateral feet. Complained of pain to back and pain med's given.     Goal Outcome Evaluation:      Plan of Care Reviewed With: patient    Overall Patient Progress: no changeOverall Patient Progress: no change    Outcome Evaluation: Comfort cares      Problem: Adult Inpatient Plan of Care  Goal: Plan of Care Review  Description: The Plan of Care Review/Shift note should be completed every shift.  The Outcome Evaluation is a brief statement about your assessment that the patient is improving, declining, or no change.  This information will be displayed automatically on your shift  note.  6/28/2025 0443 by Lora Krueger RN  Outcome: Progressing  Flowsheets (Taken 6/28/2025 0443)  Plan of Care Reviewed With: patient  Overall Patient Progress: no change  6/28/2025 0443 by Lora Krueger RN  Outcome: Progressing  Flowsheets (Taken 6/28/2025 0443)  Outcome Evaluation: Comfort cares  Plan of Care Reviewed With: patient  Overall Patient Progress: no change  6/28/2025 0442 by Lora Krueger RN  Outcome: Progressing  Flowsheets (Taken 6/28/2025 0442)  Outcome Evaluation: Comfort cares  Goal: Patient-Specific Goal (Individualized)  Description: You can add care plan individualizations to a care plan. Examples of Individualization might be:  \"Parent requests to be called daily at 9am for status\", \"I have a hard time hearing out of my right ear\", or \"Do not touch me to wake me up as it startles  me\".  6/28/2025 0443 by Lora Krueger RN  Outcome: Progressing  6/28/2025 0443 by Lora Krueger RN  Outcome: Progressing  6/28/2025 0442 by Lora Krueger RN  Outcome: Progressing  Goal: Absence of Hospital-Acquired Illness or Injury  6/28/2025 0443 by Lora Krueger RN  Outcome: Progressing  6/28/2025 0443 by Lora Krueger RN  Outcome: Progressing  6/28/2025 0442 by Lora Krueger RN  Outcome: Progressing  Intervention: Identify and " Manage Fall Risk  Recent Flowsheet Documentation  Taken 6/28/2025 0015 by Lora Krueger RN  Safety Promotion/Fall Prevention:   clutter free environment maintained   lighting adjusted   nonskid shoes/slippers when out of bed  Intervention: Prevent Skin Injury  Recent Flowsheet Documentation  Taken 6/28/2025 0015 by Lora Krueger RN  Body Position: position changed independently  Intervention: Prevent and Manage VTE (Venous Thromboembolism) Risk  Recent Flowsheet Documentation  Taken 6/28/2025 0015 by Lora Krueger RN  VTE Prevention/Management: SCDs off (sequential compression devices)  Intervention: Prevent Infection  Recent Flowsheet Documentation  Taken 6/28/2025 0015 by Lora Krueger RN  Infection Prevention:   hand hygiene promoted   rest/sleep promoted   single patient room provided  Goal: Optimal Comfort and Wellbeing  6/28/2025 0443 by Lora Krueger RN  Outcome: Progressing  6/28/2025 0443 by Lora Krueger RN  Outcome: Progressing  6/28/2025 0442 by Lora Krueger RN  Outcome: Progressing  Intervention: Monitor Pain and Promote Comfort  Recent Flowsheet Documentation  Taken 6/28/2025 0340 by Lora Krueger RN  Pain Management Interventions: medication (see MAR)  Goal: Readiness for Transition of Care  6/28/2025 0443 by Lora Krueger RN  Outcome: Progressing  6/28/2025 0443 by Lora Krueger RN  Outcome: Progressing  6/28/2025 0442 by Lora Krueger RN  Outcome: Progressing     Problem: Palliative Care  Goal: Enhanced Quality of Life  6/28/2025 0443 by Lora Krueger RN  Outcome: Progressing  6/28/2025 0443 by Lora Krueger RN  Outcome: Progressing  6/28/2025 0442 by Lora Krueger RN  Outcome: Progressing  Intervention: Maximize Comfort  Recent Flowsheet Documentation  Taken 6/28/2025 0340 by Lora Krueger RN  Pain Management Interventions: medication (see MAR)

## 2025-06-28 NOTE — PLAN OF CARE
Occupational Therapy Discharge Summary    Reason for therapy discharge:    Change in medical status.    Progress towards therapy goal(s). See goals on Care Plan in Epic electronic health record for goal details.  Goals not met.  Barriers to achieving goals:    Pt is now comfort cares and the discharge plan is to a care suite with hospice.    Therapy recommendation(s):    No further therapy is recommended.

## 2025-06-28 NOTE — PLAN OF CARE
" Comfort cares. Alert to self only. Disoriented X3. Turn and repositioned. Shortness of breath/dyspnea on exertion.  On oxygen 3L NC for comfort. Dressing changed to Left elbow and Right calf changed,. Bladder scanned for 310 and straight catheted for 500 mls. Continue POC and monitor       Problem: Adult Inpatient Plan of Care  Goal: Plan of Care Review  Description: The Plan of Care Review/Shift note should be completed every shift.  The Outcome Evaluation is a brief statement about your assessment that the patient is improving, declining, or no change.  This information will be displayed automatically on your shift  note.  Outcome: Progressing  Flowsheets (Taken 6/27/2025 2147)  Outcome Evaluation: patient on comfort cares. Turn and repostioned. Increased SOB.  Plan of Care Reviewed With: patient  Overall Patient Progress: no change  Goal: Patient-Specific Goal (Individualized)  Description: You can add care plan individualizations to a care plan. Examples of Individualization might be:  \"Parent requests to be called daily at 9am for status\", \"I have a hard time hearing out of my right ear\", or \"Do not touch me to wake me up as it startles  me\".  Outcome: Progressing  Goal: Absence of Hospital-Acquired Illness or Injury  Outcome: Progressing  Intervention: Identify and Manage Fall Risk  Recent Flowsheet Documentation  Taken 6/27/2025 1623 by Christy Be RN  Safety Promotion/Fall Prevention:   activity supervised   nonskid shoes/slippers when out of bed   room near nurse's station   safety round/check completed   treat reversible contributory factors  Intervention: Prevent Skin Injury  Recent Flowsheet Documentation  Taken 6/27/2025 1623 by Christy Be RN  Body Position: position changed independently  Intervention: Prevent and Manage VTE (Venous Thromboembolism) Risk  Recent Flowsheet Documentation  Taken 6/27/2025 1623 by Christy Be RN  VTE Prevention/Management: SCDs on (sequential " compression devices)  Intervention: Prevent Infection  Recent Flowsheet Documentation  Taken 6/27/2025 1623 by Christy Be RN  Infection Prevention:   hand hygiene promoted   single patient room provided  Goal: Optimal Comfort and Wellbeing  Outcome: Progressing  Goal: Readiness for Transition of Care  Outcome: Progressing     Problem: Delirium  Goal: Improved Behavioral Control  Intervention: Minimize Safety Risk  Recent Flowsheet Documentation  Taken 6/27/2025 1623 by hCristy Be RN  Enhanced Safety Measures:   room near unit station   patient/family teach back on injury risk   Pre/Post Op education on fall prevention     Problem: Heart Failure  Goal: Optimal Functional Ability  Intervention: Optimize Functional Ability  Recent Flowsheet Documentation  Taken 6/27/2025 1623 by Christy Be RN  Activity Management: activity adjusted per tolerance  Goal: Effective Oxygenation and Ventilation  Intervention: Promote Airway Secretion Clearance  Recent Flowsheet Documentation  Taken 6/27/2025 1623 by Christy Be RN  Cough And Deep Breathing: done independently per patient  Activity Management: activity adjusted per tolerance  Intervention: Optimize Oxygenation and Ventilation  Recent Flowsheet Documentation  Taken 6/27/2025 1623 by Christy Be RN  Head of Bed (HOB) Positioning: HOB at 30-45 degrees  Goal: Effective Breathing Pattern During Sleep  Intervention: Monitor and Manage Obstructive Sleep Apnea  Recent Flowsheet Documentation  Taken 6/27/2025 1623 by Christy Be RN  Medication Review/Management: medications reviewed   Goal Outcome Evaluation:      Plan of Care Reviewed With: patient    Overall Patient Progress: no changeOverall Patient Progress: no change    Outcome Evaluation: patient on comfort cares. Turn and repostioned. Increased SOB.

## 2025-06-28 NOTE — PROGRESS NOTES
Care Management Follow Up    Length of Stay (days): 4    Expected Discharge Date: 07/01/2025     Concerns to be Addressed: discharge planning     Patient plan of care discussed at interdisciplinary rounds: Yes    Anticipated Discharge Disposition:                Anticipated Discharge Services:    Anticipated Discharge DME:      Patient/family educated on Medicare website which has current facility and service quality ratings:    Education Provided on the Discharge Plan:    Patient/Family in Agreement with the Plan: yes    Referrals Placed by CM/SW:    Private pay costs discussed: Not applicable    Discussed  Partnership in Safe Discharge Planning  document with patient/family: No     Handoff Completed: No, handoff not indicated or clinically appropriate    Additional Information:  [facility] received call from pt's Chari tirado. Chari stated that she is working with [facility] to acquire a care suite for pt. Chari to tour [facility] tomorrow. [facility] requesting pt records. [facility] sent records to Lesly at [facility].     Chari additionally stated that they would like to move forward with [facility]. [facility] sent referral to [facility].      Next Steps: SW to follow for discharge.     DARIO Patel, LGSW  [facility] Care Coordinator/ Med Surg [address] [place] [address]  [phone]

## 2025-06-28 NOTE — PLAN OF CARE
Physical Therapy: Orders received. Chart reviewed and discussed with care team.? Physical Therapy not indicated because patient has transitioned to comfort cares with plan for hospice at discharge.  No IP PT needs identified, PT orders completed.? Defer discharge recommendations to care team.?

## 2025-06-29 LAB
ANION GAP SERPL CALCULATED.3IONS-SCNC: 6 MMOL/L (ref 7–15)
BUN SERPL-MCNC: 23.1 MG/DL (ref 8–23)
CALCIUM SERPL-MCNC: 8.3 MG/DL (ref 8.8–10.4)
CHLORIDE SERPL-SCNC: 99 MMOL/L (ref 98–107)
CREAT SERPL-MCNC: 0.46 MG/DL (ref 0.51–0.95)
EGFRCR SERPLBLD CKD-EPI 2021: >90 ML/MIN/1.73M2
ERYTHROCYTE [DISTWIDTH] IN BLOOD BY AUTOMATED COUNT: 14.2 % (ref 10–15)
GLUCOSE SERPL-MCNC: 106 MG/DL (ref 70–99)
HCO3 SERPL-SCNC: 35 MMOL/L (ref 22–29)
HCT VFR BLD AUTO: 42.5 % (ref 35–47)
HGB BLD-MCNC: 13.5 G/DL (ref 11.7–15.7)
MCH RBC QN AUTO: 30.3 PG (ref 26.5–33)
MCHC RBC AUTO-ENTMCNC: 31.8 G/DL (ref 31.5–36.5)
MCV RBC AUTO: 96 FL (ref 78–100)
PLATELET # BLD AUTO: 208 10E3/UL (ref 150–450)
POTASSIUM SERPL-SCNC: 4.4 MMOL/L (ref 3.4–5.3)
RBC # BLD AUTO: 4.45 10E6/UL (ref 3.8–5.2)
SODIUM SERPL-SCNC: 140 MMOL/L (ref 135–145)
WBC # BLD AUTO: 9.5 10E3/UL (ref 4–11)

## 2025-06-29 PROCEDURE — 250N000011 HC RX IP 250 OP 636: Performed by: STUDENT IN AN ORGANIZED HEALTH CARE EDUCATION/TRAINING PROGRAM

## 2025-06-29 PROCEDURE — 120N000001 HC R&B MED SURG/OB

## 2025-06-29 PROCEDURE — 250N000013 HC RX MED GY IP 250 OP 250 PS 637: Performed by: STUDENT IN AN ORGANIZED HEALTH CARE EDUCATION/TRAINING PROGRAM

## 2025-06-29 PROCEDURE — 94640 AIRWAY INHALATION TREATMENT: CPT | Mod: 76

## 2025-06-29 PROCEDURE — 94640 AIRWAY INHALATION TREATMENT: CPT

## 2025-06-29 PROCEDURE — 250N000013 HC RX MED GY IP 250 OP 250 PS 637: Performed by: CLINICAL NURSE SPECIALIST

## 2025-06-29 PROCEDURE — 36415 COLL VENOUS BLD VENIPUNCTURE: CPT | Performed by: INTERNAL MEDICINE

## 2025-06-29 PROCEDURE — 250N000009 HC RX 250: Performed by: INTERNAL MEDICINE

## 2025-06-29 PROCEDURE — 85018 HEMOGLOBIN: CPT | Performed by: INTERNAL MEDICINE

## 2025-06-29 PROCEDURE — 99232 SBSQ HOSP IP/OBS MODERATE 35: CPT | Performed by: INTERNAL MEDICINE

## 2025-06-29 PROCEDURE — 80048 BASIC METABOLIC PNL TOTAL CA: CPT | Performed by: INTERNAL MEDICINE

## 2025-06-29 PROCEDURE — 999N000157 HC STATISTIC RCP TIME EA 10 MIN

## 2025-06-29 RX ADMIN — DORZOLAMIDE HYDROCHLORIDE AND TIMOLOL MALEATE 1 DROP: 20; 5 SOLUTION OPHTHALMIC at 10:17

## 2025-06-29 RX ADMIN — ENOXAPARIN SODIUM 30 MG: 30 INJECTION SUBCUTANEOUS at 18:49

## 2025-06-29 RX ADMIN — DORZOLAMIDE HYDROCHLORIDE AND TIMOLOL MALEATE 1 DROP: 20; 5 SOLUTION OPHTHALMIC at 21:19

## 2025-06-29 RX ADMIN — OXYCODONE HYDROCHLORIDE 2.5 MG: 100 SOLUTION ORAL at 02:40

## 2025-06-29 RX ADMIN — LATANOPROST 1 DROP: 50 SOLUTION OPHTHALMIC at 21:19

## 2025-06-29 RX ADMIN — BRIMONIDINE TARTRATE 1 DROP: 2 SOLUTION/ DROPS OPHTHALMIC at 21:19

## 2025-06-29 RX ADMIN — IPRATROPIUM BROMIDE AND ALBUTEROL SULFATE 3 ML: .5; 3 SOLUTION RESPIRATORY (INHALATION) at 12:32

## 2025-06-29 RX ADMIN — IPRATROPIUM BROMIDE AND ALBUTEROL SULFATE 3 ML: .5; 3 SOLUTION RESPIRATORY (INHALATION) at 07:39

## 2025-06-29 RX ADMIN — ASPIRIN 81 MG: 81 TABLET, DELAYED RELEASE ORAL at 10:09

## 2025-06-29 RX ADMIN — IPRATROPIUM BROMIDE AND ALBUTEROL SULFATE 3 ML: .5; 3 SOLUTION RESPIRATORY (INHALATION) at 16:30

## 2025-06-29 RX ADMIN — FLUTICASONE FUROATE AND VILANTEROL TRIFENATATE 1 PUFF: 100; 25 POWDER RESPIRATORY (INHALATION) at 07:38

## 2025-06-29 RX ADMIN — IPRATROPIUM BROMIDE AND ALBUTEROL SULFATE 3 ML: .5; 3 SOLUTION RESPIRATORY (INHALATION) at 20:51

## 2025-06-29 RX ADMIN — BRIMONIDINE TARTRATE 1 DROP: 2 SOLUTION/ DROPS OPHTHALMIC at 10:11

## 2025-06-29 ASSESSMENT — ACTIVITIES OF DAILY LIVING (ADL)
ADLS_ACUITY_SCORE: 46
ADLS_ACUITY_SCORE: 50
ADLS_ACUITY_SCORE: 47
ADLS_ACUITY_SCORE: 46
ADLS_ACUITY_SCORE: 50
ADLS_ACUITY_SCORE: 50
ADLS_ACUITY_SCORE: 46
ADLS_ACUITY_SCORE: 46
ADLS_ACUITY_SCORE: 50
ADLS_ACUITY_SCORE: 46
ADLS_ACUITY_SCORE: 50
ADLS_ACUITY_SCORE: 50
ADLS_ACUITY_SCORE: 51

## 2025-06-29 NOTE — PROGRESS NOTES
[facility]    Medicine Progress Note - Hospitalist Service    Date of Admission:  6/24/2025    Assessment & Plan   Berenice Krause is a 80 year old female admitted on 6/24/2025 lower extremity swelling and SOB due thought due to exacerbation of HFmrEF. He was also found to have an apparent large hematoma over the dorsum of the right foot.     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     In the emergency department the patient was afebrile. Her heart rate was . Blood pressures 117-154/. She was requiring 3L via nasal cannula to maintain oxygen saturations >92%.    Laboratory studies were notable for bicarb 31, BUN 30.7, creatinine 0.67, glucose 188, NT proBNP 13,371, troponin less than 6, WBC 12.2.    ECG: sinus tachycardia, but without ST segment dynamic changes.  She did have some PACs as well as T wave inversions in leads III, V1, and V2; morphology is similar to prior ECG obtained 11/2024.    Chest x-ray: interstitial prominence and hazy interstitial opacities.   ED interventions: Lasix 40 mg IV and was admitted for further cares of suspected heart failure exacerbation.     Ms. Krause was admitted to a medical bed on telemetry after having received a dose of Furosemide 40 mg IV in the ED. She got a second dose before it was noted that her creatinine was rising and her BP was falling. With these findings, Lisinopril was reduced to 2.5 mg daily with parameters to hold and the lasix was stopped.     Her Echo is not significantly different than the one completed in 11/2024, but note that she has both reduced EF and multiple moderate valvular insufficiencies.     The pt is quite limited in function. She is able to walk several feet at baseline, but with a walker and assist. Family has recognized that she has developed significant memory  "deficits. She is unable to answer many questions about her preferences for longer term goals of care. She appears to make sense, but mostly seems to avoid answering. Her brother and SINAN are close and involved family members.  They are willing to be decision makers for her, but they are not \"designated\" POA.     Today, 6/29, the pt is pleasant and alert but markedly disoriented. Her niece, Chari, is present at the bedside.  The pt is able to name her as well as other family members, but thinks that she is at a friends house, but can't remember which.  She ate     DX:  Hypoxic resp failure and peripheral edema in the setting of known HFmrEF, moderate AORTIC STENOSIS, Severe Emphysema (not previously oxygen-dependent) and severe Thoracic Kyphosis. NT-BNP 13K and TTE from 11/2024 40-45%. Echo obtained hospital stay unchanged from previous. Hypoxia may be related to CHF v poss infiltrates.  Severe emphysema based on CT scan.   Right foot bruising was initially of some concern, but the pulse is brisk and the edema has resolved.   HARJIT due to intolerance to diuresis. This bodes poorly for CHF management. Baseline creat suggests slightly better than GFR based on Cystatin C.  Mild hyperglycemia in the setting of pre-diabetes NIDDM. HbA1c 6.0.   Leukocytosis with left shift. Resolved. CRP 11 and Procalcitonin 0.12 - not consistent with significant infection. No empiric abx.  HTN.  Pt became hypotensive and BP meds have been stopped.   Hx Central retinal artery occlusion, left eye.   Suspect multi-infarct dementia strongly suspected.  Cognitive impairment is suggested in Dr. Ennis' admission note.  I also have observed that the pt is oriented to self only.  She can identify family members also. And although she often appears appropriate, she evades questions that are difficult and often is frankly confused. She has known diffuse changes on MRI of the Brain \"typically associated with chronic hypertension or amyloid " "angiopathy\".   Severe malnutrition, suspect that this is related to undiagnosed aspiration.  She seems to cough with every swallow and has a wet cough with eating.   Goals of care. Discussed with family that the pt is likely at least, to need to return to the hospital relatively frequently due to her many combined issues.   PLAN:  Cont to hold diuretics and stop Lisinopril due to worsening HARJIT along with hypotension.  Other usual home meds are resumed.   Briefly discussed with the pt the cardiologist's (Dr. Wong) assessment. Palliative care was consulted and ultimately, a Hospice plan for dispo was planned.   Anticipate discharge to SNF with plan to enter Hospice.     Diet: Regular Diet Adult    DVT Prophylaxis: Enoxaparin (Lovenox) SQ  Herbert Catheter: PRESENT, indication:    Lines: None     Cardiac Monitoring: None  Code Status: No CPR- Do NOT Intubate      Clinically Significant Risk Factors               # Hypoalbuminemia: Lowest albumin = 3.1 g/dL at 6/25/2025  7:21 AM, will monitor as appropriate     # Hypertension: Noted on problem list    # Heart failure, NOS: heart failure noted on the problem list and last echo with EF 40-50%               # Financial/Environmental Concerns:           Social Drivers of Health    Tobacco Use: Medium Risk (11/19/2024)    Received from [facility]    Patient History     Smoking Tobacco Use: Former     Smokeless Tobacco Use: Never          Disposition Plan     Medically Ready for Discharge: Anticipated Tomorrow      Juan Solis MD  Hospitalist Service  [facility]  Securely message with Bringme (more info)  Text page via [employer] Paging/Directory   ______________________________________________________________________    Interval History   Stable night per nursing documentation.     Pt is pleasantly interactive, but is not fully oriented to the situation.     Physical Exam   Vital Signs:       Pulse: 93   Resp: 18 SpO2: 92 % " O2 Device: Nasal cannula Oxygen Delivery: 3 LPM  Weight: 77 lbs 8 oz    General Appearance: Very thin, frail, elderly woman in NAD. Calm in bed on O2 at 2 LPM by NC.  Severe kyphosis noted. Marked decrease in muscular bulk and subcutaneous fat.   Respiratory: No increased WOB. Decreased air entry at the left posterior. No obvious rales or wheeze.  Severe kyphoscoliosis.  Cardiovascular: RRR with soft systolic Aortic as well as Tricuspid area murmurs.   GI: soft, NTND  Skin: the right foot is bruised-appearing with purple discoloration. Dorsalis pedis pulse easily palpated on the right foot at this time.   Trace edema otherwise. Decreased muscular bulk noted over the upper mmore so than the lower extremities.     Medical Decision Making       25 MINUTES SPENT BY ME on the date of service doing chart review, history, exam, documentation & further activities per the note.      Data   Results for orders placed or performed during the hospital encounter of 06/24/25   Chest XR,  PA & LAT     Status: None    Narrative    EXAM: XR CHEST 2 VIEWS  LOCATION: [facility]  DATE: 6/24/2025    INDICATION: Dyspnea, BLE swelling.  COMPARISON: 1/23/2018.      Impression    IMPRESSION:   Cardiomegaly. Severe thoracic kyphosis limits assessment. Mild streaky opacity suggested at the lower lungs that could be mild airspace disease versus atelectasis. Bilateral interstitial prominence and hazy opacities suggestive of mild edema.   XR Foot Port Right 3 Views     Status: None    Narrative    EXAM: XR FOOT PORT RIGHT 3 VIEWS  LOCATION: [facility]  DATE: 6/25/2025    INDICATION: large hematoma; r o underlying fracture  COMPARISON: None.      Impression    IMPRESSION: 3 views right foot. Marked soft tissue swelling about the dorsum of the metatarsals and tarsal metatarsal junction. No acute fractures seen. Right foot otherwise unremarkable. If continued clinical concern, additional cross-sectional  imaging   should be considered.   CT Chest w/o Contrast     Status: None    Narrative    EXAM: CT CHEST W/O CONTRAST  LOCATION: [facility]  DATE: 6/26/2025    INDICATION: Patient with severe kyphoscoliosis here with hypoxia. unclear whether CHF, advanced COPD or aspiration problems.  COMPARISON: Chest radiograph 6/24/2025.  TECHNIQUE: CT chest without IV contrast. Multiplanar reformats were obtained. Dose reduction techniques were used.  CONTRAST: None.    FINDINGS:   LUNGS AND PLEURA: Severe upper lobe predominant panlobular emphysema. There is some trace interlobular septal thickening in the lung bases with small bilateral pleural effusions and associated compressive atelectasis, left greater than right. No lobar   airspace consolidation or pneumothorax. 4 mm noncalcified right lower lobe pulmonary nodule (series 4 image 115). Large calcified granuloma in the left upper lobe.    MEDIASTINUM/AXILLAE: Prior median sternotomy. No suspicious lymphadenopathy. No pericardial effusion.    CORONARY ARTERY CALCIFICATION: Severe.    UPPER ABDOMEN: Unremarkable.    MUSCULOSKELETAL: Severe, exaggerated thoracic kyphosis with multiple mid and lower thoracic vertebral body compression deformities, not significantly changed from most recent chest radiograph. No definite acute bony abnormality.      Impression    IMPRESSION:   1.  Severe emphysema with likely mild superimposed pulmonary edema and small bilateral pleural effusions.  2.  Incidental 4 mm noncalcified right lower lobe pulmonary nodule, consider follow-up described below.    REFERENCE:  Guidelines for Management of Incidental Pulmonary Nodules Detected on CT Images: From the Fleischner Society 2017.   Guidelines apply to incidental nodules in patients who are 35 years or older.  Guidelines do not apply to lung cancer screening, patients with immunosuppression, or patients with known primary cancer.    SINGLE NODULE  Nodule size <6 mm  Low-risk  patients: No follow-up needed.  High-risk patients: Optional follow-up CT at 12 months.         [place]or Draw     Status: None    Narrative    The following orders were created for panel order Mc[place]or Draw.  Procedure                               Abnormality         Status                     ---------                               -----------         ------                     Extra Blue Top Tube[[id number]]                             Final result               Extra Red Top Tube[[id number]]                              Final result               Extra Green Top (Lithiu...[[id number]]                      Final result               Extra Purple Top Tube[[id number]]                           Final result                 Please view results for these tests on the individual orders.   Extra Blue Top Tube     Status: None   Result Value Ref Range    Hold Specimen JIC    Extra Red Top Tube     Status: None   Result Value Ref Range    Hold Specimen JIC    Extra Green Top (Lithium Heparin) Tube     Status: None   Result Value Ref Range    Hold Specimen JIC    Extra Purple Top Tube     Status: None   Result Value Ref Range    Hold Specimen JIC    Comprehensive metabolic panel     Status: Abnormal   Result Value Ref Range    Sodium 142 135 - 145 mmol/L    Potassium 3.8 3.4 - 5.3 mmol/L    Carbon Dioxide (CO2) 31 (H) 22 - 29 mmol/L    Anion Gap 8 7 - 15 mmol/L    Urea Nitrogen 30.7 (H) 8.0 - 23.0 mg/dL    Creatinine 0.67 0.51 - 0.95 mg/dL    GFR Estimate 88 >60 mL/min/1.73m2    Calcium 8.6 (L) 8.8 - 10.4 mg/dL    Chloride 103 98 - 107 mmol/L    Glucose 188 (H) 70 - 99 mg/dL    Alkaline Phosphatase 93 40 - 150 U/L    AST 25 0 - 45 U/L    ALT 30 0 - 50 U/L    Protein Total 6.7 6.4 - 8.3 g/dL    Albumin 3.6 3.5 - 5.2 g/dL    Bilirubin Total 0.9 <=1.2 mg/dL   NT-proBNP     Status: Abnormal   Result Value Ref Range    NT-proBNP 13,371 (H) 0 - 624 pg/mL   Troponin T, High Sensitivity     Status: Normal   Result Value Ref Range     Troponin T, High Sensitivity <6 <=14 ng/L   CBC with platelets and differential     Status: Abnormal   Result Value Ref Range    WBC Count 12.2 (H) 4.0 - 11.0 10e3/uL    RBC Count 4.89 3.80 - 5.20 10e6/uL    Hemoglobin 14.9 11.7 - 15.7 g/dL    Hematocrit 46.5 35.0 - 47.0 %    MCV 95 78 - 100 fL    MCH 30.5 26.5 - 33.0 pg    MCHC 32.0 31.5 - 36.5 g/dL    RDW 15.2 (H) 10.0 - 15.0 %    Platelet Count 279 150 - 450 10e3/uL    % Neutrophils 77 %    % Lymphocytes 14 %    % Monocytes 8 %    % Eosinophils 1 %    % Basophils 1 %    % Immature Granulocytes 1 %    NRBCs per 100 WBC 0 <1 /100    Absolute Neutrophils 9.4 (H) 1.6 - 8.3 10e3/uL    Absolute Lymphocytes 1.8 0.8 - 5.3 10e3/uL    Absolute Monocytes 0.9 0.0 - 1.3 10e3/uL    Absolute Eosinophils 0.1 0.0 - 0.7 10e3/uL    Absolute Basophils 0.1 0.0 - 0.2 10e3/uL    Absolute Immature Granulocytes 0.1 <=0.4 10e3/uL    Absolute NRBCs 0.0 10e3/uL   Hemoglobin A1c     Status: Abnormal   Result Value Ref Range    Estimated Average Glucose 126 (H) <117 mg/dL    Hemoglobin A1C 6.0 (H) <5.7 %   Creatinine     Status: Normal   Result Value Ref Range    Creatinine 0.76 0.51 - 0.95 mg/dL    GFR Estimate 79 >60 mL/min/1.73m2   Magnesium     Status: Normal   Result Value Ref Range    Magnesium 1.8 1.7 - 2.3 mg/dL   Potassium     Status: Normal   Result Value Ref Range    Potassium 3.8 3.4 - 5.3 mmol/L   Potassium     Status: Normal   Result Value Ref Range    Potassium 3.9 3.4 - 5.3 mmol/L   Basic metabolic panel     Status: Abnormal   Result Value Ref Range    Sodium 145 135 - 145 mmol/L    Potassium 3.9 3.4 - 5.3 mmol/L    Chloride 102 98 - 107 mmol/L    Carbon Dioxide (CO2) 33 (H) 22 - 29 mmol/L    Anion Gap 10 7 - 15 mmol/L    Urea Nitrogen 25.5 (H) 8.0 - 23.0 mg/dL    Creatinine 0.76 0.51 - 0.95 mg/dL    GFR Estimate 79 >60 mL/min/1.73m2    Calcium 8.8 8.8 - 10.4 mg/dL    Glucose 130 (H) 70 - 99 mg/dL   Comprehensive metabolic panel     Status: Abnormal   Result Value Ref Range     Sodium 143 135 - 145 mmol/L    Potassium 4.1 3.4 - 5.3 mmol/L    Carbon Dioxide (CO2) 36 (H) 22 - 29 mmol/L    Anion Gap 7 7 - 15 mmol/L    Urea Nitrogen 30.7 (H) 8.0 - 23.0 mg/dL    Creatinine 0.93 0.51 - 0.95 mg/dL    GFR Estimate 62 >60 mL/min/1.73m2    Calcium 8.3 (L) 8.8 - 10.4 mg/dL    Chloride 100 98 - 107 mmol/L    Glucose 106 (H) 70 - 99 mg/dL    Alkaline Phosphatase 80 40 - 150 U/L    AST 28 0 - 45 U/L    ALT 22 0 - 50 U/L    Protein Total 6.0 (L) 6.4 - 8.3 g/dL    Albumin 3.1 (L) 3.5 - 5.2 g/dL    Bilirubin Total 1.0 <=1.2 mg/dL   CBC with platelets     Status: Abnormal   Result Value Ref Range    WBC Count 11.6 (H) 4.0 - 11.0 10e3/uL    RBC Count 4.64 3.80 - 5.20 10e6/uL    Hemoglobin 14.1 11.7 - 15.7 g/dL    Hematocrit 44.5 35.0 - 47.0 %    MCV 96 78 - 100 fL    MCH 30.4 26.5 - 33.0 pg    MCHC 31.7 31.5 - 36.5 g/dL    RDW 14.7 10.0 - 15.0 %    Platelet Count 218 150 - 450 10e3/uL   Cystatin C with GFR     Status: Abnormal   Result Value Ref Range    Cystatin C 1.3 (H) 0.6 - 1.0 mg/L    GFR Calculated with Cystatin C 47 (L) >=60 mL/min/1.73m2    Narrative    eGFRcys in adults is calculated using the 2012 CKD-EPI cystatin c equation which includes age and gender (Rex et al., NEJM, DOI: 10.1056/KIOHxi6829065)   Procalcitonin     Status: Normal   Result Value Ref Range    Procalcitonin 0.12 <0.50 ng/mL   CRP inflammation     Status: Abnormal   Result Value Ref Range    CRP Inflammation 11.47 (H) <5.00 mg/L   Magnesium     Status: Abnormal   Result Value Ref Range    Magnesium 2.4 (H) 1.7 - 2.3 mg/dL   Basic metabolic panel     Status: Abnormal   Result Value Ref Range    Sodium 136 135 - 145 mmol/L    Potassium 3.6 3.4 - 5.3 mmol/L    Chloride 97 (L) 98 - 107 mmol/L    Carbon Dioxide (CO2) 34 (H) 22 - 29 mmol/L    Anion Gap 5 (L) 7 - 15 mmol/L    Urea Nitrogen 40.3 (H) 8.0 - 23.0 mg/dL    Creatinine 1.09 (H) 0.51 - 0.95 mg/dL    GFR Estimate 51 (L) >60 mL/min/1.73m2    Calcium 7.9 (L) 8.8 - 10.4 mg/dL     Glucose 107 (H) 70 - 99 mg/dL   NT-proBNP     Status: Abnormal   Result Value Ref Range    NT-proBNP 5,847 (H) 0 - 624 pg/mL   CBC with platelets     Status: Normal   Result Value Ref Range    WBC Count 10.5 4.0 - 11.0 10e3/uL    RBC Count 4.38 3.80 - 5.20 10e6/uL    Hemoglobin 13.4 11.7 - 15.7 g/dL    Hematocrit 41.6 35.0 - 47.0 %    MCV 95 78 - 100 fL    MCH 30.6 26.5 - 33.0 pg    MCHC 32.2 31.5 - 36.5 g/dL    RDW 14.6 10.0 - 15.0 %    Platelet Count 205 150 - 450 10e3/uL   Platelet count     Status: Normal   Result Value Ref Range    Platelet Count 200 150 - 450 10e3/uL    MCV 96 78 - 100 fL   Magnesium     Status: Normal   Result Value Ref Range    Magnesium 2.0 1.7 - 2.3 mg/dL   Basic metabolic panel     Status: Abnormal   Result Value Ref Range    Sodium 140 135 - 145 mmol/L    Potassium 3.7 3.4 - 5.3 mmol/L    Chloride 99 98 - 107 mmol/L    Carbon Dioxide (CO2) 37 (H) 22 - 29 mmol/L    Anion Gap 4 (L) 7 - 15 mmol/L    Urea Nitrogen 32.3 (H) 8.0 - 23.0 mg/dL    Creatinine 0.67 0.51 - 0.95 mg/dL    GFR Estimate 88 >60 mL/min/1.73m2    Calcium 8.0 (L) 8.8 - 10.4 mg/dL    Glucose 108 (H) 70 - 99 mg/dL   Basic metabolic panel     Status: Abnormal   Result Value Ref Range    Sodium 140 135 - 145 mmol/L    Potassium 4.4 3.4 - 5.3 mmol/L    Chloride 99 98 - 107 mmol/L    Carbon Dioxide (CO2) 35 (H) 22 - 29 mmol/L    Anion Gap 6 (L) 7 - 15 mmol/L    Urea Nitrogen 23.1 (H) 8.0 - 23.0 mg/dL    Creatinine 0.46 (L) 0.51 - 0.95 mg/dL    GFR Estimate >90 >60 mL/min/1.73m2    Calcium 8.3 (L) 8.8 - 10.4 mg/dL    Glucose 106 (H) 70 - 99 mg/dL   CBC with platelets     Status: Normal   Result Value Ref Range    WBC Count 9.5 4.0 - 11.0 10e3/uL    RBC Count 4.45 3.80 - 5.20 10e6/uL    Hemoglobin 13.5 11.7 - 15.7 g/dL    Hematocrit 42.5 35.0 - 47.0 %    MCV 96 78 - 100 fL    MCH 30.3 26.5 - 33.0 pg    MCHC 31.8 31.5 - 36.5 g/dL    RDW 14.2 10.0 - 15.0 %    Platelet Count 208 150 - 450 10e3/uL   EKG 12 lead     Status: None    Result Value Ref Range    Systolic Blood Pressure  mmHg    Diastolic Blood Pressure  mmHg    Ventricular Rate 102 BPM    Atrial Rate 102 BPM    ME Interval 194 ms    QRS Duration 118 ms     ms    QTc 456 ms    P Axis 41 degrees    R AXIS -13 degrees    T Axis -29 degrees    Interpretation ECG       Sinus tachycardia with Premature atrial complexes  Minimal voltage criteria for LVH, may be normal variant ( Jah product )  Inferior infarct (cited on or before 2024)  Abnormal ECG  When compared with ECG of 2024 16:11,  Premature atrial complexes are now Present  ST now depressed in Lateral leads  Nonspecific T wave abnormality now evident in Anterior leads  Unconfirmed report - interpretation of this ECG is computer generated - see medical record for final interpretation  Confirmed by - EMERGENCY ROOM, PHYSICIAN ([id number]),  BERNIE CAMPUZANO ([id number]) on 2025 6:35:01 AM     Echocardiogram Complete     Status: None   Result Value Ref Range    LVEF  40-45%     Narrative    [id number]  [id number]  [id number]  [id number]^ROB^ROHIT^     [facility]  Echocardiography Laboratory  [address] [place] [address]     Name: SENA MCGOVERN  MRN: [id number]  : 1945  Study Date: 2025 07:30 AM  Age: 80 yrs  Gender: Female  Patient [facility]: [facility]  Reason For Study: Heart Failure  Ordering Physician: ROHIT RIVAS  Referring Physician: Valerie Vincent  Performed By: Jeanne Lezama     BSA: 1.8 m2  Height: 62 in  Weight: 178 lb  HR: 72  BP: 148/90 mmHg  ______________________________________________________________________________  Procedure  Echocardiogram with two-dimensional, color and spectral Doppler. Optison (NDC  #2043-1455) given intravenously.  ______________________________________________________________________________  Interpretation Summary     Left ventricular systolic function is mild to moderately reduced.The visual  ejection fraction is 40-45%.Basal to mid  lateral wall hypokinesia, basal to  mid inferior wall hypokinesia, inferolateral wall hypokinesia  The right ventricular systolic function is mildly reduced.The right ventricle  is mildly dilated.  There is severe mitral annular calcification.There is mild mitral  stenosis.There is moderate (2+) mitral regurgitation.  There is moderate (2+) tricuspid regurgitation.  There is moderate (2+) aortic regurgitation-peak aortic valve velocity 3.5  m/s, mean gradients 30 mmHg, aortic valve area 1.5 cmÂ .Aortic regurgitation  Doppler pressure half-time is low at 230 ms-this could indicate worse AI then  visually estimated, alternatively this could be due to high LV filling  pressure (however E/E prime ratio is in the indeterminate range)  Pulmonary hypertension- RVSP 16 mm hg +RA.  IVC diameter >2.1 cm collapsing <50% with sniff suggests a high RA pressure  estimated at 15 mmHg or greater.     Compared to prior study dated 11/5/2024 no significant changes     ______________________________________________________________________________  Left Ventricle  Left ventricular systolic function is mild to moderately reduced. The visual  ejection fraction is 40-45%. Basal to mid lateral wall hypokinesia, basal to  mid inferior wall hypokinesia, inferolateral wall hypokinesia.     Right Ventricle  The right ventricle is mildly dilated. The right ventricular systolic function  is mildly reduced.     Atria  The left atrium is moderately dilated. The right atrium is mildly dilated.  There is no color Doppler evidence of an atrial shunt.     Mitral Valve  There is severe mitral annular calcification. There is moderate (2+) mitral  regurgitation. There is mild mitral stenosis. The mean mitral valve gradient  is 3.5 mmHg.     Tricuspid Valve  There is moderate (2+) tricuspid regurgitation. The right ventricular systolic  pressure is approximated at 46.0 mmHg plus the right atrial pressure.  Pulmonary hypertension.     Aortic Valve  The  aortic valve is trileaflet. There is moderate (2+) aortic regurgitation.  Aortic regurgitation Doppler pressure half-time is low at 230 ms-this could  indicate worse AI then visually estimated, alternatively this could be due to  high LV filling pressure (however E/E prime ratio is in the indeterminate  range). Moderate valvular aortic stenosis. Peak aortic valve velocity 3.5 m/s,  mean gradients 30 mmHg, aortic valve area 1.5 cmÂ .     Pulmonic Valve  There is mild (1+) pulmonic valvular regurgitation. There is no pulmonic  valvular stenosis.     Vessels  The aortic root is normal size. Normal size ascending aorta. IVC diameter >2.1  cm collapsing <50% with sniff suggests a high RA pressure estimated at 15 mmHg  or greater.     Pericardium  There is no pericardial effusion.     Rhythm  Sinus rhythm was noted.  ______________________________________________________________________________  MMode/2D Measurements & Calculations  IVC diam: 2.2 cm     Ao root diam: 3.6 cm  LVOT diam: 2.1 cm  LVOT area: 3.4 cm2  Ao root diam index Ht(cm/m): 2.3  Ao root diam index BSA (cm/m2): 2.0  LA Volume (BP): 38.7 ml  LA Volume Index (BP): 21.3 ml/m2  RV Base: 3.4 cm  TAPSE: 1.2 cm     Doppler Measurements & Calculations  MV E max garcia: 77.2 cm/sec  MV A max garcia: 143.7 cm/sec  MV E/A: 0.54  MV max P.8 mmHg  MV mean PG: 3.5 mmHg  MV V2 VTI: 36.4 cm  MVA(VTI): 3.2 cm2  MV dec slope: 509.7 cm/sec2  MV dec time: 0.15 sec  Ao V2 max: 343.1 cm/sec  Ao max P.2 mmHg  Ao V2 mean: 246.6 cm/sec  Ao mean P.7 mmHg  Ao V2 VTI: 76.4 cm  LIYA(I,D): 1.5 cm2  LIYA(V,D): 1.5 cm2  AI P1/2t: 218.0 msec  LV V1 max P.9 mmHg  LV V1 max: 157.2 cm/sec  LV V1 VTI: 34.8 cm  SV(LVOT): 116.7 ml  SI(LVOT): 64.1 ml/m2  PA V2 max: 115.5 cm/sec  PA max P.3 mmHg  PA acc time: 0.17 sec  PI end-d garcia: 187.7 cm/sec  TR max garcia: 322.8 cm/sec  TR max P.0 mmHg  AV Garcia Ratio (DI): 0.46  LIYA Index (cm2/m2): 0.84  E/E' avg: 10.6  Lateral E/e':  8.3  Medial E/e': 12.9  RV S Garcia: 9.5 cm/sec     ______________________________________________________________________________  Report approved by: Juve Glasgow MD on 06/25/2025 09:29 AM         CBC with differential     Status: Abnormal    Narrative    The following orders were created for panel order CBC with differential.  Procedure                               Abnormality         Status                     ---------                               -----------         ------                     CBC with platelets and ...[[id number]]  Abnormal            Final result                 Please view results for these tests on the individual orders.

## 2025-06-29 NOTE — PLAN OF CARE
"Goal Outcome Evaluation:      Plan of Care Reviewed With: patient        Problem: Adult Inpatient Plan of Care  Goal: Plan of Care Review  Description: The Plan of Care Review/Shift note should be completed every shift.  The Outcome Evaluation is a brief statement about your assessment that the patient is improving, declining, or no change.  This information will be displayed automatically on your shift  note.  Outcome: Unable to Meet  Flowsheets (Taken 6/28/2025 2249)  Outcome Evaluation: Pt confused, forgetful, on comfort care, on 3l oxygen, castro in place wityh 900cc output, for mobility use 2 assist, pivot to commod, plan to go home on hospice.  Plan of Care Reviewed With: patient  Goal: Patient-Specific Goal (Individualized)  Description: You can add care plan individualizations to a care plan. Examples of Individualization might be:  \"Parent requests to be called daily at 9am for status\", \"I have a hard time hearing out of my right ear\", or \"Do not touch me to wake me up as it startles  me\".  Outcome: Unable to Meet  Goal: Absence of Hospital-Acquired Illness or Injury  Outcome: Unable to Meet  Intervention: Identify and Manage Fall Risk  Recent Flowsheet Documentation  Taken 6/28/2025 2200 by Penny Leigh RN  Safety Promotion/Fall Prevention:   safety round/check completed   clutter free environment maintained   nonskid shoes/slippers when out of bed  Intervention: Prevent Skin Injury  Recent Flowsheet Documentation  Taken 6/28/2025 2200 by Penny Leigh RN  Body Position:   weight shifting   turned  Intervention: Prevent and Manage VTE (Venous Thromboembolism) Risk  Recent Flowsheet Documentation  Taken 6/28/2025 2200 by Penny Leigh RN  VTE Prevention/Management: SCDs off (sequential compression devices)  Intervention: Prevent Infection  Recent Flowsheet Documentation  Taken 6/28/2025 2200 by Penny Leigh RN  Infection Prevention:   hand hygiene promoted   rest/sleep promoted   " single patient room provided  Goal: Optimal Comfort and Wellbeing  Outcome: Unable to Meet  Intervention: Monitor Pain and Promote Comfort  Recent Flowsheet Documentation  Taken 6/28/2025 2200 by Penny Leigh RN  Pain Management Interventions:   food   guided imagery   repositioned   rest  Intervention: Provide Person-Centered Care  Recent Flowsheet Documentation  Taken 6/28/2025 2200 by Penny Leigh RN  Trust Relationship/Rapport:   care explained   choices provided   emotional support provided   empathic listening provided   questions answered   reassurance provided   thoughts/feelings acknowledged  Goal: Readiness for Transition of Care  Outcome: Unable to Meet     Problem: Palliative Care  Goal: Enhanced Quality of Life  Outcome: Unable to Meet  Intervention: Maximize Comfort  Recent Flowsheet Documentation  Taken 6/28/2025 2200 by Penny Leigh RN  Pain Management Interventions:   food   guided imagery   repositioned   rest  Oral Care: mouth wash rinse       Outcome Evaluation: Pt confused, forgetful, on comfort care, on 3l oxygen, castro in place wityh 900cc output, for mobility use 2 assist, pivot to commod, plan to go home on hospice.

## 2025-06-29 NOTE — PLAN OF CARE
"Comfort cares. Remains on 3L 02. Diminished LS. PRETTY. Confused. Alert to self only. Pain medication given for back pain.     Goal Outcome Evaluation:      Plan of Care Reviewed With: patient    Overall Patient Progress: no changeOverall Patient Progress: no change    Outcome Evaluation: Comfort cares      Problem: Adult Inpatient Plan of Care  Goal: Plan of Care Review  Description: The Plan of Care Review/Shift note should be completed every shift.  The Outcome Evaluation is a brief statement about your assessment that the patient is improving, declining, or no change.  This information will be displayed automatically on your shift  note.  Outcome: Progressing  Flowsheets (Taken 6/29/2025 0332)  Outcome Evaluation: Comfort cares  Plan of Care Reviewed With: patient  Overall Patient Progress: no change  Goal: Patient-Specific Goal (Individualized)  Description: You can add care plan individualizations to a care plan. Examples of Individualization might be:  \"Parent requests to be called daily at 9am for status\", \"I have a hard time hearing out of my right ear\", or \"Do not touch me to wake me up as it startles  me\".  Outcome: Progressing  Goal: Absence of Hospital-Acquired Illness or Injury  Outcome: Progressing  Intervention: Identify and Manage Fall Risk  Recent Flowsheet Documentation  Taken 6/29/2025 0019 by Lora Krueger RN  Safety Promotion/Fall Prevention:   lighting adjusted   nonskid shoes/slippers when out of bed  Intervention: Prevent Skin Injury  Recent Flowsheet Documentation  Taken 6/29/2025 0019 by Lora Krueger RN  Body Position: position changed independently  Intervention: Prevent and Manage VTE (Venous Thromboembolism) Risk  Recent Flowsheet Documentation  Taken 6/29/2025 0019 by Lora Krueger RN  VTE Prevention/Management: SCDs off (sequential compression devices)  Intervention: Prevent Infection  Recent Flowsheet Documentation  Taken 6/29/2025 0019 by Lora Krueger RN  Infection " Prevention:   hand hygiene promoted   rest/sleep promoted   single patient room provided  Goal: Optimal Comfort and Wellbeing  Outcome: Progressing  Intervention: Monitor Pain and Promote Comfort  Recent Flowsheet Documentation  Taken 6/29/2025 0239 by Lora Krueger, RN  Pain Management Interventions: medication (see MAR)  Taken 6/28/2025 2348 by Lora Krueger, RN  Pain Management Interventions: declines  Intervention: Provide Person-Centered Care  Recent Flowsheet Documentation  Taken 6/29/2025 0019 by Lora Krueger, RN  Trust Relationship/Rapport:   care explained   choices provided   questions answered   reassurance provided  Goal: Readiness for Transition of Care  Outcome: Progressing

## 2025-06-29 NOTE — PROVIDER NOTIFICATION
06/28/25 2004   RCAT Assessment   Reason for Assessment CHF   Pulmonary Status 3   Surgical Status 0   Chest X-ray 3   Respiratory Pattern 3   Mental Status 0   Breath Sounds 2   Cough Effectiveness 0   Level of Activity 1   O2 Required for SpO2>=92% 1   Acuity Level (points) 13   Acuity Level  3   Re-eval Interval Guideline Every 3 days   Re-evaluation Date 07/01/25   $RT Consult Time Spent RCAT (30 minute increments) 1   Clinical Indications/Symptoms   Aerosol Therapy RCAT protocol   Broncho-pulmonary Hygiene Productive cough   Volume Expansion Prevent atelectasis   Aerosol Therapy Plan   RT Treatment Nebulizer   Anticholinergic/Beta-Andrenergic Agonist Duoneb soln (0.5mg/3mg per 3mL) neb Max 6 doses/24h   Aerosol Treatment Frequency Acuity Level 3: QID/PRN @noc-Mod wheezing/Hx asthma/secretion removal   Broncho-Pulmonary Hygiene Plan   Broncho-Pulmonary Hygiene Treatment Coughing techniques   Broncho-Pulm Hygiene Frequency Acuity Level 3: TID-Small amounts secretions/poor cough, hx of secretions   Volume Expansion Plan   Volume Expansion Treatment Incentive Spirometer   Volume Expansion Frequency Acuity Level 3: TID-At risk for developing atelectasis     Nora Lezama, RT

## 2025-06-30 PROCEDURE — 250N000013 HC RX MED GY IP 250 OP 250 PS 637: Performed by: STUDENT IN AN ORGANIZED HEALTH CARE EDUCATION/TRAINING PROGRAM

## 2025-06-30 PROCEDURE — 94640 AIRWAY INHALATION TREATMENT: CPT | Mod: 76

## 2025-06-30 PROCEDURE — 99232 SBSQ HOSP IP/OBS MODERATE 35: CPT | Performed by: INTERNAL MEDICINE

## 2025-06-30 PROCEDURE — 250N000009 HC RX 250: Performed by: INTERNAL MEDICINE

## 2025-06-30 PROCEDURE — 94640 AIRWAY INHALATION TREATMENT: CPT

## 2025-06-30 PROCEDURE — 999N000157 HC STATISTIC RCP TIME EA 10 MIN

## 2025-06-30 PROCEDURE — 99232 SBSQ HOSP IP/OBS MODERATE 35: CPT | Performed by: CLINICAL NURSE SPECIALIST

## 2025-06-30 PROCEDURE — 120N000001 HC R&B MED SURG/OB

## 2025-06-30 RX ORDER — VITAMIN B COMPLEX
2000 TABLET ORAL DAILY
Status: DISCONTINUED | OUTPATIENT
Start: 2025-06-30 | End: 2025-06-30

## 2025-06-30 RX ADMIN — IPRATROPIUM BROMIDE AND ALBUTEROL SULFATE 3 ML: .5; 3 SOLUTION RESPIRATORY (INHALATION) at 11:42

## 2025-06-30 RX ADMIN — IPRATROPIUM BROMIDE AND ALBUTEROL SULFATE 3 ML: .5; 3 SOLUTION RESPIRATORY (INHALATION) at 07:27

## 2025-06-30 RX ADMIN — ASPIRIN 81 MG: 81 TABLET, DELAYED RELEASE ORAL at 08:45

## 2025-06-30 RX ADMIN — FLUTICASONE FUROATE AND VILANTEROL TRIFENATATE 1 PUFF: 100; 25 POWDER RESPIRATORY (INHALATION) at 07:27

## 2025-06-30 RX ADMIN — IPRATROPIUM BROMIDE AND ALBUTEROL SULFATE 3 ML: .5; 3 SOLUTION RESPIRATORY (INHALATION) at 16:13

## 2025-06-30 RX ADMIN — DORZOLAMIDE HYDROCHLORIDE AND TIMOLOL MALEATE 1 DROP: 20; 5 SOLUTION OPHTHALMIC at 08:46

## 2025-06-30 RX ADMIN — IPRATROPIUM BROMIDE AND ALBUTEROL SULFATE 3 ML: .5; 3 SOLUTION RESPIRATORY (INHALATION) at 20:07

## 2025-06-30 RX ADMIN — BRIMONIDINE TARTRATE 1 DROP: 2 SOLUTION/ DROPS OPHTHALMIC at 08:46

## 2025-06-30 ASSESSMENT — ACTIVITIES OF DAILY LIVING (ADL)
ADLS_ACUITY_SCORE: 49
ADLS_ACUITY_SCORE: 47
ADLS_ACUITY_SCORE: 49
ADLS_ACUITY_SCORE: 47
ADLS_ACUITY_SCORE: 49
ADLS_ACUITY_SCORE: 47
ADLS_ACUITY_SCORE: 49
ADLS_ACUITY_SCORE: 47
ADLS_ACUITY_SCORE: 49
ADLS_ACUITY_SCORE: 47
ADLS_ACUITY_SCORE: 47

## 2025-06-30 NOTE — PROGRESS NOTES
Care Management Follow Up    Length of Stay (days): 6    Expected Discharge Date: 07/01/2025     Concerns to be Addressed: discharge planning     Patient plan of care discussed at interdisciplinary rounds: Yes    Anticipated Discharge Disposition:                Anticipated Discharge Services:    Anticipated Discharge DME:      Patient/family educated on Medicare website which has current facility and service quality ratings:    Education Provided on the Discharge Plan:    Patient/Family in Agreement with the Plan: yes    Referrals Placed by CM/SW:    Private pay costs discussed: Not applicable    Discussed  Partnership in Safe Discharge Planning  document with patient/family: No     Handoff Completed: No, handoff not indicated or clinically appropriate    Additional Information:  [facility] spoke with Lori Abdul, for an update on process of pt moving into facility. Lori to be coming to complete an in person assessment of pt today.     Next Steps: CHAZ to follow for discharge.    ADDENDUM 1:30pm  [facility] spoke with pt's niece, Chari, who shared that the assessment with [facility] went well and she was waiting to receive the paperwork needed from [facility]. Chari will reach out with information on move in to [facility].     CHAZ additionally spoke with Park from [facility] who stated that they are able to admit pt when she is moved into [facility].    Sheila Day, DARIO, LGSW  [facility] Care Coordinator/ Med Surg [address] [place] [address]  [phone]

## 2025-06-30 NOTE — PROGRESS NOTES
[facility]    Medicine Progress Note - Hospitalist Service    Date of Admission:  6/24/2025    Assessment & Plan   Berenice Krause is a 80 year old female admitted on 6/24/2025 lower extremity swelling and SOB due thought due to exacerbation of HFmrEF. He was also found to have an apparent large hematoma over the dorsum of the right foot.     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     In the emergency department the patient was afebrile. Her heart rate was . Blood pressures 117-154/. She was requiring 3L via nasal cannula to maintain oxygen saturations >92%.    Laboratory studies were notable for bicarb 31, BUN 30.7, creatinine 0.67, glucose 188, NT proBNP 13,371, troponin less than 6, WBC 12.2.    ECG: sinus tachycardia, but without ST segment dynamic changes.  She did have some PACs as well as T wave inversions in leads III, V1, and V2; morphology is similar to prior ECG obtained 11/2024.    Chest x-ray: interstitial prominence and hazy interstitial opacities.   ED interventions: Lasix 40 mg IV and was admitted for further cares of suspected heart failure exacerbation.     Ms. Krause was admitted to a medical bed on telemetry after having received a dose of Furosemide 40 mg IV in the ED. She got a second dose before it was noted that her creatinine was rising and her BP was falling. With these findings, Lisinopril was reduced to 2.5 mg daily with parameters to hold and the lasix was stopped.     Her Echo is not significantly different than the one completed in 11/2024, but note that she has both reduced EF and multiple moderate valvular insufficiencies.     The pt is quite limited in function. She is able to walk several feet at baseline, but with a walker and assist. Family has recognized that she has developed significant memory  "deficits. She is unable to answer many questions about her preferences for longer term goals of care. She seems to avoid answering difficult or complex questions. Her brother, sister-in-law and niece are close and involved family members.  They are willing to be decision makers for her, but they are not \"designated\" POA.     Pt has continued pleasantly confused throughout the hospital stay.     DX:  Hypoxic resp failure and peripheral edema in the setting of known HFmrEF, moderate AORTIC STENOSIS, Severe Emphysema (not previously oxygen-dependent) and severe Thoracic Kyphosis. NT-BNP 13K and TTE from 11/2024 40-45%. Echo obtained hospital stay unchanged from previous. Hypoxia may be related to CHF v poss infiltrates.  Severe emphysema based on CT scan.   Right foot bruising was initially of some concern, but the pulse is brisk and the edema has resolved.   HARJIT due to intolerance to diuresis. This bodes poorly for CHF management. Baseline creat suggests slightly better than GFR based on Cystatin C.  Mild hyperglycemia in the setting of pre-diabetes NIDDM. HbA1c 6.0.   Leukocytosis with left shift. Resolved. CRP 11 and Procalcitonin 0.12 - not consistent with significant infection. No empiric abx.  HTN.  Pt became hypotensive and BP meds have been stopped.   Hx Central retinal artery occlusion, left eye. On ASA chronically.  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  Cognitive impairment is suggested in Dr. Ennis' admission note.  I also have observed that the pt is oriented to self only.  She can identify family members also. And although she often appears appropriate, she evades questions that are difficult and often is frankly confused. She has known diffuse changes on MRI of the Brain \"typically associated with chronic hypertension or amyloid angiopathy\".   Severe malnutrition, suspect that this is related to undiagnosed aspiration.  She seems to cough with every swallow and has a wet cough with eating. This is " likely made worse by the severe Kyphosis.  Goals of care. Discussed with family that the pt is likely at least, to need to return to the hospital relatively frequently due to her many combined issues. They have decided to pursue comfort care.   PLAN:  Cont to hold diuretics.  Stopped Lisinopril due to worsening HARJIT along with hypotension.  Other usual home meds were initially resumed. Subsequently, non-comfort medications (other than aspirin) have been stopped.  Briefly discussed with the pt the cardiologist's (Dr. Wong) assessment. Palliative care was consulted and ultimately, a Hospice plan for dispo was planned.   Anticipate discharge to SNF with plan to enter Hospice.     Diet: Regular Diet Adult    DVT Prophylaxis: Enoxaparin (Lovenox) SQ  Herbert Catheter: PRESENT, indication: Acute retention or obstruction, End of life  Lines: None     Cardiac Monitoring: None  Code Status: No CPR- Do NOT Intubate      Clinically Significant Risk Factors               # Hypoalbuminemia: Lowest albumin = 3.1 g/dL at 6/25/2025  7:21 AM, will monitor as appropriate     # Hypertension: Noted on problem list    # Heart failure, NOS: heart failure noted on the problem list and last echo with EF 40-50%               # Financial/Environmental Concerns:           Social Drivers of Health    Tobacco Use: Medium Risk (11/19/2024)    [facility]    Patient History     Smoking Tobacco Use: Former     Smokeless Tobacco Use: Never          Disposition Plan     Medically Ready for Discharge: Ready now.      Juan Solis MD  Hospitalist Service  [facility]  Securely message with AWAK (more info)  Text page via [employer] Paging/Directory   ______________________________________________________________________    Interval History   Stable night per nursing documentation.   Pt pleasantly confused this am. NAD.     Physical Exam   Vital Signs:       Pulse: 96   Resp: 18 SpO2: 94 %  O2 Device: Nasal cannula Oxygen Delivery: 3 LPM  Weight: 77 lbs 8 oz    General Appearance: Very thin, frail, elderly woman in NAD. Calm in bed on O2 at 2 LPM by NC.  Severe kyphosis noted. Marked decrease in muscular bulk and subcutaneous fat.   Respiratory: No increased WOB. Distant breath sounds throughout. No obvious rales or wheeze.  Severe kyphoscoliosis.  Cardiovascular: RRR with soft systolic Aortic as well as Tricuspid area murmurs.   GI: soft, NTND  Skin: the right foot is bruised-appearing with purple discoloration. Dorsalis pedis pulse easily palpated on the right foot at this time. Edema has resolved.  Trace edema otherwise. Decreased muscular bulk noted over the upper mmore so than the lower extremities.     Medical Decision Making       25 MINUTES SPENT BY ME on the date of service doing chart review, history, exam, documentation & further activities per the note.    No results found for this or any previous visit (from the past 24 hours).

## 2025-06-30 NOTE — PLAN OF CARE
Patient presents alert to self in room, very sweet and cooperative. Herbert patent and draining dark red bloody urine. Patient denies pain. Repositioned in bed. No BM this shift. Slept well for most of the night. Continues with comfort cares.     Goal Outcome Evaluation:      Plan of Care Reviewed With: patient    Overall Patient Progress: no changeOverall Patient Progress: no change    Outcome Evaluation: Comfort cares, linens changes, dressing to right shin changed      Problem: Adult Inpatient Plan of Care  Goal: Plan of Care Review  Description: The Plan of Care Review/Shift note should be completed every shift.  The Outcome Evaluation is a brief statement about your assessment that the patient is improving, declining, or no change.  This information will be displayed automatically on your shift  note.  Recent Flowsheet Documentation  Taken 6/30/2025 0553 by Kay Lugo RN  Outcome Evaluation: Comfort cares, linens changes, dressing to right shin changed  Plan of Care Reviewed With: patient  Overall Patient Progress: no change  Goal: Absence of Hospital-Acquired Illness or Injury  Intervention: Identify and Manage Fall Risk  Recent Flowsheet Documentation  Taken 6/30/2025 0200 by Kay Lugo RN  Safety Promotion/Fall Prevention: safety round/check completed  Taken 6/29/2025 2051 by Kay Lugo RN  Safety Promotion/Fall Prevention: safety round/check completed  Intervention: Prevent Skin Injury  Recent Flowsheet Documentation  Taken 6/29/2025 2051 by Kay Lugo RN  Body Position:   heels elevated   legs elevated   foot of bed elevated   supine, legs elevated   supine, head elevated  Intervention: Prevent and Manage VTE (Venous Thromboembolism) Risk  Recent Flowsheet Documentation  Taken 6/30/2025 0200 by Kay Lugo RN  VTE Prevention/Management: SCDs off (sequential compression devices)  Taken 6/29/2025 2051 by Kay Lugo RN  VTE Prevention/Management:  SCDs off (sequential compression devices)  Intervention: Prevent Infection  Recent Flowsheet Documentation  Taken 6/30/2025 0200 by Kay Lugo RN  Infection Prevention:   hand hygiene promoted   rest/sleep promoted   single patient room provided  Taken 6/29/2025 2051 by Kay Lugo RN  Infection Prevention:   hand hygiene promoted   rest/sleep promoted   single patient room provided  Goal: Optimal Comfort and Wellbeing  Intervention: Provide Person-Centered Care  Recent Flowsheet Documentation  Taken 6/30/2025 0200 by Kay Lugo RN  Trust Relationship/Rapport:   care explained   choices provided   questions answered   reassurance provided  Taken 6/29/2025 2051 by Kay Lugo RN  Trust Relationship/Rapport:   care explained   choices provided   questions answered   reassurance provided

## 2025-06-30 NOTE — PROGRESS NOTES
PALLIATIVE CARE PROGRESS NOTE  [facility]     Patient Name: Berenice Krause  Date of Admission: 6/24/2025   Today the patient was seen for: End of life cares     Recommendations & Counseling     GOALS OF CARE:   Comfort-focused treatment - plan to dismiss to [facility] with the support of [facility] tomorrow.    Family aware of the risk of aspiration and agree with plan for comfort-focused eating.   Appreciate Social Work arranging tomorrow's dismissal plan.   Maintain castro catheter for hospice dismissal.      ADVANCE CARE PLANNING:  Advance Directive dated 5/21/2008 has been sent to [employer] to be verified.  The document does not name a Health Care agent.   New POLST form dated 6/27/2025 can be found in [facility] docs.   Code status: No CPR- Do NOT Intubate     MEDICAL MANAGEMENT:   Comfort Care   Below are common symptoms routinely seen in patients with comfort focused goals and at the end of life. This patient may not currently exhibit all of these symptoms; however, if these were to occur our recommendations are as follows:     #Pain and #Air hunger/Dyspnea   Given CRF (Creatine Clearance 37.2 mL/min) would avoid the use of Morphine  Roxicodone PRN for pain, dyspnea or respiratory rate greater than 20.     #Anxiety    Lorazepam PO/SL 1 mg  q 3 hour as needed.      #Secretion burden   Robinul 0.2 mg (PO/IV) q 4 hours as needed. If ineffective, increase up to 0.3 mg   Consider atropine if Robinul ineffective after dose increase      #Nausea   Zofran 4 mg q 6 hours as needed. Can increase to 8 mg   Zyprexa 5 mg q 6 hours as needed      #Agitation  Aggressive control of other symptoms first, then  1st choice: Zyprexa 5 mg ODT or IM   2nd choice: Increase dose of Zyprexa to 10 mg or consider switch to Haldol   3rd choice: Lorazepam as above      PSYCHOSOCIAL/SPIRITUAL SUPPORT:  Family : Brother Giorgio, sister in law Jamli-Jamil and their daughter Penny Ng is not  and did not  have children.  Has a sister with whom she is estranged.    Berenice lived next door to [employer] prior to her move to [place] 7 years ago.  Giorgio states she sold her house to [place]andra [place]. States they are very close and they look after her and assist her with finances.   Susanne community: [place]   Appreciate input of ANNABELLE Earl.    Palliative Care will continue to follow. Thank you for the consult and allowing us to aid in the care of Berenice Krause.    These recommendations have been discussed with Hospitalist mirela Solis MD. Bedside nurse YARELY Dos Santos and  YUNIER Sosa.    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  She presented on 6/24 with shortness of breath and BLE edema, thought to be due to HF exacerbation.  Admitted for further work up and medical management.     Patient on diuretics at baseline, labs indicated elevated Cr question diuretic intolerance.  Cardiology consulted.  Per Hospitalist conversation, patient has not shown interest in further interventions.  Patient has become more confused throughout hospitalization with significant change on 6/26. Family requested to pursue a comfort-focused plan of care 6/27.      Interval History:     Multidisciplinary collaboration:  Appreciate nursing diligence in providing Berenice comfort.    Patient/family narrative  Met Berenice as she was resting in bed watching TV. No visitors at bedside. Berenice asked if I was working today, which I acknowledged my role in providing her comfort. She offered no complaint and I admit she looks improved, more alert from  when I met her on Friday.      Review of Systems:     Besides above, ROS was reviewed and is unremarkable        Physical Exam:   Pulse:  [81-96] 96  Resp:  [16-20] 18  SpO2:  [94 %-95 %] 94 %  77 lbs 8 oz    Physical Exam  GEN:  Cachetic, frail female with severe kyphosis. Alert, oriented to self only, appears comfortable, no apparent distress.  HEENT:  Normocephalic/atraumatic, no scleral icterus, no nasal discharge, mouth moist.  RESP:  Symmetric chest rise on inhalation noted.  Normal respiratory effort.  PAIN BEHAVIOR: Cooperative  Psych:  Normal affect.  Calm, cooperative, conversant appropriately.      Data Reviewed:     No results found for this or any previous visit (from the past 24 hours).      MANAGEMENT DISCUSSED with the following over the past 24 hours: Hospitalist Juan Solis MD; bedside nurses YARELY Dos Santos and YARELY Lindsay and  YUNIER Sosa.   30 MINUTES SPENT BY ME on the date of service doing chart review, history, exam, documentation & further activities per the note.

## 2025-06-30 NOTE — PLAN OF CARE
Comfort cares. [facility] here today to assess placement.  Denies pain. Incont stool. Herbert in place for retention.         Goal Outcome Evaluation:      Plan of Care Reviewed With: patient    Overall Patient Progress: no changeOverall Patient Progress: no change    Outcome Evaluation: Comfort cares. [facility] here today to assess placement. Denies pain.          Problem: Adult Inpatient Plan of Care  Goal: Plan of Care Review  Description: The Plan of Care Review/Shift note should be completed every shift.  The Outcome Evaluation is a brief statement about your assessment that the patient is improving, declining, or no change.  This information will be displayed automatically on your shift  note.  Recent Flowsheet Documentation  Taken 6/30/2025 1503 by Raya Davila, RN  Outcome Evaluation: Comfort cares. [facility] here today to assess placement. Denies pain.  Plan of Care Reviewed With: patient  Overall Patient Progress: no change  Goal: Optimal Comfort and Wellbeing  Intervention: Provide Person-Centered Care  Recent Flowsheet Documentation  Taken 6/30/2025 0845 by Raya Davila RN  Trust Relationship/Rapport: care explained

## 2025-07-01 ENCOUNTER — DOCUMENTATION ONLY (OUTPATIENT)
Dept: OTHER | Facility: CLINIC | Age: 80
End: 2025-07-01
Payer: MEDICARE

## 2025-07-01 PROCEDURE — 99232 SBSQ HOSP IP/OBS MODERATE 35: CPT | Performed by: STUDENT IN AN ORGANIZED HEALTH CARE EDUCATION/TRAINING PROGRAM

## 2025-07-01 PROCEDURE — 999N000156 HC STATISTIC RCP CONSULT EA 30 MIN

## 2025-07-01 PROCEDURE — 99232 SBSQ HOSP IP/OBS MODERATE 35: CPT | Performed by: CLINICAL NURSE SPECIALIST

## 2025-07-01 PROCEDURE — 999N000157 HC STATISTIC RCP TIME EA 10 MIN

## 2025-07-01 PROCEDURE — 120N000001 HC R&B MED SURG/OB

## 2025-07-01 PROCEDURE — 94640 AIRWAY INHALATION TREATMENT: CPT | Mod: 76

## 2025-07-01 PROCEDURE — 250N000009 HC RX 250: Performed by: INTERNAL MEDICINE

## 2025-07-01 PROCEDURE — 250N000013 HC RX MED GY IP 250 OP 250 PS 637: Performed by: STUDENT IN AN ORGANIZED HEALTH CARE EDUCATION/TRAINING PROGRAM

## 2025-07-01 PROCEDURE — 250N000013 HC RX MED GY IP 250 OP 250 PS 637: Performed by: INTERNAL MEDICINE

## 2025-07-01 PROCEDURE — 94640 AIRWAY INHALATION TREATMENT: CPT

## 2025-07-01 PROCEDURE — 250N000013 HC RX MED GY IP 250 OP 250 PS 637: Performed by: CLINICAL NURSE SPECIALIST

## 2025-07-01 RX ORDER — IPRATROPIUM BROMIDE AND ALBUTEROL SULFATE 2.5; .5 MG/3ML; MG/3ML
3 SOLUTION RESPIRATORY (INHALATION) EVERY 4 HOURS PRN
Status: DISCONTINUED | OUTPATIENT
Start: 2025-07-01 | End: 2025-07-03 | Stop reason: HOSPADM

## 2025-07-01 RX ADMIN — BRIMONIDINE TARTRATE 1 DROP: 2 SOLUTION/ DROPS OPHTHALMIC at 09:32

## 2025-07-01 RX ADMIN — IPRATROPIUM BROMIDE AND ALBUTEROL SULFATE 3 ML: .5; 3 SOLUTION RESPIRATORY (INHALATION) at 11:46

## 2025-07-01 RX ADMIN — FLUTICASONE FUROATE AND VILANTEROL TRIFENATATE 1 PUFF: 100; 25 POWDER RESPIRATORY (INHALATION) at 07:37

## 2025-07-01 RX ADMIN — BRIMONIDINE TARTRATE 1 DROP: 2 SOLUTION/ DROPS OPHTHALMIC at 21:05

## 2025-07-01 RX ADMIN — OXYCODONE HYDROCHLORIDE 2.5 MG: 100 SOLUTION ORAL at 04:30

## 2025-07-01 RX ADMIN — DORZOLAMIDE HYDROCHLORIDE AND TIMOLOL MALEATE 1 DROP: 20; 5 SOLUTION OPHTHALMIC at 09:32

## 2025-07-01 RX ADMIN — IPRATROPIUM BROMIDE AND ALBUTEROL SULFATE 3 ML: .5; 3 SOLUTION RESPIRATORY (INHALATION) at 07:37

## 2025-07-01 RX ADMIN — IPRATROPIUM BROMIDE AND ALBUTEROL SULFATE 3 ML: .5; 3 SOLUTION RESPIRATORY (INHALATION) at 16:12

## 2025-07-01 RX ADMIN — ASPIRIN 81 MG: 81 TABLET, DELAYED RELEASE ORAL at 09:32

## 2025-07-01 RX ADMIN — ACETAMINOPHEN 1000 MG: 500 TABLET, FILM COATED ORAL at 03:34

## 2025-07-01 ASSESSMENT — ACTIVITIES OF DAILY LIVING (ADL)
ADLS_ACUITY_SCORE: 49
ADLS_ACUITY_SCORE: 53
ADLS_ACUITY_SCORE: 53
ADLS_ACUITY_SCORE: 49
ADLS_ACUITY_SCORE: 53
ADLS_ACUITY_SCORE: 49
ADLS_ACUITY_SCORE: 53
ADLS_ACUITY_SCORE: 53
ADLS_ACUITY_SCORE: 49
ADLS_ACUITY_SCORE: 53
ADLS_ACUITY_SCORE: 53
ADLS_ACUITY_SCORE: 49
ADLS_ACUITY_SCORE: 53
ADLS_ACUITY_SCORE: 49
ADLS_ACUITY_SCORE: 53

## 2025-07-01 NOTE — PROGRESS NOTES
[facility]    Medicine Progress Note - Hospitalist Service    Date of Admission:  6/24/2025    Assessment & Plan   Berenice Krause is a 80 year old female admitted on 6/24/2025 lower extremity swelling and SOB due thought due to exacerbation of HFmrEF. He was also found to have an apparent large hematoma over the dorsum of the right foot.     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     In the emergency department the patient was afebrile. Her heart rate was . Blood pressures 117-154/. She was requiring 3L via nasal cannula to maintain oxygen saturations >92%.    Laboratory studies were notable for bicarb 31, BUN 30.7, creatinine 0.67, glucose 188, NT proBNP 13,371, troponin less than 6, WBC 12.2.    ECG: sinus tachycardia, but without ST segment dynamic changes.  She did have some PACs as well as T wave inversions in leads III, V1, and V2; morphology is similar to prior ECG obtained 11/2024.    Chest x-ray: interstitial prominence and hazy interstitial opacities.   ED interventions: Lasix 40 mg IV and was admitted for further cares of suspected heart failure exacerbation.     Ms. Krause was admitted to a medical bed on telemetry after having received a dose of Furosemide 40 mg IV in the ED. She got a second dose before it was noted that her creatinine was rising and her BP was falling. With these findings, Lisinopril was reduced to 2.5 mg daily with parameters to hold and the lasix was stopped.     Her Echo is not significantly different than the one completed in 11/2024, but note that she has both reduced EF and multiple moderate valvular insufficiencies.     The pt is quite limited in function. She is able to walk several feet at baseline, but with a walker and assist. Family has recognized that she has developed significant memory  "deficits. She is unable to answer many questions about her preferences for longer term goals of care. She seems to avoid answering difficult or complex questions. Her brother, sister-in-law and niece are close and involved family members.  They are willing to be decision makers for her, but they are not \"designated\" POA.     Pt has continued pleasantly confused throughout the hospital stay.     DX:  Hypoxic resp failure and peripheral edema in the setting of known HFmrEF, moderate AORTIC STENOSIS, Severe Emphysema (not previously oxygen-dependent) and severe Thoracic Kyphosis. NT-BNP 13K and TTE from 11/2024 40-45%. Echo obtained hospital stay unchanged from previous. Hypoxia may be related to CHF v poss infiltrates.  Severe emphysema based on CT scan.   Right foot bruising was initially of some concern, but the pulse is brisk and the edema has resolved.   HARJIT due to intolerance to diuresis. This bodes poorly for CHF management. Baseline creat suggests slightly better than GFR based on Cystatin C.  Mild hyperglycemia in the setting of pre-diabetes NIDDM. HbA1c 6.0.   Leukocytosis with left shift. Resolved. CRP 11 and Procalcitonin 0.12 - not consistent with significant infection. No empiric abx.  HTN.  Pt became hypotensive and BP meds have been stopped.   Hx Central retinal artery occlusion, left eye. On ASA chronically.  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  Cognitive impairment is suggested in Dr. Ennis' admission note.  I also have observed that the pt is oriented to self only.  She can identify family members also. And although she often appears appropriate, she evades questions that are difficult and often is frankly confused. She has known diffuse changes on MRI of the Brain \"typically associated with chronic hypertension or amyloid angiopathy\".   Severe malnutrition, suspect that this is related to undiagnosed aspiration.  She seems to cough with every swallow and has a wet cough with eating. This is " likely made worse by the severe Kyphosis.  Goals of care. Discussed with family that the pt is likely at least, to need to return to the hospital relatively frequently due to her many combined issues. They have decided to pursue comfort care.   PLAN:  Cont to hold diuretics.  Stopped Lisinopril due to worsening HARJIT along with hypotension.  Other usual home meds were initially resumed. Subsequently, non-comfort medications (other than aspirin) have been stopped.  Briefly discussed with the pt the cardiologist's (Dr. Wong) assessment. Palliative care was consulted and ultimately, a Hospice plan for dispo was planned.   Anticipate discharge to SNF with plan to enter Hospice.     Diet: Regular Diet Adult    DVT Prophylaxis: Enoxaparin (Lovenox) SQ  Herbert Catheter: PRESENT, indication: End of life  Lines: None     Cardiac Monitoring: None  Code Status: No CPR- Do NOT Intubate      Clinically Significant Risk Factors               # Hypoalbuminemia: Lowest albumin = 3.1 g/dL at 6/25/2025  7:21 AM, will monitor as appropriate     # Hypertension: Noted on problem list    # Heart failure, NOS: heart failure noted on the problem list and last echo with EF 40-50%               # Financial/Environmental Concerns:           Social Drivers of Health    Tobacco Use: Medium Risk (11/19/2024)    [facility]    Patient History     Smoking Tobacco Use: Former     Smokeless Tobacco Use: Never          Disposition Plan     Medically Ready for Discharge: Ready now.      Akil Mcfarland MD  Hospitalist Service  [facility]  Securely message with [employer] (more info)  Text page via [email or web address] Paging/Directory   ______________________________________________________________________    Interval History   Pleasantly confused.  When I advised her that she will go to hospice, she objected and said she wants to go to [place].    Physical Exam   Vital Signs:           Resp: 24 SpO2: 95 % O2  Device: Nasal cannula Oxygen Delivery: 3 LPM  Weight: 77 lbs 8 oz    General Appearance: Very thin, frail, elderly woman in NAD. Calm in bed on O2 at 2 LPM by NC.  Severe kyphosis noted. Marked decrease in muscular bulk and subcutaneous fat.   Respiratory: No increased WOB. Distant breath sounds throughout. No obvious rales or wheeze.  Severe kyphoscoliosis.  Cardiovascular: RRR with soft systolic Aortic as well as Tricuspid area murmurs.   GI: soft, NTND  Skin: the right foot is bruised-appearing with purple discoloration. Dorsalis pedis pulse easily palpated on the right foot at this time. Edema has resolved.  Trace edema otherwise. Decreased muscular bulk noted over the upper mmore so than the lower extremities.     Medical Decision Making       25 MINUTES SPENT BY ME on the date of service doing chart review, history, exam, documentation & further activities per the note.    No results found for this or any previous visit (from the past 24 hours).

## 2025-07-01 NOTE — PROVIDER NOTIFICATION
07/01/25 1612   RCAT Assessment   Reason for Assessment CHF   Pulmonary Status 4   Surgical Status 0   Chest X-ray 0  (No new imaging since 6/26)   Respiratory Pattern 2   Mental Status 0   Breath Sounds 2   Cough Effectiveness 0   Level of Activity 1   O2 Required for SpO2>=92% 1   Acuity Level (points) 10   Acuity Level  4   Re-eval Interval Guideline Every 3 days   Re-evaluation Date 07/04/25   $RT Consult Time Spent RCAT (30 minute increments) 1   Clinical Indications/Symptoms   Aerosol Therapy RCAT protocol;Physician order;History of bronchospasm   Broncho-pulmonary Hygiene History of mucous producing disease   Volume Expansion Decreased breath sounds   Aerosol Therapy Plan   RT Treatment Nebulizer   Anticholinergic/Beta-Andrenergic Agonist Duoneb soln (0.5mg/3mg per 3mL) neb Max 6 doses/24h   Aerosol Treatment Frequency Acuity Level 4: PRN [id number] wheezing   Aerosol Therapy (SVN)   Patient Position HOB elevated   Broncho-Pulmonary Hygiene Plan   Broncho-Pulmonary Hygiene Treatment Coughing techniques   Broncho-Pulm Hygiene Frequency Acuity Level 4: BID-Unable to deep breathe & cough spontaneously   Volume Expansion Plan   Volume Expansion Treatment Incentive Spirometer   Volume Expansion Frequency Acuity Level 4: BID-Prevention of atelectasis   Breath Sounds   Breath Sounds All Fields   All Lung Fields Breath Sounds diminished     Patient educated on use and benefits of respiratory medications. RT will continue Duonebs Q4 prn. RT will continue to follow.     Tiffanie Hendrix, RT on 7/1/2025 at 5:29 PM

## 2025-07-01 NOTE — PLAN OF CARE
"Comfort cares. Pain meds given overnight for back pain and readjusted pt for comfort. Plan for [facility] hospice TBD. POC orders maintained.     Problem: Adult Inpatient Plan of Care  Goal: Plan of Care Review  Description: The Plan of Care Review/Shift note should be completed every shift.  The Outcome Evaluation is a brief statement about your assessment that the patient is improving, declining, or no change.  This information will be displayed automatically on your shift  note.  Goal: Patient-Specific Goal (Individualized)  Description: You can add care plan individualizations to a care plan. Examples of Individualization might be:  \"Parent requests to be called daily at 9am for status\", \"I have a hard time hearing out of my right ear\", or \"Do not touch me to wake me up as it startles  me\".  Intervention: Identify and Manage Fall Risk  Recent Flowsheet Documentation  Taken 7/1/2025 0334 by Cookie Frost RN  Safety Promotion/Fall Prevention: safety round/check completed  Intervention: Prevent Skin Injury  Recent Flowsheet Documentation  Taken 7/1/2025 0430 by Cookie Frost RN  Body Position: weight shifting  Taken 7/1/2025 0334 by Cookie Frost RN  Body Position: weight shifting  Taken 7/1/2025 0030 by Cookie Frost RN  Body Position: refuses positioning  Goal: Optimal Comfort and Wellbeing  Outcome: Not Progressing  Intervention: Monitor Pain and Promote Comfort  Recent Flowsheet Documentation  Taken 7/1/2025 0512 by Cookie Frost RN  Pain Management Interventions:   care clustered   declines  Taken 7/1/2025 0430 by Cookie Frost RN  Pain Management Interventions: medication (see MAR)  Taken 7/1/2025 0334 by Cookie Frost RN  Pain Management Interventions: medication (see MAR)  Intervention: Provide Person-Centered Care  Recent Flowsheet Documentation  Taken 7/1/2025 0334 by Cookie Frost RN  Trust Relationship/Rapport:   questions encouraged   questions answered   empathic " listening provided   emotional support provided   care explained   Goal Outcome Evaluation:      Plan of Care Reviewed With: patient    Overall Patient Progress: no changeOverall Patient Progress: no change

## 2025-07-01 NOTE — PROGRESS NOTES
Pt alert and confused. Turned and repositioned x3 this shift. Pt had good appetite, and fed herself after tray setup. Pt SINAN visited at bedside this shift, updated on POC.  Pt had incontinent BM x1. Plan to discontinue to hospice in 1-2 days.

## 2025-07-01 NOTE — PLAN OF CARE
Goal Outcome Evaluation:      Plan of Care Reviewed With: patient    Overall Patient Progress: no changeOverall Patient Progress: no change    Outcome Evaluation: RN(Shift [id number])Patient on comfort cares. Alert and forgetful, thought she was on a cruise earlier. Cooperative with staff. Denies pain. Ate 25% of her dinner after tray set up. Herbert intact with dark urine. Plan for discharge to hospice bed when available outpatient. Bed alarm in place. Continue current POC.      Problem: Adult Inpatient Plan of Care  Goal: Plan of Care Review  Description: The Plan of Care Review/Shift note should be completed every shift.  The Outcome Evaluation is a brief statement about your assessment that the patient is improving, declining, or no change.  This information will be displayed automatically on your shift  note.  Recent Flowsheet Documentation  Taken 7/1/2025 1848 by Alejandra Gordon RN  Outcome Evaluation: RN(Shift [id number])Patient on comfort cares. Alert and forgetful, thought she was on a cruise earlier. Cooperative with staff. Denies pain. Ate 25% of her dinner after tray set up. Herbert intact with dark urine. Plan for discharge to hospice bed when available outpatient. Bed alarm in place. Continue current POC.  Plan of Care Reviewed With: patient  Overall Patient Progress: no change     Problem: Adult Inpatient Plan of Care  Goal: Plan of Care Review  Description: The Plan of Care Review/Shift note should be completed every shift.  The Outcome Evaluation is a brief statement about your assessment that the patient is improving, declining, or no change.  This information will be displayed automatically on your shift  note.  Recent Flowsheet Documentation  Taken 7/1/2025 1848 by Alejandra Gordon RN  Outcome Evaluation: RN(Shift [id number])Patient on comfort cares. Alert and forgetful, thought she was on a cruise earlier. Cooperative with staff. Denies pain. Ate 25% of her dinner after tray set up. Herbert  intact with dark urine. Plan for discharge to hospice bed when available outpatient. Bed alarm in place. Continue current POC.  Plan of Care Reviewed With: patient  Overall Patient Progress: no change     Problem: Adult Inpatient Plan of Care  Goal: Plan of Care Review  Description: The Plan of Care Review/Shift note should be completed every shift.  The Outcome Evaluation is a brief statement about your assessment that the patient is improving, declining, or no change.  This information will be displayed automatically on your shift  note.  Outcome: Progressing  Flowsheets (Taken 7/1/2025 4258)  Outcome Evaluation: RN(Shift [phone])Patient on comfort cares. Alert and forgetful, thought she was on a cruise earlier. Cooperative with staff. Denies pain. Ate 25% of her dinner after tray set up. Herbert intact with dark urine. Plan for discharge to hospice bed when available outpatient. Bed alarm in place. Continue current POC.  Plan of Care Reviewed With: patient  Overall Patient Progress: no change

## 2025-07-01 NOTE — PROGRESS NOTES
PALLIATIVE CARE PROGRESS NOTE  [facility]     Patient Name: Berenice Krause  Date of Admission: 6/24/2025   Today the patient was seen for: End of life cares     Recommendations & Counseling     GOALS OF CARE:   Comfort-focused treatment - plan to dismiss to [facility] with the support of [facility] tomorrow.    Family aware of the risk of aspiration and agree with plan for comfort-focused eating.   Appreciate Social Work arranging tomorrow's dismissal plan.   Maintain castro catheter for hospice dismissal.      ADVANCE CARE PLANNING:  Advance Directive dated 5/21/2008 has been sent to [employer] to be verified.  The document does not name a Health Care agent.   New POLST form dated 6/27/2025 can be found in [facility] docs.   Code status: No CPR- Do NOT Intubate     MEDICAL MANAGEMENT:   Comfort Care   Below are common symptoms routinely seen in patients with comfort focused goals and at the end of life. This patient may not currently exhibit all of these symptoms; however, if these were to occur our recommendations are as follows:     #Pain and #Air hunger/Dyspnea   Given CRF (Creatine Clearance 37.2 mL/min) would avoid the use of Morphine  Roxicodone PRN for pain, dyspnea or respiratory rate greater than 20.     #Anxiety    Lorazepam PO/SL 1 mg  q 3 hour as needed.      #Secretion burden   Robinul 0.2 mg (PO/IV) q 4 hours as needed. If ineffective, increase up to 0.3 mg   Consider atropine if Robinul ineffective after dose increase      #Nausea   Zofran 4 mg q 6 hours as needed. Can increase to 8 mg   Zyprexa 5 mg q 6 hours as needed      #Agitation  Aggressive control of other symptoms first, then  1st choice: Zyprexa 5 mg ODT or IM   2nd choice: Increase dose of Zyprexa to 10 mg or consider switch to Haldol   3rd choice: Lorazepam as above      PSYCHOSOCIAL/SPIRITUAL SUPPORT:  Family : Brother Giorgio, sister in law Jamil-Jamil and their daughter Penny Ng is not  and did not  "have children.  Has a sister with whom she is estranged.    Berenice lived next door to [employer] and [place] prior to her move to [place] 7 years ago.  Giorgio states she sold her house to Rashida Zandra. States they are very close and they look after her and assist her with finances.   [place] community: [place]   Appreciate input of ANNABELLE Hanks.    Palliative Care will continue to follow. Thank you for the consult and allowing us to aid in the care of Berenice Krause.    These recommendations have been discussed with bedside nurse YARELY Dos Santos.    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  She presented on 6/24 with shortness of breath and BLE edema, thought to be due to HF exacerbation.  Admitted for further work up and medical management.     Patient on diuretics at baseline, labs indicated elevated Cr question diuretic intolerance.  Cardiology consulted.  Per Hospitalist conversation, patient has not shown interest in further interventions.  Patient has become more confused throughout hospitalization with significant change on 6/26. Family requested to pursue a comfort-focused plan of care 6/27.      Interval History:     Multidisciplinary collaboration:  Appreciate nursing's efforts in maintaining Berenice's comfort and dignity.     Patient/family narrative  No visitors at bedside. Patient pleasantly confused as she indicates she is \"at work\" here.      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  HEENT:  Normocephalic/atraumatic, no scleral icterus, no nasal discharge, mouth moist.  RESP:   Symmetric chest rise on inhalation noted.  Normal respiratory effort.   M/S:   Denies pain/discomfort  SKIN:  Warm and dry to touch.  PAIN BEHAVIOR: Cooperative  Psych:  Normal affect.  Calm, cooperative, conversant appropriately.      Data Reviewed:     No results found for this or any previous visit (from the past 24 hours).      MANAGEMENT DISCUSSED with the following over the past 24 hours: bedside nurse YARELY Dos Santos.   35 MINUTES SPENT BY ME on the date of service doing chart review, history, exam, documentation & further activities per the note.

## 2025-07-01 NOTE — PLAN OF CARE
Comfort cares. Denies pain. Incont stool. Herbert in place for retention. Plan is for [facility] at discharge.      Goal Outcome Evaluation:      Plan of Care Reviewed With: patient    Overall Patient Progress: no changeOverall Patient Progress: no change    Outcome Evaluation: Comfort cares. Denies pain. Incont stool. Herbert in place for retention. Plan is for [facility] at discharge.          Problem: Adult Inpatient Plan of Care  Goal: Plan of Care Review  Description: The Plan of Care Review/Shift note should be completed every shift.  The Outcome Evaluation is a brief statement about your assessment that the patient is improving, declining, or no change.  This information will be displayed automatically on your shift  note.  Recent Flowsheet Documentation  Taken 7/1/2025 1447 by Raya Davila RN  Outcome Evaluation: Comfort cares. Denies pain. Incont stool. Herbert in place for retention. Plan is for [facility] at discharge.  Plan of Care Reviewed With: patient  Overall Patient Progress: no change  Goal: Absence of Hospital-Acquired Illness or Injury  Intervention: Identify and Manage Fall Risk  Recent Flowsheet Documentation  Taken 7/1/2025 0737 by Raya Davila, RN  Safety Promotion/Fall Prevention:   activity supervised   lighting adjusted  Intervention: Prevent Skin Injury  Recent Flowsheet Documentation  Taken 7/1/2025 0737 by Raya Davila RN  Body Position: position changed independently

## 2025-07-02 PROCEDURE — 99232 SBSQ HOSP IP/OBS MODERATE 35: CPT | Performed by: CLINICAL NURSE SPECIALIST

## 2025-07-02 PROCEDURE — 250N000013 HC RX MED GY IP 250 OP 250 PS 637: Performed by: CLINICAL NURSE SPECIALIST

## 2025-07-02 PROCEDURE — 99232 SBSQ HOSP IP/OBS MODERATE 35: CPT | Performed by: STUDENT IN AN ORGANIZED HEALTH CARE EDUCATION/TRAINING PROGRAM

## 2025-07-02 PROCEDURE — 250N000013 HC RX MED GY IP 250 OP 250 PS 637: Performed by: STUDENT IN AN ORGANIZED HEALTH CARE EDUCATION/TRAINING PROGRAM

## 2025-07-02 PROCEDURE — 120N000001 HC R&B MED SURG/OB

## 2025-07-02 RX ORDER — OXYBUTYNIN CHLORIDE 5 MG/1
5 TABLET, EXTENDED RELEASE ORAL AT BEDTIME
Status: DISCONTINUED | OUTPATIENT
Start: 2025-07-02 | End: 2025-07-03 | Stop reason: HOSPADM

## 2025-07-02 RX ADMIN — DORZOLAMIDE HYDROCHLORIDE AND TIMOLOL MALEATE 1 DROP: 20; 5 SOLUTION OPHTHALMIC at 21:34

## 2025-07-02 RX ADMIN — BRIMONIDINE TARTRATE 1 DROP: 2 SOLUTION/ DROPS OPHTHALMIC at 09:57

## 2025-07-02 RX ADMIN — BRIMONIDINE TARTRATE 1 DROP: 2 SOLUTION/ DROPS OPHTHALMIC at 20:03

## 2025-07-02 RX ADMIN — ASPIRIN 81 MG: 81 TABLET, DELAYED RELEASE ORAL at 09:56

## 2025-07-02 RX ADMIN — LORAZEPAM 1 MG: 2 LIQUID ORAL at 13:03

## 2025-07-02 RX ADMIN — DORZOLAMIDE HYDROCHLORIDE AND TIMOLOL MALEATE 1 DROP: 20; 5 SOLUTION OPHTHALMIC at 09:57

## 2025-07-02 RX ADMIN — LATANOPROST 1 DROP: 50 SOLUTION OPHTHALMIC at 23:55

## 2025-07-02 ASSESSMENT — ACTIVITIES OF DAILY LIVING (ADL)
ADLS_ACUITY_SCORE: 54
ADLS_ACUITY_SCORE: 54
ADLS_ACUITY_SCORE: 53
ADLS_ACUITY_SCORE: 54
ADLS_ACUITY_SCORE: 54
ADLS_ACUITY_SCORE: 53
ADLS_ACUITY_SCORE: 55
ADLS_ACUITY_SCORE: 54
ADLS_ACUITY_SCORE: 55
ADLS_ACUITY_SCORE: 53
ADLS_ACUITY_SCORE: 54
ADLS_ACUITY_SCORE: 55

## 2025-07-02 NOTE — PROGRESS NOTES
PALLIATIVE CARE PROGRESS NOTE  [facility]     Patient Name: Berenice Krause  Date of Admission: 6/24/2025   Today the patient was seen for: End of life care and emotional support     Recommendations & Counseling     GOALS OF CARE:   Comfort-focused treatment - plan to dismiss tomorrow to [facility] with the support of [facility].    Family aware of the risk of aspiration and agree with plan for comfort-focused eating.   Appreciate Social Work arranging tomorrow's dismissal plan.   Maintain castro catheter for hospice dismissal.      ADVANCE CARE PLANNING:  Advance Directive dated 5/21/2008 has been sent to [employer] to be verified.  The document does not name a Health Care agent.   POLST form dated 6/27/2025 can be found in ACP docs.   Code status: No CPR- Do NOT Intubate     MEDICAL MANAGEMENT:   Comfort Care   Below are common symptoms routinely seen in patients with comfort focused goals and at the end of life. This patient may not currently exhibit all of these symptoms; however, if these were to occur our recommendations are as follows:     #Pain and #Air hunger/Dyspnea   Given CRF (Creatine Clearance 37.2 mL/min) would avoid the use of Morphine  No opiates have been used the past day  Roxicodone PRN for pain, dyspnea or respiratory rate greater than 20.     #Anxiety    Lorazepam PO/SL 1 mg  q 3 hour as needed.      #Secretion burden   Robinul 0.2 mg (PO/IV) q 4 hours as needed. If ineffective, increase up to 0.3 mg   Consider atropine if Robinul ineffective after dose increase      #Nausea   Zofran 4 mg q 6 hours as needed. Can increase to 8 mg   Zyprexa 5 mg q 6 hours as needed      #Agitation  Aggressive control of other symptoms first, then  1st choice: Zyprexa 5 mg ODT or IM   2nd choice: Increase dose of Zyprexa to 10 mg or consider switch to Haldol   3rd choice: Lorazepam as above      PSYCHOSOCIAL/SPIRITUAL SUPPORT:  Family : Brother Giorgio, sister in law Chi-Chi and  "their daughter Zandra.  Berenice is not  and did not have children.  Has a sister with whom she is estranged.    Berenice lived next door to [employer] and [employer] prior to her move to [place] 7 years ago.  Giorgio states she sold her house to Zandra [place]. States they are very close and they look after her and assist her with finances.   Susanne community: [place]   Appreciate input of ANNABELLE Hanks.       Palliative Care will continue to follow. Thank you for the consult and allowing us to aid in the care of Berenice Krause.    These recommendations have been discussed with Hospitalist Akil cMfarland MD,  YUNIER Sosa and bedside nurse YARELY Denise.    ULI Holm CNS  [facility], Palliative Care  Securely message with the Vocera Web Console (learn more here) or  Text page via [facility] Paging/Directory        Assessment          Berenice Krause is a 80 year old female with a past medical history of HF w/ interior & inferolateral akinesis, COPD, hypertension, hyperlipidemia, prediabetes, central retinal artery occlusion of left eye and progressive cognitive impairment.  She presented on 6/24 with shortness of breath and BLE edema, thought to be due to HF exacerbation.  Admitted for further work up and medical management.     Patient on diuretics at baseline, labs indicated elevated Cr question diuretic intolerance.  Cardiology consulted.  Per Hospitalist conversation, patient has not shown interest in further interventions.  Patient has become more confused throughout hospitalization with significant change on 6/26. Family requested to pursue a comfort-focused plan of care 6/27.      Interval History:     Multidisciplinary collaboration:  Appreciate the bedside nursing staff for providing Berenice comfort and dignity.     Patient/family narrative  Pleasantly confused elderly female who asked \"Are you the one going out to dinner with Giorgio?\" I explained I did not have dinner plans with her brother. " She went on to discuss her plan to watch TV.    Review of Systems:     Besides above, ROS was reviewed and is unremarkable        Physical Exam:   Resp:  [24] 24  SpO2:  [91 %-95 %] 91 %  77 lbs 8 oz    Physical Exam  GEN:  Cachetic, frail, severely kyphotic elderly female. Alert, oriented to self only, appears comfortable, no apparent distress.  HEENT:  Normocephalic/atraumatic, no scleral icterus, no nasal discharge, mouth moist.  RESP:   Symmetric chest rise on inhalation noted.  Normal respiratory effort.  SKIN:  Warm and dry to touch, no mottling.  PAIN BEHAVIOR: Cooperative  Psych:  Normal affect.  Calm, cooperative, conversant, but does not recall recent events.       Data Reviewed:     No results found for this or any previous visit (from the past 24 hours).    MANAGEMENT DISCUSSED with the following over the past 24 hours: Hospitalist Akil Mcfarland MD,  YUNIER Sosa and bedside nurse YARELY Denise.    25 MINUTES SPENT BY ME on the date of service doing chart review, history, exam, documentation & further activities per the note.

## 2025-07-02 NOTE — PROGRESS NOTES
Care Management Follow Up    Length of Stay (days): 8    Expected Discharge Date: 07/02/2025     Concerns to be Addressed: discharge planning     Patient plan of care discussed at interdisciplinary rounds: Yes    Anticipated Discharge Disposition:  [facility]              Anticipated Discharge Services:  [facility]  Anticipated Discharge DME:      Patient/family educated on Medicare website which has current facility and service quality ratings:    Education Provided on the Discharge Plan:    Patient/Family in Agreement with the Plan: yes    Referrals Placed by CM/SW:    Private pay costs discussed: transportation costs    Discussed  Partnership in Safe Discharge Planning  document with patient/family: No     Handoff Completed: No, handoff not indicated or clinically appropriate    Additional Information:  CHAZ spoke with Kathia from [facility] ([phone]) regarding pt's admission tomorrow. Kathia stated that they were planning pt's admit to hospice in the early afternoon. CHAZ spoke with CORY Sandoval at [facility] ([phone]), regarding pt discharge to [facility] tomorrow. Lori is all set for pt to come to facility tomorrow.     CHAZ called pt's niece, Chari, regarding transportation. VM left encouraging Chari to return call.     ADDENDUM 2:00pm  CHAZ spoke with pt's brother, Bill, and sister in law regarding discharge plans. Transportation options discussed. Reviewed out of pocket cost for [facility] transport, $99.20 base and $6.38 per mile to the destination. Spoke with Bill, they expressed understanding and are agreeable to this.      Transport arranged for 7/3 between 10:30-11:30am. Update provided to pt's sister in law ([phone]).     Next Steps: Follow for discharge.     DARIO Patel, LGSW  [facility] Care Coordinator/ Med Surg [address] [place] [address]  [phone]

## 2025-07-02 NOTE — PLAN OF CARE
Continuing with comfort cares. Pt only oriented to self, but redirectable. Herbert cares completed. Pt repositioned self well in bed overnight. Denied pain. Appeared to sleep well between cares. Stated she is excited to eat some food today.     Plan: To discharge to [facility] on July 3rd.     Problem: Adult Inpatient Plan of Care  Goal: Absence of Hospital-Acquired Illness or Injury  Intervention: Identify and Manage Fall Risk  Recent Flowsheet Documentation  Taken 7/1/2025 2300 by Marilee Ball RN  Safety Promotion/Fall Prevention:   activity supervised   lighting adjusted  Intervention: Prevent Skin Injury  Recent Flowsheet Documentation  Taken 7/1/2025 2300 by Marilee Ball RN  Body Position: turned  Taken 7/1/2025 2000 by Marilee Ball RN  Body Position: turned     Problem: Adult Inpatient Plan of Care  Goal: Absence of Hospital-Acquired Illness or Injury  Intervention: Identify and Manage Fall Risk  Recent Flowsheet Documentation  Taken 7/1/2025 2300 by Marilee Ball RN  Safety Promotion/Fall Prevention:   activity supervised   lighting adjusted  Intervention: Prevent Skin Injury  Recent Flowsheet Documentation  Taken 7/1/2025 2300 by Marilee Ball RN  Body Position: turned  Taken 7/1/2025 2000 by Marilee Ball RN  Body Position: turned     Problem: Adult Inpatient Plan of Care  Goal: Absence of Hospital-Acquired Illness or Injury  Intervention: Identify and Manage Fall Risk  Recent Flowsheet Documentation  Taken 7/1/2025 2300 by Marilee Ball RN  Safety Promotion/Fall Prevention:   activity supervised   lighting adjusted  Intervention: Prevent Skin Injury  Recent Flowsheet Documentation  Taken 7/1/2025 2300 by Marilee Ball RN  Body Position: turned  Taken 7/1/2025 2000 by Marilee Ball RN  Body Position: turned   Goal Outcome Evaluation:

## 2025-07-02 NOTE — PROGRESS NOTES
[facility]    Medicine Progress Note - Hospitalist Service    Date of Admission:  6/24/2025    Assessment & Plan   Berenice Krause is a 80 year old female admitted on 6/24/2025 lower extremity swelling and SOB due thought due to exacerbation of HFmrEF.  She was also found to have an apparent large hematoma over the dorsum of the right foot.     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     In the emergency department the patient was afebrile, hemodynamically stable and was requiring 3L via nasal cannula to maintain oxygen saturations >92%.    NT proBNP 13,371, troponin less than 6, WBC 12.2.    ECG: sinus tachycardia, but without ST segment dynamic changes.     Chest x-ray: interstitial prominence and hazy interstitial opacities.   ED interventions: Lasix 40 mg IV and was admitted for further cares of suspected heart failure exacerbation.     Ms. Krause was admitted to a medical bed on telemetry after having received a dose of Furosemide 40 mg IV in the ED. She got a second dose before it was noted that her creatinine was rising and her BP was falling. With these findings, Lisinopril was reduced to 2.5 mg daily with parameters to hold and the lasix was stopped.     Her Echo is not significantly different than the one completed in 11/2024, but note that she has both reduced EF and multiple moderate valvular insufficiencies.     The pt is quite limited in function. She is able to walk several feet at baseline, but with a walker and assist. Family has recognized that she has developed significant memory deficits. She is unable to answer many questions about her preferences for longer term goals of care. She seems to avoid answering difficult or complex questions. Her brother, sister-in-law and niece are close and involved family members.  They are willing  "to be decision makers for her, but they are not \"designated\" POA.     Pt has continued pleasantly confused throughout the hospital stay.     DX:  Hypoxic resp failure and peripheral edema in the setting of known HFmrEF, moderate AORTIC STENOSIS, Severe Emphysema (not previously oxygen-dependent) and severe Thoracic Kyphosis. NT-BNP 13K and TTE from 11/2024 40-45%. Echo obtained hospital stay unchanged from previous. Hypoxia may be related to CHF v poss infiltrates.  Severe emphysema based on CT scan.   Right foot bruising was initially of some concern, but the pulse is brisk and the edema has resolved.   HARJIT due to intolerance to diuresis. This bodes poorly for CHF management. Baseline creat suggests slightly better than GFR based on Cystatin C.  Mild hyperglycemia in the setting of pre-diabetes NIDDM. HbA1c 6.0.   Leukocytosis with left shift. Resolved. CRP 11 and Procalcitonin 0.12 - not consistent with significant infection. No empiric abx.  HTN.  Pt became hypotensive and BP meds have been stopped.   Hx Central retinal artery occlusion, left eye. On ASA chronically.  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  Cognitive impairment is suggested in Dr. Ennis' admission note.  I also have observed that the pt is oriented to self only.  She can identify family members also. And although she often appears appropriate, she evades questions that are difficult and often is frankly confused. She has known diffuse changes on MRI of the Brain \"typically associated with chronic hypertension or amyloid angiopathy\".   Severe malnutrition, suspect that this is related to undiagnosed aspiration.  She seems to cough with every swallow and has a wet cough with eating. This is likely made worse by the severe Kyphosis.  Goals of care. Discussed with family that the pt is likely at least, to need to return to the hospital relatively frequently due to her many combined issues. They have decided to pursue comfort care.   PLAN:  Cont to " hold diuretics.  Stopped Lisinopril due to worsening HARJIT along with hypotension.  Other usual home meds were initially resumed. Subsequently, non-comfort medications (other than aspirin) have been stopped.  Briefly discussed with the pt the cardiologist's (Dr. Wong) assessment. Palliative care was consulted and ultimately, a Hospice plan for dispo was planned.   Anticipate discharge to SNF with plan to enter Hospice.     Diet: Regular Diet Adult    DVT Prophylaxis: Enoxaparin (Lovenox) SQ  Herbert Catheter: PRESENT, indication: End of life  Lines: None     Cardiac Monitoring: None  Code Status: No CPR- Do NOT Intubate      Clinically Significant Risk Factors               # Hypoalbuminemia: Lowest albumin = 3.1 g/dL at 6/25/2025  7:21 AM, will monitor as appropriate     # Hypertension: Noted on problem list    # Heart failure, NOS: heart failure noted on the problem list and last echo with EF 40-50%               # Financial/Environmental Concerns:           Social Drivers of Health    Tobacco Use: Medium Risk (11/19/2024)    [facility]    Patient History     Smoking Tobacco Use: Former     Smokeless Tobacco Use: Never          Disposition Plan     Medically Ready for Discharge: Ready now.      Akil Mcfarland MD  Hospitalist Service  [facility]  Securely message with [place] (more info)  Text page via [employer] Paging/Directory   ______________________________________________________________________    Interval History   Pleasantly confused.  When I advised her that she will go to hospice, she objected and said [place] go to [place].    Physical Exam   Vital Signs:           Resp: 24 SpO2: (!) 91 % O2 Device: Nasal cannula Oxygen Delivery: 3 LPM  Weight: 77 lbs 8 oz    General Appearance: Very thin, frail, elderly woman in NAD. Calm in bed on O2 at 2 LPM by NC.  Severe kyphosis noted. Marked decrease in muscular bulk and subcutaneous fat.   Respiratory: No  increased WOB. Distant breath sounds throughout. No obvious rales or wheeze.  Severe kyphoscoliosis.  Cardiovascular: RRR with soft systolic Aortic as well as Tricuspid area murmurs.   GI: soft, NTND  Skin: the right foot is bruised-appearing with purple discoloration. Dorsalis pedis pulse easily palpated on the right foot at this time. Edema has resolved.  Trace edema otherwise. Decreased muscular bulk noted over the upper mmore so than the lower extremities.     Medical Decision Making       25 MINUTES SPENT BY ME on the date of service doing chart review, history, exam, documentation & further activities per the note.    No results found for this or any previous visit (from the past 24 hours).

## 2025-07-02 NOTE — PLAN OF CARE
Goal Outcome Evaluation:      Plan of Care Reviewed With: patient    Overall Patient Progress: improvingOverall Patient Progress: improving    Outcome Evaluation: Confused severity has shifted throughout day, sometimes only alert to self - very anxious and frustrated, other times alert and oriented - able to discuss and talk about disposition plans. Pt complains of back pain - she certainly has pretty pronounced kyphosis, but refused pain medications. In an anxious spell she was also refusing anxiety management - but I was ultimately able to convince her and gave the PRN PO ativan. 50% of diet intake at meals. Snacks between meals. Due to difficulty with lift - we used sera steady to get pt from chair to bed early afternoon and she did ok with this assist of 2. BL 3L O2. Pt has castro for end of life - cares done. Pt was complaining of urge to void, I had pt bladder scanned for 8ml, and oxybutinin added  - at time of administration patient sleeping - I decided to defer until later as pt finally comfortable. Plan is to discharge to hospice tomorrow - wheel chair ride [id number]. Scattered scabs/bruising. Redness adolfo of bottom / mep added.       Problem: Adult Inpatient Plan of Care  Goal: Plan of Care Review  Description: The Plan of Care Review/Shift note should be completed every shift.  The Outcome Evaluation is a brief statement about your assessment that the patient is improving, declining, or no change.  This information will be displayed automatically on your shift  note.  Outcome: Progressing  Flowsheets (Taken 7/2/2025 4731)  Outcome Evaluation: Confused severity has shifted throughout day, sometimes only alert to self - very anxious and frustrated, other times alert and oriented - able to discuss and talk about disposition plans. Pt complains of back pain - she certainly has pretty pronounced kyphosis, but refused pain medications. In an anxious spell she was also refusing anxiety management - but I was  "ultimately able to convince her and gave the PRN PO ativan. 50% of diet intake at meals. Snacks between meals. Due to difficulty with lift - we used sera steady to get pt from chair to bed early afternoon and she did ok with this assist of 2. BL 3L O2. Pt has castro for end of life - cares done. Pt was complaining of urge to void, I had pt bladder scanned for 8ml, and oxybutinin added  - at time of administration patient sleeping - I decided to defer until later as pt finally comfortable. Plan is to discharge to hospice tomorrow - wheel chair ride [id number].  Plan of Care Reviewed With: patient  Overall Patient Progress: improving  Goal: Patient-Specific Goal (Individualized)  Description: You can add care plan individualizations to a care plan. Examples of Individualization might be:  \"Parent requests to be called daily at 9am for status\", \"I have a hard time hearing out of my right ear\", or \"Do not touch me to wake me up as it startles  me\".  Outcome: Progressing  Goal: Absence of Hospital-Acquired Illness or Injury  Outcome: Progressing  Goal: Optimal Comfort and Wellbeing  Outcome: Progressing  Intervention: Provide Person-Centered Care  Recent Flowsheet Documentation  Taken 7/2/2025 0800 by Howie Edwards, RN  Trust Relationship/Rapport:   choices provided   questions answered  Goal: Readiness for Transition of Care  Outcome: Progressing         "

## 2025-07-02 NOTE — PROGRESS NOTES
SPIRITUAL HEALTH SERVICES - Progress Note  MS 3    Referral Source/ Reason for Visit: Met with patient regarding staff Vocera request for visit for emotional support.    Summary and Recommendations -     Compassionately listened to patient engage in life review.    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  She was an .    Patient achieved one of her life goals of installing a backyard pool at her home.  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    She also gave thanks for her long and happy marriage.  She still misses her ; who  of a cardiac event many years ago.      Plan: Gifted patient a prayer quilt crafted by volunteers.  No further needs. SHS remains available upon request.    Rev. KAYLIN Thacker.  Staff     SHS available  for emergent requests/ referrals, either by paging the on-call  or by entering an ASAP/STAT consult in [employer], which will also page the on-call .      Assessment    Saw pt Berenice Krause regarding length of stay visit for emotional support.    Patient/Family Understanding of Illness and Goals of Care - Patient is experiencing dementia.  She does not understand why she is in the hospital.  Medical record states that she has serious cardiac issues, COPD, visual impairment and other hospital problems.  Notes say that she is discharging today to [facility], where she will receive hospice services.    Distress and Loss - She continues to name the death of her  many years ago as a significant grief.    Strengths, Coping and Resources - She lists her brother and sister-in-law as sources of support.    Meaning, Beliefs and Spirituality - Patient identifies as Hinduism.  She has no home Oriental orthodox at present.

## 2025-07-02 NOTE — PROGRESS NOTES
Care Management Follow Up    Length of Stay (days): 7    Expected Discharge Date: 07/02/2025     Concerns to be Addressed: discharge planning     Patient plan of care discussed at interdisciplinary rounds: Yes    Anticipated Discharge Disposition:  Patient has been accepted @ [facility]. The plan will be for her to discharge on Thursday July 3, 2025.     Anticipated Discharge Services:  [facility]  Anticipated Discharge DME:  Hospice will provide    Patient/family educated on Medicare website which has current facility and service quality ratings:  yes  Education Provided on the Discharge Plan:  yes  Patient/Family in Agreement with the Plan: yes    Referrals Placed by CM/SW:  yes  Private pay costs discussed: private room/amenity fees and transportation costs    Discussed  Partnership in Safe Discharge Planning  document with patient/family: No     Handoff Completed: No, handoff not indicated or clinically appropriate    Additional Information:  CHAZ talked with patient's brother,Bill today. Patient has been accepted @ [facility]. He is coordinating with their admission person to get paperwork completed tomorrow. The plan would be for her to move in on Thursday July 3, 25. CHAZ spoke with the DON Park [phone]. She confirmed the plan. Park will be contacting [facility] and coordinating getting the appropriate DME out to the facility tomorrow.     Next Steps: CHAZ will follow up [facility] regarding the DME. CHAZ will also follow up with [facility] to confirm discharge plan for Thursday.    CORNELIO Blue   Inpatient Care Coordination   Supervisor  [facility]  [phone]    CORNELIO Gould

## 2025-07-03 VITALS
RESPIRATION RATE: 20 BRPM | BODY MASS INDEX: 14.17 KG/M2 | HEART RATE: 93 BPM | OXYGEN SATURATION: 93 % | SYSTOLIC BLOOD PRESSURE: 101 MMHG | TEMPERATURE: 98.5 F | WEIGHT: 77.5 LBS | DIASTOLIC BLOOD PRESSURE: 54 MMHG

## 2025-07-03 PROCEDURE — 999N000157 HC STATISTIC RCP TIME EA 10 MIN

## 2025-07-03 PROCEDURE — 99238 HOSP IP/OBS DSCHRG MGMT 30/<: CPT | Performed by: STUDENT IN AN ORGANIZED HEALTH CARE EDUCATION/TRAINING PROGRAM

## 2025-07-03 PROCEDURE — 250N000013 HC RX MED GY IP 250 OP 250 PS 637: Performed by: INTERNAL MEDICINE

## 2025-07-03 PROCEDURE — 94640 AIRWAY INHALATION TREATMENT: CPT

## 2025-07-03 RX ORDER — OXYBUTYNIN CHLORIDE 5 MG/1
5 TABLET, EXTENDED RELEASE ORAL AT BEDTIME
Qty: 30 TABLET | Refills: 0 | Status: SHIPPED | OUTPATIENT
Start: 2025-07-03

## 2025-07-03 RX ADMIN — ACETAMINOPHEN 1000 MG: 500 TABLET, FILM COATED ORAL at 04:24

## 2025-07-03 RX ADMIN — FLUTICASONE FUROATE AND VILANTEROL TRIFENATATE 1 PUFF: 100; 25 POWDER RESPIRATORY (INHALATION) at 08:25

## 2025-07-03 ASSESSMENT — ACTIVITIES OF DAILY LIVING (ADL)
ADLS_ACUITY_SCORE: 55
ADLS_ACUITY_SCORE: 54
ADLS_ACUITY_SCORE: 54
ADLS_ACUITY_SCORE: 55
ADLS_ACUITY_SCORE: 55
ADLS_ACUITY_SCORE: 54
ADLS_ACUITY_SCORE: 55
ADLS_ACUITY_SCORE: 55

## 2025-07-03 NOTE — DISCHARGE SUMMARY
"[facility]  Hospitalist Discharge Summary      Date of Admission:  6/24/2025  Date of Discharge:  7/3/2025  Discharging Provider: Akil Mcfarland MD  Discharge Service: Hospitalist Service    Discharge Diagnoses   ***    Clinically Significant Risk Factors          Follow-ups Needed After Discharge   Follow-up Appointments       Hospital Follow-up with Existing Primary Care Provider (PCP)          Schedule Primary Care visit within: 7 Days           {Additional follow-up instructions/to-do's for PCP    :***}    Unresulted Labs Ordered in the Past 30 Days of this Admission       No orders found from 5/25/2025 to 6/25/2025.        These results will be followed up by ***    Discharge Disposition   {Discharge to:7919321::\"Discharged to home\"}  Condition at discharge: {CONDITION:655445::\"Stable\"}    Hospital Course   {OPTIONAL -- use to select A&P section   :951096}    Consultations This Hospital Stay   OCCUPATIONAL THERAPY ADULT IP CONSULT  CARDIOLOGY IP CONSULT  PHYSICAL THERAPY ADULT IP CONSULT  OCCUPATIONAL THERAPY ADULT IP CONSULT  CARE MANAGEMENT / SOCIAL WORK IP CONSULT  PALLIATIVE CARE ADULT IP CONSULT  NUTRITION SERVICES ADULT IP CONSULT  SPIRITUAL HEALTH SERVICES IP CONSULT    Code Status   No CPR- Do NOT Intubate    Time Spent on this Encounter   {How much time did you spend on discharge?:36783686}       Akil Mcfarland MD  [facility] 3 MEDICAL SURGICAL  [address] [facility] [address]  Phone: [phone]  Fax: [phone]  ______________________________________________________________________    Physical Exam   Vital Signs:       Pulse: 93   Resp: 20 SpO2: 93 % O2 Device: Nasal cannula Oxygen Delivery: 3 LPM  Weight: 77 lbs 8 oz  {Recommend personal SmartPhrase or Notewriter for exam (OPTIONAL)   :489631}       Primary Care Physician   Valerie Vincent    Discharge Orders      Reason for your hospital stay    Respiratory failure, will now continue hospice " cares.     Activity    Your activity upon discharge: activity as tolerated     Tubes and Drains    Current Tubes and Drains:     Drain  Duration           External Urinary Catheter 8 days    Urinary Drain 06/28/25 4 days    Urinary Drain 06/28/25 4 days    Urinary Drain 06/28/25 Urethral Catheter 4 days         To straight gravity drainage. Change catheter every 2 weeks and PRN for leaking or decreased urine output with signs of bladder distention. DO NOT change catheter without a specific Provider order IF diagnosis of benign prostatic hypertrophy (BPH), neurogenic bladder, or other urological conditions      Diet    Follow this diet upon discharge: Current Diet:Orders Placed This Encounter      Regular Diet Adult     Hospital Follow-up with Existing Primary Care Provider (PCP)            Significant Results and Procedures   {Data for Discharge Summary:370158}    Discharge Medications      Review of your medicines        START taking        Dose / Directions   oxyBUTYnin ER 5 MG 24 hr tablet  Commonly known as: DITROPAN XL  Used for: Bladder spasm      Dose: 5 mg  Take 1 tablet (5 mg) by mouth at bedtime.  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  Quantity: 30 tablet  Refills: 3     bimatoprost 0.01 % Soln  Commonly known as: LUMIGAN      Dose: 1 drop  Place 1 drop into both eyes At Bedtime  Refills: 0     brimonidine 0.1 % ophthalmic solution  Commonly known as: ALPHAGAN P      Dose: 1 drop  Place 1 drop into both eyes 2 times daily  Refills: 0     Cosopt 2-0.5 % ophthalmic solution  Generic drug: dorzolamide-timolol       Dose: 1 drop  Place 1 drop into both eyes 2 times daily  Refills: 0            STOP taking      atorvastatin 40 MG tablet  Commonly known as: LIPITOR        cholecalciferol 50 MCG (2000 UT) Caps        lisinopril 20 MG tablet  Commonly known as: ZESTRIL                  Where to get your medicines        These medications were sent to [facility] [place], [place] - [address] [address] [address]      Phone: [phone]   oxyBUTYnin ER 5 MG 24 hr tablet       Allergies   No Known Allergies   kyphoscoliosis.  Cardiovascular: S1-S2 normal.  Aortic and tricuspid area murmurs.  GI: Soft and nontender  Skin: No rash  Other: Edema improved.  Right foot bruising is improving.         Primary Care Physician   Valerie Vincent    Discharge Orders      Reason for your hospital stay    Respiratory failure, will now continue hospice cares.     Activity    Your activity upon discharge: activity as tolerated     Tubes and Drains    Current Tubes and Drains:     Drain  Duration           External Urinary Catheter 8 days    Urinary Drain 06/28/25 4 days    Urinary Drain 06/28/25 4 days    Urinary Drain 06/28/25 Urethral Catheter 4 days         To straight gravity drainage. Change catheter every 2 weeks and PRN for leaking or decreased urine output with signs of bladder distention. DO NOT change catheter without a specific Provider order IF diagnosis of benign prostatic hypertrophy (BPH), neurogenic bladder, or other urological conditions      Diet    Follow this diet upon discharge: Current Diet:Orders Placed This Encounter      Regular Diet Adult     Hospital Follow-up with Existing Primary Care Provider (PCP)            Significant Results and Procedures   Most Recent 3 CBC's:  Recent Labs   Lab Test 06/29/25  0624 06/27/25  0531 06/26/25  0638 06/25/25  0721   WBC 9.5  --  10.5 11.6*   HGB 13.5  --  13.4 14.1   MCV 96 96 95 96    200 205 218     Most Recent 3 BMP's:  Recent Labs   Lab Test 06/29/25  0624 06/27/25  0531 06/26/25  0638    140 136   POTASSIUM 4.4 3.7 3.6   CHLORIDE 99 99 97*   CO2 35* 37* 34*   BUN 23.1* 32.3* 40.3*   CR 0.46* 0.67 1.09*   ANIONGAP 6* 4* 5*   RICHARD 8.3* 8.0* 7.9*   * 108* 107*     Most Recent 2 LFT's:  Recent Labs   Lab Test 06/25/25  0721 06/24/25  1345   AST 28 25   ALT 22 30   ALKPHOS 80 93   BILITOTAL 1.0 0.9   ,   Results for orders placed or performed during the hospital encounter of 06/24/25   Chest XR,  PA & LAT    Narrative    EXAM: XR CHEST 2  VIEWS  LOCATION: [facility]  DATE: 6/24/2025    INDICATION: Dyspnea, BLE swelling.  COMPARISON: 1/23/2018.      Impression    IMPRESSION:   Cardiomegaly. Severe thoracic kyphosis limits assessment. Mild streaky opacity suggested at the lower lungs that could be mild airspace disease versus atelectasis. Bilateral interstitial prominence and hazy opacities suggestive of mild edema.   XR Foot Port Right 3 Views    Narrative    EXAM: XR FOOT PORT RIGHT 3 VIEWS  LOCATION: [facility]  DATE: 6/25/2025    INDICATION: large hematoma; r o underlying fracture  COMPARISON: None.      Impression    IMPRESSION: 3 views right foot. Marked soft tissue swelling about the dorsum of the metatarsals and tarsal metatarsal junction. No acute fractures seen. Right foot otherwise unremarkable. If continued clinical concern, additional cross-sectional imaging   should be considered.   CT Chest w/o Contrast    Narrative    EXAM: CT CHEST W/O CONTRAST  LOCATION: [facility]  DATE: 6/26/2025    INDICATION: Patient with severe kyphoscoliosis here with hypoxia. unclear whether CHF, advanced COPD or aspiration problems.  COMPARISON: Chest radiograph 6/24/2025.  TECHNIQUE: CT chest without IV contrast. Multiplanar reformats were obtained. Dose reduction techniques were used.  CONTRAST: None.    FINDINGS:   LUNGS AND PLEURA: Severe upper lobe predominant panlobular emphysema. There is some trace interlobular septal thickening in the lung bases with small bilateral pleural effusions and associated compressive atelectasis, left greater than right. No lobar   airspace consolidation or pneumothorax. 4 mm noncalcified right lower lobe pulmonary nodule (series 4 image 115). Large calcified granuloma in the left upper lobe.    MEDIASTINUM/AXILLAE: Prior median sternotomy. No suspicious lymphadenopathy. No pericardial effusion.    CORONARY ARTERY CALCIFICATION: Severe.    UPPER ABDOMEN:  Unremarkable.    MUSCULOSKELETAL: Severe, exaggerated thoracic kyphosis with multiple mid and lower thoracic vertebral body compression deformities, not significantly changed from most recent chest radiograph. No definite acute bony abnormality.      Impression    IMPRESSION:   1.  Severe emphysema with likely mild superimposed pulmonary edema and small bilateral pleural effusions.  2.  Incidental 4 mm noncalcified right lower lobe pulmonary nodule, consider follow-up described below.    REFERENCE:  Guidelines for Management of Incidental Pulmonary Nodules Detected on CT Images: From the Fleischner Society 2017.   Guidelines apply to incidental nodules in patients who are 35 years or older.  Guidelines do not apply to lung cancer screening, patients with immunosuppression, or patients with known primary cancer.    SINGLE NODULE  Nodule size <6 mm  Low-risk patients: No follow-up needed.  High-risk patients: Optional follow-up CT at 12 months.         Echocardiogram Complete     Value    LVEF  40-45%    Narrative    [id number]  [id number]  [id number]  [id number]^ROB^ROHIT^     [facility]  Echocardiography Laboratory  [address] [place] [address]     Name: SENA MCGOVERN  MRN: [id number]  : 1945  Study Date: 2025 07:30 AM  Age: 80 yrs  Gender: Female  Patient Location: [facility]  Reason For Study: Heart Failure  Ordering Physician: ROHIT RIVAS  Referring Physician: Valerie Vincent  Performed By: Jeanne Lezama     BSA: 1.8 m2  Height: 62 in  Weight: 178 lb  HR: 72  BP: 148/90 mmHg  ______________________________________________________________________________  Procedure  Echocardiogram with two-dimensional, color and spectral Doppler. Optison (NDC  #1771-2000) given intravenously.  ______________________________________________________________________________  Interpretation Summary     Left ventricular systolic function is mild to moderately reduced.The visual  ejection fraction is  40-45%.Basal to mid lateral wall hypokinesia, basal to  mid inferior wall hypokinesia, inferolateral wall hypokinesia  The right ventricular systolic function is mildly reduced.The right ventricle  is mildly dilated.  There is severe mitral annular calcification.There is mild mitral  stenosis.There is moderate (2+) mitral regurgitation.  There is moderate (2+) tricuspid regurgitation.  There is moderate (2+) aortic regurgitation-peak aortic valve velocity 3.5  m/s, mean gradients 30 mmHg, aortic valve area 1.5 cmÂ .Aortic regurgitation  Doppler pressure half-time is low at 230 ms-this could indicate worse AI then  visually estimated, alternatively this could be due to high LV filling  pressure (however E/E prime ratio is in the indeterminate range)  Pulmonary hypertension- RVSP 16 mm hg +RA.  IVC diameter >2.1 cm collapsing <50% with sniff suggests a high RA pressure  estimated at 15 mmHg or greater.     Compared to prior study dated 11/5/2024 no significant changes     ______________________________________________________________________________  Left Ventricle  Left ventricular systolic function is mild to moderately reduced. The visual  ejection fraction is 40-45%. Basal to mid lateral wall hypokinesia, basal to  mid inferior wall hypokinesia, inferolateral wall hypokinesia.     Right Ventricle  The right ventricle is mildly dilated. The right ventricular systolic function  is mildly reduced.     Atria  The left atrium is moderately dilated. The right atrium is mildly dilated.  There is no color Doppler evidence of an atrial shunt.     Mitral Valve  There is severe mitral annular calcification. There is moderate (2+) mitral  regurgitation. There is mild mitral stenosis. The mean mitral valve gradient  is 3.5 mmHg.     Tricuspid Valve  There is moderate (2+) tricuspid regurgitation. The right ventricular systolic  pressure is approximated at 46.0 mmHg plus the right atrial pressure.  Pulmonary hypertension.      Aortic Valve  The aortic valve is trileaflet. There is moderate (2+) aortic regurgitation.  Aortic regurgitation Doppler pressure half-time is low at 230 ms-this could  indicate worse AI then visually estimated, alternatively this could be due to  high LV filling pressure (however E/E prime ratio is in the indeterminate  range). Moderate valvular aortic stenosis. Peak aortic valve velocity 3.5 m/s,  mean gradients 30 mmHg, aortic valve area 1.5 cmÂ .     Pulmonic Valve  There is mild (1+) pulmonic valvular regurgitation. There is no pulmonic  valvular stenosis.     Vessels  The aortic root is normal size. Normal size ascending aorta. IVC diameter >2.1  cm collapsing <50% with sniff suggests a high RA pressure estimated at 15 mmHg  or greater.     Pericardium  There is no pericardial effusion.     Rhythm  Sinus rhythm was noted.  ______________________________________________________________________________  MMode/2D Measurements & Calculations  IVC diam: 2.2 cm     Ao root diam: 3.6 cm  LVOT diam: 2.1 cm  LVOT area: 3.4 cm2  Ao root diam index Ht(cm/m): 2.3  Ao root diam index BSA (cm/m2): 2.0  LA Volume (BP): 38.7 ml  LA Volume Index (BP): 21.3 ml/m2  RV Base: 3.4 cm  TAPSE: 1.2 cm     Doppler Measurements & Calculations  MV E max garcia: 77.2 cm/sec  MV A max garcia: 143.7 cm/sec  MV E/A: 0.54  MV max P.8 mmHg  MV mean PG: 3.5 mmHg  MV V2 VTI: 36.4 cm  MVA(VTI): 3.2 cm2  MV dec slope: 509.7 cm/sec2  MV dec time: 0.15 sec  Ao V2 max: 343.1 cm/sec  Ao max P.2 mmHg  Ao V2 mean: 246.6 cm/sec  Ao mean P.7 mmHg  Ao V2 VTI: 76.4 cm  LIYA(I,D): 1.5 cm2  LIYA(V,D): 1.5 cm2  AI P1/2t: 218.0 msec  LV V1 max P.9 mmHg  LV V1 max: 157.2 cm/sec  LV V1 VTI: 34.8 cm  SV(LVOT): 116.7 ml  SI(LVOT): 64.1 ml/m2  PA V2 max: 115.5 cm/sec  PA max P.3 mmHg  PA acc time: 0.17 sec  PI end-d garcia: 187.7 cm/sec  TR max garcia: 322.8 cm/sec  TR max P.0 mmHg  AV Garcia Ratio (DI): 0.46  LIYA Index (cm2/m2): 0.84  E/E' avg:  10.6  Lateral E/e': 8.3  Medial E/e': 12.9  RV S Garcia: 9.5 cm/sec     ______________________________________________________________________________  Report approved by: Juve Glasgow MD on 06/25/2025 09:29 AM             Discharge Medications      Review of your medicines        START taking        Dose / Directions   oxyBUTYnin ER 5 MG 24 hr tablet  Commonly known as: DITROPAN XL  Used for: Bladder spasm      Dose: 5 mg  Take 1 tablet (5 mg) by mouth at bedtime.  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  Quantity: 30 tablet  Refills: 3     bimatoprost 0.01 % Soln  Commonly known as: LUMIGAN      Dose: 1 drop  Place 1 drop into both eyes At Bedtime  Refills: 0     brimonidine 0.1 % ophthalmic solution  Commonly known as: ALPHAGAN P      Dose: 1 drop  Place 1 drop into both eyes 2 times daily  Refills: 0     Cosopt 2-0.5 % ophthalmic solution  Generic drug: dorzolamide-timolol      Dose: 1 drop  Place 1 drop into both eyes 2 times daily  Refills: 0            STOP taking      atorvastatin 40 MG tablet  Commonly known as: LIPITOR        cholecalciferol 50 MCG (2000 UT) Caps        lisinopril 20 MG tablet  Commonly known as: ZESTRIL                  Where to get your medicines        These medications were sent to [facility] [address] [address] [address]      Phone: [phone]   oxyBUTYnin ER 5 MG 24 hr tablet       Allergies   No Known Allergies

## 2025-07-03 NOTE — PLAN OF CARE
"Shift Summary 1900 - 0700  Pt is on comfort cares; on 3LPM NC as per baseline. Pt sleepy and drowsy at start of shift arousing to painful stimuli and has been more awake towards the AM. Q2 repos done;  c/o back pain - PRN Tylenol administered, takes pills crushed in apple sauce. For discharge today to [facility] by 11.30 am on Hospice care.    Problem: Adult Inpatient Plan of Care  Goal: Plan of Care Review  Description: The Plan of Care Review/Shift note should be completed every shift.  The Outcome Evaluation is a brief statement about your assessment that the patient is improving, declining, or no change.  This information will be displayed automatically on your shift  note.  Outcome: Adequate for Care Transition  Flowsheets (Taken 7/3/2025 0413)  Outcome Evaluation: Pt met very somnolent, arousing to ainful stimuli after having PO ativan previous shift. LOC improved towards early AM. 3LPM NC  Plan of Care Reviewed With: patient  Overall Patient Progress: no change  Goal: Patient-Specific Goal (Individualized)  Description: You can add care plan individualizations to a care plan. Examples of Individualization might be:  \"Parent requests to be called daily at 9am for status\", \"I have a hard time hearing out of my right ear\", or \"Do not touch me to wake me up as it startles  me\".  Outcome: Adequate for Care Transition  Goal: Absence of Hospital-Acquired Illness or Injury  Outcome: Adequate for Care Transition  Intervention: Identify and Manage Fall Risk  Recent Flowsheet Documentation  Taken 7/2/2025 2005 by Rosi Lindsey RN  Safety Promotion/Fall Prevention:   safety round/check completed   room near nurse's station   room door open   lighting adjusted   clutter free environment maintained   activity supervised  Intervention: Prevent Skin Injury  Recent Flowsheet Documentation  Taken 7/2/2025 2005 by Rosi Lindsey RN  Body Position:   turned   right  Skin Protection:   adhesive use limited   " incontinence pads utilized   tubing/devices free from skin contact  Intervention: Prevent Infection  Recent Flowsheet Documentation  Taken 7/2/2025 1945 by Rosi Lindsey, RN  Infection Prevention:   rest/sleep promoted   single patient room provided  Goal: Optimal Comfort and Wellbeing  Outcome: Adequate for Care Transition  Goal: Readiness for Transition of Care  Outcome: Adequate for Care Transition         Goal Outcome Evaluation:      Plan of Care Reviewed With: patient    Overall Patient Progress: no changeOverall Patient Progress: no change    Outcome Evaluation: Pt met very somnolent, arousing to ainful stimuli after having PO ativan previous shift. LOC improved towards early AM. 3LPM NC

## 2025-07-03 NOTE — PROGRESS NOTES
Care Management Discharge Note    Discharge Date: 07/03/2025       Discharge Disposition:  [facility]    Discharge Services:  [facility]    Discharge DME:      Discharge Transportation:      Private pay costs discussed: transportation costs    Does the patient's insurance plan have a 3 day qualifying hospital stay waiver?  No    PAS Confirmation Code:    Patient/family educated on Medicare website which has current facility and service quality ratings:      Education Provided on the Discharge Plan:  yes  Persons Notified of Discharge Plans: pt and pt's family  Patient/Family in Agreement with the Plan: yes    Handoff Referral Completed: No, handoff not indicated or clinically appropriate    Additional Information:  Pt is medically ready for discharge. Pt is discharging to [facility] with [facility]. [facility] wheelchair transport arranged for today between 10:30-11:30am.     Discharge orders sent to [facility] and [facility].     DARIO Patel, DAISY WARE Care Coordinator/ Med Surg [address] [place] [address]  [phone]

## 2025-07-03 NOTE — PLAN OF CARE
PT cleared for discharge by provider. Discharge packet and meds sent with transport. PT discharged to hospice facility via [facility] transport @4337.

## 2025-07-03 NOTE — PLAN OF CARE
Goal Outcome Evaluation:      Plan of Care Reviewed With: patient    Overall Patient Progress: improvingOverall Patient Progress: improving    Outcome Evaluation: Administered PO ativan at 1300. Anxiousness slowly improved into sleepiness this evening, aroused temporarily with rubbing. Repositioning. Herbert in for end of life, cares done. Noticed small amount of blood, possibly from repositioning - pt also has habit of shifting in bed. 3L NC. Too sleepy for PO intake this evening.      Problem: Adult Inpatient Plan of Care  Goal: Plan of Care Review  Description: The Plan of Care Review/Shift note should be completed every shift.  The Outcome Evaluation is a brief statement about your assessment that the patient is improving, declining, or no change.  This information will be displayed automatically on your shift  note.  7/2/2025 1946 by Howie Edwards, RN  Outcome: Progressing  Flowsheets (Taken 7/2/2025 1945)  Outcome Evaluation: Administered PO ativan at 1300. Anxiousness slowly improved into sleepiness this evening, aroused temporarily with rubbing. Repositioning. Herbert in for end of life, cares done. Noticed small amount of blood, possibly from repositioning - pt also has habit of shifting in bed. 3L NC. Too sleepy for PO intake this evening.  Plan of Care Reviewed With: patient  Overall Patient Progress: improving  7/2/2025 1609 by Howie Edwards, RN  Outcome: Progressing  Flowsheets (Taken 7/2/2025 1609)  Outcome Evaluation: Confused severity has shifted throughout day, sometimes only alert to self - very anxious and frustrated, other times alert and oriented - able to discuss and talk about disposition plans. Pt complains of back pain - she certainly has pretty pronounced kyphosis, but refused pain medications. In an anxious spell she was also refusing anxiety management - but I was ultimately able to convince her and gave the PRN PO ativan. 50% of diet intake at meals. Snacks between meals. Due to difficulty  "with lift - we used sera steady to get pt from chair to bed early afternoon and she did ok with this assist of 2. BL 3L O2. Pt has castro for end of life - cares done. Pt was complaining of urge to void, I had pt bladder scanned for 8ml, and oxybutinin added  - at time of administration patient sleeping - I decided to defer until later as pt finally comfortable. Plan is to discharge to hospice tomorrow - wheel chair ride [id number].  Plan of Care Reviewed With: patient  Overall Patient Progress: improving  Goal: Patient-Specific Goal (Individualized)  Description: You can add care plan individualizations to a care plan. Examples of Individualization might be:  \"Parent requests to be called daily at 9am for status\", \"I have a hard time hearing out of my right ear\", or \"Do not touch me to wake me up as it startles  me\".  7/2/2025 1946 by Howie Edwards RN  Outcome: Progressing  7/2/2025 1609 by Howie Edwards RN  Outcome: Progressing  Goal: Absence of Hospital-Acquired Illness or Injury  7/2/2025 1946 by Howie Edwards RN  Outcome: Progressing  7/2/2025 1609 by Howie Edwards RN  Outcome: Progressing  Intervention: Prevent Skin Injury  Recent Flowsheet Documentation  Taken 7/2/2025 1800 by Howie Edwards RN  Body Position:   turned   left  Goal: Optimal Comfort and Wellbeing  7/2/2025 1946 by Howie Edwards RN  Outcome: Progressing  7/2/2025 1609 by Howie Edwards RN  Outcome: Progressing  Intervention: Provide Person-Centered Care  Recent Flowsheet Documentation  Taken 7/2/2025 0800 by Howie Edwards RN  Trust Relationship/Rapport:   choices provided   questions answered  Goal: Readiness for Transition of Care  7/2/2025 1946 by Howie Edwards RN  Outcome: Progressing  7/2/2025 1609 by Howie Edwards RN  Outcome: Progressing         "